# Patient Record
Sex: FEMALE | Race: WHITE | NOT HISPANIC OR LATINO | Employment: OTHER | URBAN - METROPOLITAN AREA
[De-identification: names, ages, dates, MRNs, and addresses within clinical notes are randomized per-mention and may not be internally consistent; named-entity substitution may affect disease eponyms.]

---

## 2017-03-20 ENCOUNTER — TELEPHONE (OUTPATIENT)
Dept: ENDOCRINOLOGY | Facility: CLINIC | Age: 57
End: 2017-03-20

## 2017-03-20 NOTE — TELEPHONE ENCOUNTER
----- Message from Julieta Haile sent at 3/20/2017  8:13 AM CDT -----  Contact: Self [phone]  Pt needs to schedule a f/u with bill. pls add orders if needed.

## 2017-03-27 ENCOUNTER — CLINICAL SUPPORT (OUTPATIENT)
Dept: INFECTIOUS DISEASES | Facility: CLINIC | Age: 57
End: 2017-03-27
Payer: COMMERCIAL

## 2017-03-27 DIAGNOSIS — Z71.84 TRAVEL ADVICE ENCOUNTER: ICD-10-CM

## 2017-03-27 DIAGNOSIS — Z23 IMMUNIZATION DUE: ICD-10-CM

## 2017-03-27 PROCEDURE — 90471 IMMUNIZATION ADMIN: CPT | Mod: S$GLB,,, | Performed by: INTERNAL MEDICINE

## 2017-03-27 PROCEDURE — 99999 PR PBB SHADOW E&M-EST. PATIENT-LVL I: CPT | Mod: PBBFAC,,,

## 2017-03-27 PROCEDURE — 90632 HEPA VACCINE ADULT IM: CPT | Mod: S$GLB,,, | Performed by: INTERNAL MEDICINE

## 2017-04-03 ENCOUNTER — OFFICE VISIT (OUTPATIENT)
Dept: ENDOCRINOLOGY | Facility: CLINIC | Age: 57
End: 2017-04-03
Payer: COMMERCIAL

## 2017-04-03 ENCOUNTER — HOSPITAL ENCOUNTER (OUTPATIENT)
Dept: RADIOLOGY | Facility: CLINIC | Age: 57
Discharge: HOME | End: 2017-04-03
Attending: INTERNAL MEDICINE
Payer: COMMERCIAL

## 2017-04-03 ENCOUNTER — TELEPHONE (OUTPATIENT)
Dept: ENDOCRINOLOGY | Facility: CLINIC | Age: 57
End: 2017-04-03

## 2017-04-03 VITALS
HEIGHT: 68 IN | DIASTOLIC BLOOD PRESSURE: 68 MMHG | BODY MASS INDEX: 19.45 KG/M2 | SYSTOLIC BLOOD PRESSURE: 115 MMHG | WEIGHT: 128.31 LBS | HEART RATE: 63 BPM | RESPIRATION RATE: 16 BRPM

## 2017-04-03 DIAGNOSIS — E03.8 HYPOTHYROIDISM DUE TO HASHIMOTO'S THYROIDITIS: Primary | ICD-10-CM

## 2017-04-03 DIAGNOSIS — E06.3 HASHIMOTO'S THYROIDITIS: ICD-10-CM

## 2017-04-03 DIAGNOSIS — E06.3 HYPOTHYROIDISM DUE TO HASHIMOTO'S THYROIDITIS: Primary | ICD-10-CM

## 2017-04-03 DIAGNOSIS — M81.0 OSTEOPOROSIS: ICD-10-CM

## 2017-04-03 DIAGNOSIS — M81.0 OSTEOPOROSIS, UNSPECIFIED OSTEOPOROSIS TYPE, UNSPECIFIED PATHOLOGICAL FRACTURE PRESENCE: ICD-10-CM

## 2017-04-03 PROCEDURE — 99213 OFFICE O/P EST LOW 20 MIN: CPT | Mod: S$GLB,,, | Performed by: INTERNAL MEDICINE

## 2017-04-03 PROCEDURE — 1160F RVW MEDS BY RX/DR IN RCRD: CPT | Mod: S$GLB,,, | Performed by: INTERNAL MEDICINE

## 2017-04-03 PROCEDURE — 77080 DXA BONE DENSITY AXIAL: CPT | Mod: TC

## 2017-04-03 PROCEDURE — 99999 PR PBB SHADOW E&M-EST. PATIENT-LVL III: CPT | Mod: PBBFAC,,, | Performed by: INTERNAL MEDICINE

## 2017-04-03 PROCEDURE — 77080 DXA BONE DENSITY AXIAL: CPT | Mod: 26,,, | Performed by: INTERNAL MEDICINE

## 2017-04-03 NOTE — PROGRESS NOTES
Subjective:      Patient ID: uNris Cm is a 56 y.o. female.    Chief Complaint:  Hashimoto's Thyroiditis      History of Present Illness  Ms. Cm presents for follow up of Hashimoto's thyroid disease and osteoporosis.   Last seen by Dr. Hicks in 3/2015 and in 11/2016 by Dr. Devine     This is the patient's initial visit with me today     Has active history of autoimmune hepatitis treated with Imuran, breast CA (BRCA 1 positive) in remission and likely Celiacs disease on gluten free diet.      Thyroid levels last checked in 11/2016 and TSH found to be suppressed. [place] Thyroid dose was decreased to 75 mg daily ( 15 mg +60 mg daily)     Repeat TSH drawn 12/23/2016 - now within normal limits     She endorses good compliance with taking [place] thyroid on a daily basis. She denies missing or skipping doses     She also reports fatigue, cold intolerance, and hair loss   She denies occasional tremors or palpitations       Has osteoporosis. Last bone density confirmed osteoporosis. Was previously on monthly Boniva. Was taking Boniva with other meds and after meals. Was told to stop Boniva as it wasn't working. Tolerated the Boniva without issue. On calcium and vit D.   Changed to Fosamax once weekly in 11/2016 at last office visit   Tolerating well without known complications   Takes appropriately with water and sits upright for at least an hour after taking Fosamax     She denies recent falls or fractures   Had repeat BMD today - results pending     Last BMD dated 4/13/2015:  Lumbar Spine: Lumbar bone mineral density L1-L4 is 0.904g/cm2, which is a t-score of -1.3. The z-score is -0.1.    Total Hip: The total hip bone mineral density is 0.680g/cm2.  The t-score is -2.1, and the z-score is -1.4.  Femoral neck BMD is 0.553g/cm2 and the t-score is -2.7.    COMPARISONS: No Previous studies available.   Impression     Osteoporosis of the femoral neck.        Review of Systems   Constitutional: Positive for fatigue.  Negative for chills, fever and unexpected weight change.   Respiratory: Negative for cough and shortness of breath.    Gastrointestinal: Positive for abdominal pain (occasional, depending on diet ). Negative for constipation, nausea and vomiting.   Endocrine: Positive for cold intolerance. Negative for heat intolerance, polydipsia, polyphagia and polyuria.   Musculoskeletal: Negative for neck pain.   Neurological: Negative for dizziness and headaches.   Psychiatric/Behavioral: Negative for confusion. The patient is not nervous/anxious.    As above    Objective:   Physical Exam   Constitutional: She is oriented to person, place, and time. She appears well-developed. No distress.   Neck: No thyromegaly present.   Cardiovascular: Normal rate and regular rhythm.    Pulmonary/Chest: Effort normal.   Abdominal: Soft.   Musculoskeletal: She exhibits no edema.   Neurological: She is alert and oriented to person, place, and time.   Skin: Skin is warm.   Psychiatric: She has a normal mood and affect. Her behavior is normal.   Nursing note and vitals reviewed.    Lab Review:   Results for LEDY GAN (MRN [id number]) as of 4/3/2017 12:49   Ref. Range 8/9/2016 09:15 11/10/2016 09:55 12/23/2016 11:33   TSH Latest Ref Range: 0.400 - 4.000 uIU/mL 7.538 (H) 0.209 (L) 1.425   Free T4 Latest Ref Range: 0.71 - 1.51 ng/dL 0.71 1.05      Results for LEDY GAN (MRN [id number]) as of 4/3/2017 12:49   Ref. Range 5/3/2016 10:46   Vit D, 25-Hydroxy Latest Ref Range: 30 - 96 ng/mL 52     Assessment:     1. Hypothyroidism due to Hashimoto's thyroiditis    2. Osteoporosis, unspecified osteoporosis type, unspecified pathological fracture presence        Plan:     1.) Patient with Hashimoto's thyroiditis  -- clinically and biochemically euthyroid   -- continue [place] thyroid  75 mg daily  -- avoid exogenous hyperthyroidism as this can accelerate bone loss and increase risk of CV complications     2.) Patient with osteoporosis  -- continue  Alendronate 70 mg once weekly   -- reviewed how to take bisphosphonates 30 min before food or other meds will full glass of water and remain upright for at least 30 min afterwards  -- emphasized the importance of calcium and vitamin D, calcium from food sources are preferred   -- continue weight bearing exercises as tolerated   -- fall safety and fall precautions   -- will notify patient with results BMD     RTC in 6 months for follow up with labs prior

## 2017-04-03 NOTE — MR AVS SNAPSHOT
[facility]  [address]  [facility] [address]  Phone: [phone]  Fax: [phone]                  Nuris Cm   4/3/2017 10:30 AM   Office Visit    Description:  Female : 1960   Provider:  Melissa Chavez NP   Department:  [facility] - Endo/Diab/Metab           Reason for Visit     Hashimoto's Thyroiditis           Diagnoses this Visit        Comments    Hypothyroidism due to Hashimoto's thyroiditis    -  Primary     Osteoporosis, unspecified osteoporosis type, unspecified pathological fracture presence                To Do List           Goals (5 Years of Data)     None      Follow-Up and Disposition     Return in about 6 months (around 10/3/2017).      [facility] On [facility]     [facility] On Call Nurse Care Line -  Assistance  Unless otherwise directed by your provider, please contact [facility] On-Call, our nurse care line that is available for  assistance.     Registered nurses in the [facility] On Call Center provide: appointment scheduling, clinical advisement, health education, and other advisory services.  Call: [phone] (toll free)               Medications           Message regarding Medications     Verify the changes and/or additions to your medication regime listed below are the same as discussed with your clinician today.  If any of these changes or additions are incorrect, please notify your healthcare provider.             Verify that the below list of medications is an accurate representation of the medications you are currently taking.  If none reported, the list may be blank. If incorrect, please contact your healthcare provider. Carry this list with you in case of emergency.           Current Medications     alendronate (FOSAMAX) 70 MG tablet Take 1 tablet (70 mg total) by mouth every 7 days.    azathioprine (IMURAN) 50 mg Tab Take 25 mg by mouth once daily.    azithromycin (ZITHROMAX) 500 MG tablet Take 2 tablets once if needed for severe  "diarrhea.    OSPHENA 60 mg Tab     RESTASIS 0.05 % ophthalmic emulsion Inject 1 drop into the eye once daily.    sumatriptan (IMITREX) 25 MG Tab Take 50 mg by mouth as needed.    thyroid, pork, (ARMOUR THYROID) 15 mg Tab Take 1 tablet by mouth once daily.    thyroid, pork, (ARMOUR THYROID) 60 mg Tab Take 1 tablet (60 mg total) by mouth once daily.    tizanidine (ZANAFLEX) 4 MG tablet Take 4 mg by mouth nightly.    tramadol-acetaminophen 37.5-325 mg (ULTRACET) 37.5-325 mg Tab Take 1 tablet by mouth as needed.    trazodone (DESYREL) 50 MG tablet Take 1 tablet by mouth every evening.    valacyclovir (VALTREX) 500 MG tablet Take 1 tablet (500 mg total) by mouth once daily.           Clinical Reference Information           Your Vitals Were     BP Pulse Resp Height Weight BMI    115/68 (BP Location: Left arm, Patient Position: Sitting, BP Method: Manual) 63 16 5' 8" (1.727 m) 58.2 kg (128 lb 4.9 oz) 19.51 kg/m2      Blood Pressure          Most Recent Value    BP  115/68      Allergies as of 4/3/2017     Codeine    Gluten      Immunizations Administered on Date of Encounter - 4/3/2017     None      Orders Placed During Today's Visit     Future Labs/Procedures Expected by Expires    Comprehensive metabolic panel  4/3/2017 (Approximate) 3/29/2018    TSH  4/3/2017 (Approximate) 3/29/2018    Vitamin D  4/3/2017 6/2/2018      Language Assistance Services     ATTENTION: Language assistance services are available, free of charge. Please call [phone].      ATENCIÓN: Si habla español, tiene a garnica disposición servicios gratuitos de asistencia lingüística. Llame al [phone].     CHÚ Ý: N?u b?n nói Ti?ng Vi?t, có các d?ch v? h? tr? ngôn ng? mi?n phí dành cho b?n. G?i s? [phone].         [facility] complies with applicable Federal civil rights laws and does not discriminate on the basis of race, color, national origin, age, disability, or sex.        "

## 2017-04-03 NOTE — TELEPHONE ENCOUNTER
----- Message from Jen Garcia sent at 4/3/2017  8:45 AM CDT -----  Contact: starr      [phone]   Pt.is asking for a f/u and to speak to the nurse about trying to get in to see Dr. Devine for a f/u.

## 2017-04-05 ENCOUNTER — PATIENT MESSAGE (OUTPATIENT)
Dept: ENDOCRINOLOGY | Facility: CLINIC | Age: 57
End: 2017-04-05

## 2017-04-18 RX ORDER — THYROID 15 MG/1
15 TABLET ORAL DAILY
Qty: 90 TABLET | Refills: 3 | Status: SHIPPED | OUTPATIENT
Start: 2017-04-18 | End: 2018-06-04 | Stop reason: SDUPTHER

## 2017-04-18 RX ORDER — THYROID 60 MG/1
60 TABLET ORAL DAILY
Qty: 90 TABLET | Refills: 3 | Status: SHIPPED | OUTPATIENT
Start: 2017-04-18 | End: 2018-06-04 | Stop reason: SDUPTHER

## 2017-11-22 ENCOUNTER — TELEPHONE (OUTPATIENT)
Dept: PULMONOLOGY | Facility: CLINIC | Age: 57
End: 2017-11-22

## 2017-11-22 DIAGNOSIS — K75.4 HEPATITIS, AUTOIMMUNE: Primary | ICD-10-CM

## 2017-11-22 NOTE — TELEPHONE ENCOUNTER
----- Message from Inez Gilligan sent at 11/22/2017 10:13 AM CST -----  Contact: [phone]  Pt call wantiing to get labs for Blood work and Lliver Scan Friday 11/24.

## 2017-11-24 ENCOUNTER — HOSPITAL ENCOUNTER (OUTPATIENT)
Dept: RADIOLOGY | Facility: HOSPITAL | Age: 57
Discharge: HOME | End: 2017-11-24
Attending: INTERNAL MEDICINE
Payer: COMMERCIAL

## 2017-11-24 DIAGNOSIS — K75.4 HEPATITIS, AUTOIMMUNE: ICD-10-CM

## 2017-11-24 PROCEDURE — 76705 ECHO EXAM OF ABDOMEN: CPT | Mod: TC

## 2017-11-24 PROCEDURE — 76705 ECHO EXAM OF ABDOMEN: CPT | Mod: 26,,, | Performed by: RADIOLOGY

## 2017-11-29 DIAGNOSIS — K80.10 CALCULUS OF GALLBLADDER WITH CHRONIC CHOLECYSTITIS WITHOUT OBSTRUCTION: Primary | ICD-10-CM

## 2017-11-30 ENCOUNTER — TELEPHONE (OUTPATIENT)
Dept: PULMONOLOGY | Facility: CLINIC | Age: 57
End: 2017-11-30

## 2017-11-30 NOTE — TELEPHONE ENCOUNTER
I mistakenly booked an appointment with Dr Deleon in Surgery clinic for Mrs Nuris Cm for 12-7-17. Dr Kiser called this to my attention and I cancelled it and called Mrs Cm to apologize  for the error and confusion that it caused. Mrs Cm does not have gallstones, the appoinment is needed for a different patient. Zoila Kim LPN.

## 2017-11-30 NOTE — TELEPHONE ENCOUNTER
----- Message from Jen Garcia sent at 11/30/2017 10:50 AM CST -----  Contact: Nuris  tel: [phone]  Returning a call to Zoila.   Has some questions.   Pls call.

## 2017-12-08 ENCOUNTER — OFFICE VISIT (OUTPATIENT)
Dept: ENDOCRINOLOGY | Facility: CLINIC | Age: 57
End: 2017-12-08
Payer: COMMERCIAL

## 2017-12-08 VITALS
DIASTOLIC BLOOD PRESSURE: 68 MMHG | BODY MASS INDEX: 19.32 KG/M2 | HEIGHT: 68 IN | SYSTOLIC BLOOD PRESSURE: 100 MMHG | HEART RATE: 86 BPM | WEIGHT: 127.44 LBS

## 2017-12-08 DIAGNOSIS — E03.8 HYPOTHYROIDISM DUE TO HASHIMOTO'S THYROIDITIS: Primary | ICD-10-CM

## 2017-12-08 DIAGNOSIS — K75.4 AUTOIMMUNE HEPATITIS: ICD-10-CM

## 2017-12-08 DIAGNOSIS — M81.0 OSTEOPOROSIS, UNSPECIFIED OSTEOPOROSIS TYPE, UNSPECIFIED PATHOLOGICAL FRACTURE PRESENCE: ICD-10-CM

## 2017-12-08 DIAGNOSIS — E06.3 HYPOTHYROIDISM DUE TO HASHIMOTO'S THYROIDITIS: Primary | ICD-10-CM

## 2017-12-08 PROCEDURE — 99214 OFFICE O/P EST MOD 30 MIN: CPT | Mod: S$GLB,,, | Performed by: INTERNAL MEDICINE

## 2017-12-08 PROCEDURE — 99999 PR PBB SHADOW E&M-EST. PATIENT-LVL III: CPT | Mod: PBBFAC,,, | Performed by: INTERNAL MEDICINE

## 2017-12-08 RX ORDER — ALENDRONATE SODIUM 70 MG/1
70 TABLET ORAL
Qty: 12 TABLET | Refills: 3 | Status: SHIPPED | OUTPATIENT
Start: 2017-12-08 | End: 2017-12-14

## 2017-12-08 RX ORDER — VENLAFAXINE 75 MG/1
75 TABLET ORAL DAILY
COMMUNITY

## 2017-12-08 NOTE — PROGRESS NOTES
Patient ID: Nuris Cm is a 57 y.o. female.    Chief Complaint:  Hypothyroidism and Osteoporosis      History of Present Illness  Ms. Cm presents for follow up of Hashimoto's thyroid disease and osteoporosis.   Last seen by me in 11/2016.     Has active history of autoimmune hepatitis treated with Imuran, breast CA (BRCA 1 positive) in remission and likely Celiacs disease on gluten free diet.      Thyroid levels last checked in 12/2016 and TSH found to be elevated. [place] thyroid dose at 75 mg daily. Last TSH WNL in 12/2016.    Has overt fatigue, cold intolerance. No heart palpitations. Has occ tremors. BM's have been regular.      Has osteoporosis. Last bone density confirmed osteoporosis. Has never had a fragility fracture.      Bone Density 4/2017:  BONE MINERAL DENSITY RESULTS:  Lumbar Spine: Lumbar bone mineral density L1-L4 is 0.906g/cm2, which is a t-score of -1.3. The z-score is 0.0.    Total Hip: The total hip bone mineral density is 0.768g/cm2.  The t-score is -1.4, and the z-score is -0.6.  Femoral neck BMD is 0.538g/cm2 and the t-score is -2.8.    COMPARISONS:  Date Location BMD T-score  04/13/15 L-spine 0.904 -1.3  Total Hip 0.680 -2.1   Impression       Osteoporosis of the femoral neck. There is a significant increase in BMD a the total hip (12.9%) when compared to previous study done in 2015. No change at the lumbar spine.        Review of Systems   Constitutional: Negative for chills and fever.   Gastrointestinal: Negative for nausea.   No CP  No SOB    Objective:   Physical Exam   Nursing note and vitals reviewed.  No thyromegaly   DTR's 2 +  No tremor  Lungs CTA b/l  Heart RRR    Lab Review:   Results for NURIS CM (MRN [id number]) as of 12/8/2017 07:26   Ref. Range 12/23/2016 11:33 11/24/2017 08:02   Sodium Latest Ref Range: 136 - 145 mmol/L  140   Potassium Latest Ref Range: 3.5 - 5.1 mmol/L  3.4 (L)   Chloride Latest Ref Range: 95 - 110 mmol/L  103   CO2 Latest Ref Range: 23 - 29 mmol/L   30 (H)   Anion Gap Latest Ref Range: 8 - 16 mmol/L  7 (L)   BUN, Bld Latest Ref Range: 6 - 20 mg/dL  12   Creatinine Latest Ref Range: 0.5 - 1.4 mg/dL  0.7   eGFR if non African American Latest Ref Range: >60 mL/min/1.73 m^2  >60.0   eGFR if African American Latest Ref Range: >60 mL/min/1.73 m^2  >60.0   Glucose Latest Ref Range: 70 - 110 mg/dL  79   Calcium Latest Ref Range: 8.7 - 10.5 mg/dL  9.4   Alkaline Phosphatase Latest Ref Range: 55 - 135 U/L  58   Total Protein Latest Ref Range: 6.0 - 8.4 g/dL  6.7   Albumin Latest Ref Range: 3.5 - 5.2 g/dL  3.7   Total Bilirubin Latest Ref Range: 0.1 - 1.0 mg/dL  0.8   AST Latest Ref Range: 10 - 40 U/L  21   ALT Latest Ref Range: 10 - 44 U/L  9 (L)   GGT Latest Ref Range: 8 - 55 U/L  9   Triglycerides Latest Ref Range: 30 - 150 mg/dL  62   Cholesterol Latest Ref Range: 120 - 199 mg/dL  189   HDL Latest Ref Range: 40 - 75 mg/dL  58   LDL Cholesterol Latest Ref Range: 63.0 - 159.0 mg/dL  118.6   Total Cholesterol/HDL Ratio Latest Ref Range: 2.0 - 5.0   3.3   TSH Latest Ref Range: 0.400 - 4.000 uIU/mL 1.425        Assessment:     1. Hypothyroidism due to Hashimoto's thyroiditis    2. Osteoporosis, unspecified osteoporosis type, unspecified pathological fracture presence    3. Autoimmune hepatitis      Plan:     1.) Patient with Hashimoto's thyroiditis  --Last TSH WNL  --Will repeat TSH today  --Will continue [place] thyroid to 75 mg daily pending TSH result     2.) Patient with osteoporosis  --Bone density in 4/2017 showed improvement at hip and stability at the spine  --Will continue alendronate 70 mg weekly   --Calcium and Vit D supplementation  --Weight bearing exercise  --Repeat bone density in 4/2019    3.) On Imuran     RTC in 6 months with labs prior to appt     Artemio Devine M.D. Staff Endocrinology

## 2017-12-10 ENCOUNTER — PATIENT MESSAGE (OUTPATIENT)
Dept: ENDOCRINOLOGY | Facility: CLINIC | Age: 57
End: 2017-12-10

## 2017-12-14 RX ORDER — ALENDRONATE SODIUM 70 MG/1
TABLET ORAL
Qty: 12 TABLET | Refills: 3 | Status: SHIPPED | OUTPATIENT
Start: 2017-12-14 | End: 2018-09-20 | Stop reason: SDUPTHER

## 2018-02-02 DIAGNOSIS — Z00.00 ROUTINE GENERAL MEDICAL EXAMINATION AT A HEALTH CARE FACILITY: Primary | ICD-10-CM

## 2018-02-06 ENCOUNTER — OFFICE VISIT (OUTPATIENT)
Dept: PULMONOLOGY | Facility: CLINIC | Age: 58
End: 2018-02-06
Payer: COMMERCIAL

## 2018-02-06 ENCOUNTER — HOSPITAL ENCOUNTER (OUTPATIENT)
Dept: CARDIOLOGY | Facility: CLINIC | Age: 58
Discharge: HOME | End: 2018-02-06
Payer: COMMERCIAL

## 2018-02-06 ENCOUNTER — CLINICAL SUPPORT (OUTPATIENT)
Dept: INTERNAL MEDICINE | Facility: CLINIC | Age: 58
End: 2018-02-06
Payer: COMMERCIAL

## 2018-02-06 VITALS
HEIGHT: 68 IN | HEART RATE: 75 BPM | SYSTOLIC BLOOD PRESSURE: 109 MMHG | WEIGHT: 123 LBS | BODY MASS INDEX: 18.64 KG/M2 | DIASTOLIC BLOOD PRESSURE: 70 MMHG

## 2018-02-06 DIAGNOSIS — Z00.00 ROUTINE GENERAL MEDICAL EXAMINATION AT A HEALTH CARE FACILITY: Primary | ICD-10-CM

## 2018-02-06 DIAGNOSIS — Z00.00 ANNUAL PHYSICAL EXAM: Primary | ICD-10-CM

## 2018-02-06 DIAGNOSIS — Z00.00 ROUTINE GENERAL MEDICAL EXAMINATION AT A HEALTH CARE FACILITY: ICD-10-CM

## 2018-02-06 LAB
ALBUMIN SERPL BCP-MCNC: 3.9 G/DL
ALP SERPL-CCNC: 70 U/L
ALT SERPL W/O P-5'-P-CCNC: 11 U/L
ANION GAP SERPL CALC-SCNC: 8 MMOL/L
AST SERPL-CCNC: 22 U/L
BILIRUB SERPL-MCNC: 1 MG/DL
BUN SERPL-MCNC: 11 MG/DL
CALCIUM SERPL-MCNC: 9.9 MG/DL
CHLORIDE SERPL-SCNC: 107 MMOL/L
CHOLEST SERPL-MCNC: 194 MG/DL
CHOLEST/HDLC SERPL: 3 {RATIO}
CO2 SERPL-SCNC: 30 MMOL/L
CREAT SERPL-MCNC: 0.8 MG/DL
ERYTHROCYTE [DISTWIDTH] IN BLOOD BY AUTOMATED COUNT: 11.9 %
EST. GFR  (AFRICAN AMERICAN): >60 ML/MIN/1.73 M^2
EST. GFR  (NON AFRICAN AMERICAN): >60 ML/MIN/1.73 M^2
ESTIMATED AVG GLUCOSE: 97 MG/DL
GLUCOSE SERPL-MCNC: 83 MG/DL
HBA1C MFR BLD HPLC: 5 %
HCT VFR BLD AUTO: 38 %
HDLC SERPL-MCNC: 64 MG/DL
HDLC SERPL: 33 %
HGB BLD-MCNC: 13 G/DL
LDLC SERPL CALC-MCNC: 118.8 MG/DL
MCH RBC QN AUTO: 34.2 PG
MCHC RBC AUTO-ENTMCNC: 34.2 G/DL
MCV RBC AUTO: 100 FL
NONHDLC SERPL-MCNC: 130 MG/DL
PLATELET # BLD AUTO: 168 K/UL
PMV BLD AUTO: 9.7 FL
POTASSIUM SERPL-SCNC: 3.7 MMOL/L
PROT SERPL-MCNC: 7.1 G/DL
RBC # BLD AUTO: 3.8 M/UL
SODIUM SERPL-SCNC: 145 MMOL/L
T4 FREE SERPL-MCNC: 0.84 NG/DL
TRIGL SERPL-MCNC: 56 MG/DL
TSH SERPL DL<=0.005 MIU/L-ACNC: 0.11 UIU/ML
WBC # BLD AUTO: 2.77 K/UL

## 2018-02-06 PROCEDURE — 85027 COMPLETE CBC AUTOMATED: CPT

## 2018-02-06 PROCEDURE — 84439 ASSAY OF FREE THYROXINE: CPT

## 2018-02-06 PROCEDURE — 83036 HEMOGLOBIN GLYCOSYLATED A1C: CPT

## 2018-02-06 PROCEDURE — 80053 COMPREHEN METABOLIC PANEL: CPT

## 2018-02-06 PROCEDURE — 84443 ASSAY THYROID STIM HORMONE: CPT

## 2018-02-06 PROCEDURE — 93000 ELECTROCARDIOGRAM COMPLETE: CPT | Mod: S$GLB,,, | Performed by: INTERNAL MEDICINE

## 2018-02-06 PROCEDURE — 99396 PREV VISIT EST AGE 40-64: CPT | Mod: S$GLB,,, | Performed by: INTERNAL MEDICINE

## 2018-02-06 PROCEDURE — 99999 PR PBB SHADOW E&M-EST. PATIENT-LVL III: CPT | Mod: PBBFAC,,, | Performed by: INTERNAL MEDICINE

## 2018-02-06 PROCEDURE — 80061 LIPID PANEL: CPT

## 2018-02-06 RX ORDER — MONTELUKAST SODIUM 10 MG/1
1 TABLET ORAL DAILY PRN
Refills: 0 | COMMUNITY
Start: 2018-02-01 | End: 2019-02-13

## 2018-02-06 RX ORDER — MELOXICAM 15 MG/1
1 TABLET ORAL DAILY
Refills: 3 | COMMUNITY
Start: 2018-02-01

## 2018-02-06 RX ORDER — DIPHENOXYLATE HYDROCHLORIDE AND ATROPINE SULFATE 2.5; .025 MG/1; MG/1
1 TABLET ORAL 3 TIMES DAILY PRN
Refills: 0 | COMMUNITY
Start: 2017-12-29 | End: 2019-02-13 | Stop reason: SDUPTHER

## 2018-02-06 NOTE — PROGRESS NOTES
Subjective:       Patient ID: Nuris Cm is a 57 y.o. female.    Chief Complaint: Annual Exam    HPI  58 yo female comes for her periodic health exam. She feels well, recently had her annual check with Dr. Billy in [place] for her auto immune hepatitis, is on 25 mg of Imuran for maintenance. 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 She has done very well, her oncologist at [facility] has dismissed her, I will see if I can get Dr. Carrillo to follow her.  Review of Systems   Constitutional: Negative.    HENT: Negative.    Eyes: Negative.    Respiratory: Negative.    Cardiovascular: Negative.    Gastrointestinal: Negative.         Long term maintenance with Imuran 25 mg for auto immune heptatitis   Endocrine:        Thyroid replacement following Hashimoto' sThyroiditis    Fosamax for osteoporosis   Genitourinary: Negative.    Musculoskeletal: Negative.    Skin: Negative.    Neurological: Negative.    Psychiatric/Behavioral: Negative.    All other systems reviewed and are negative.      Objective:      Physical Exam   Constitutional: She is oriented to person, place, and time. She appears well-developed and well-nourished. No distress.   HENT:   Head: Normocephalic and atraumatic.   Right Ear: External ear normal.   Left Ear: External ear normal.   Nose: Nose normal.   Mouth/Throat: Oropharynx is clear and moist.   Eyes: Conjunctivae and EOM are normal. Pupils are equal, round, and reactive to light.   Neck: Normal range of motion. Neck supple. No JVD present. No thyromegaly present.   Cardiovascular: Normal rate, regular rhythm, normal heart sounds and intact distal pulses.  Exam reveals no gallop.    No murmur heard.  Pulmonary/Chest: Breath sounds normal. No stridor. No respiratory distress. She has no wheezes. She has no rales. She exhibits no tenderness.   Peak flow 440 l/min   Abdominal: Soft. Bowel sounds are  normal. She exhibits no distension and no mass. There is no tenderness. There is no rebound and no guarding.   Musculoskeletal: Normal range of motion. She exhibits no edema.   Lymphadenopathy:     She has no cervical adenopathy.   Neurological: She is alert and oriented to person, place, and time. She has normal reflexes. She displays normal reflexes. No cranial nerve deficit.   Skin: Skin is warm and dry. No rash noted.   Psychiatric: She has a normal mood and affect. Her behavior is normal. Judgment and thought content normal.   Nursing note and vitals reviewed.      Assessment:       No diagnosis found.    Plan:        Labs: Low TSH compatible with thyroid replacement with normal T-4, all other parameters are normal.EKG is normal. IMP: Healthy Female

## 2018-02-06 NOTE — LETTER
February 6, 2018    Nuris Cm  [address] [facility] [place] [address]             [facility]  [address]  [facility] [address]  Phone: [phone] Dear Nuris,       Thank you for allowing me to serve you and perform your [facility] exam on 2/6/2018. This letter will serve as a brief summary of the physical findings and laboratory/studies performed and recommendations at this time. Tell [place]  Congratulations on his upcoming nuptials.          If you have any questions or concerns, please don't hesitate to call.    Sincerely,        Perry Kiser MD

## 2018-06-04 RX ORDER — SULFAMETHOXAZOLE AND TRIMETHOPRIM 800; 160 MG/1; MG/1
TABLET ORAL
Qty: 90 TABLET | Refills: 1 | Status: SHIPPED | OUTPATIENT
Start: 2018-06-04 | End: 2019-01-18 | Stop reason: SDUPTHER

## 2018-06-04 RX ORDER — THYROID, PORCINE 15 MG/1
TABLET ORAL
Qty: 90 TABLET | Refills: 1 | Status: SHIPPED | OUTPATIENT
Start: 2018-06-04 | End: 2018-11-24 | Stop reason: SDUPTHER

## 2018-06-27 DIAGNOSIS — B00.9 HERPES: ICD-10-CM

## 2018-06-27 RX ORDER — VALACYCLOVIR HYDROCHLORIDE 500 MG/1
TABLET, FILM COATED ORAL
Qty: 90 TABLET | Refills: 0 | Status: SHIPPED | OUTPATIENT
Start: 2018-06-27 | End: 2018-10-09 | Stop reason: SDUPTHER

## 2018-08-13 ENCOUNTER — PATIENT MESSAGE (OUTPATIENT)
Dept: ENDOCRINOLOGY | Facility: CLINIC | Age: 58
End: 2018-08-13

## 2018-09-20 ENCOUNTER — OFFICE VISIT (OUTPATIENT)
Dept: ENDOCRINOLOGY | Facility: CLINIC | Age: 58
End: 2018-09-20
Payer: COMMERCIAL

## 2018-09-20 VITALS
RESPIRATION RATE: 18 BRPM | WEIGHT: 125.88 LBS | HEIGHT: 68 IN | SYSTOLIC BLOOD PRESSURE: 112 MMHG | HEART RATE: 76 BPM | DIASTOLIC BLOOD PRESSURE: 76 MMHG | BODY MASS INDEX: 19.08 KG/M2

## 2018-09-20 DIAGNOSIS — E03.8 HYPOTHYROIDISM DUE TO HASHIMOTO'S THYROIDITIS: Primary | ICD-10-CM

## 2018-09-20 DIAGNOSIS — M81.0 OSTEOPOROSIS, UNSPECIFIED OSTEOPOROSIS TYPE, UNSPECIFIED PATHOLOGICAL FRACTURE PRESENCE: ICD-10-CM

## 2018-09-20 DIAGNOSIS — E06.3 HYPOTHYROIDISM DUE TO HASHIMOTO'S THYROIDITIS: Primary | ICD-10-CM

## 2018-09-20 DIAGNOSIS — K75.4 AUTOIMMUNE HEPATITIS: ICD-10-CM

## 2018-09-20 PROCEDURE — 99213 OFFICE O/P EST LOW 20 MIN: CPT | Mod: S$GLB,,, | Performed by: INTERNAL MEDICINE

## 2018-09-20 PROCEDURE — 3008F BODY MASS INDEX DOCD: CPT | Mod: CPTII,S$GLB,, | Performed by: INTERNAL MEDICINE

## 2018-09-20 PROCEDURE — 99999 PR PBB SHADOW E&M-EST. PATIENT-LVL III: CPT | Mod: PBBFAC,,, | Performed by: INTERNAL MEDICINE

## 2018-09-20 RX ORDER — ALENDRONATE SODIUM 70 MG/1
70 TABLET ORAL
Qty: 12 TABLET | Refills: 3 | Status: SHIPPED | OUTPATIENT
Start: 2018-09-20 | End: 2019-05-28 | Stop reason: SDUPTHER

## 2018-09-20 NOTE — PROGRESS NOTES
Subjective:      Patient ID: Nuris Cm is a 57 y.o. female.    Chief Complaint:  Hashimoto's Thyroiditis      History of Present Illness  Ms. Cm presents for follow up of Hashimoto's thyroid disease and osteoporosis.   Last seen by me in 12/2017.     Has active history of autoimmune hepatitis treated with Imuran, breast CA (BRCA 1 positive) in remission and likely Celiacs disease on gluten free diet.      Last TSH level suppressed. [place] thyroid dose at 75 mg daily. Last TSH WNL in 12/2016.    No overt fatigue. Has chronic constipation. Has some cold intolerance. Has been losing more hair recently. Has very dry skin.   No heart palpitations, tremors or increased anxiousness.      Has osteoporosis. Last bone density confirmed osteoporosis. Has never had a fragility fracture.      Bone Density 4/2017:  BONE MINERAL DENSITY RESULTS:  Lumbar Spine: Lumbar bone mineral density L1-L4 is 0.906g/cm2, which is a t-score of -1.3. The z-score is 0.0.    Total Hip: The total hip bone mineral density is 0.768g/cm2.  The t-score is -1.4, and the z-score is -0.6.  Femoral neck BMD is 0.538g/cm2 and the t-score is -2.8.    COMPARISONS:  Date Location BMD T-score  04/13/15 L-spine 0.904 -1.3  Total Hip 0.680 -2.1   Impression        Osteoporosis of the femoral neck. There is a significant increase in BMD a the total hip (12.9%) when compared to previous study done in 2015. No change at the lumbar spine.      Tolerating Fosamax with no issues.     Review of Systems   Constitutional: Negative for chills and fever.   Gastrointestinal: Negative for nausea.   No CP  No SOB    Objective:   Physical Exam   Nursing note and vitals reviewed.  No thyromegaly  No tremor  DTR's 2 +  Lungs CTA b/l  No edema    Lab Review:   Results for NURIS CM (MRN [id number]) as of 9/20/2018 15:38   Ref. Range 2/6/2018 08:54   Sodium Latest Ref Range: 136 - 145 mmol/L 145   Potassium Latest Ref Range: 3.5 - 5.1 mmol/L 3.7   Chloride Latest Ref Range:  95 - 110 mmol/L 107   CO2 Latest Ref Range: 23 - 29 mmol/L 30 (H)   Anion Gap Latest Ref Range: 8 - 16 mmol/L 8   BUN, Bld Latest Ref Range: 6 - 20 mg/dL 11   Creatinine Latest Ref Range: 0.5 - 1.4 mg/dL 0.8   eGFR if non African American Latest Ref Range: >60 mL/min/1.73 m^2 >60.0   eGFR if African American Latest Ref Range: >60 mL/min/1.73 m^2 >60.0   Glucose Latest Ref Range: 70 - 110 mg/dL 83   Calcium Latest Ref Range: 8.7 - 10.5 mg/dL 9.9   Alkaline Phosphatase Latest Ref Range: 55 - 135 U/L 70   Total Protein Latest Ref Range: 6.0 - 8.4 g/dL 7.1   Albumin Latest Ref Range: 3.5 - 5.2 g/dL 3.9   Total Bilirubin Latest Ref Range: 0.1 - 1.0 mg/dL 1.0   AST Latest Ref Range: 10 - 40 U/L 22   ALT Latest Ref Range: 10 - 44 U/L 11   Triglycerides Latest Ref Range: 30 - 150 mg/dL 56   Cholesterol Latest Ref Range: 120 - 199 mg/dL 194   HDL Latest Ref Range: 40 - 75 mg/dL 64   LDL Cholesterol Latest Ref Range: 63.0 - 159.0 mg/dL 118.8   Total Cholesterol/HDL Ratio Latest Ref Range: 2.0 - 5.0  3.0   Hemoglobin A1C Latest Ref Range: 4.0 - 5.6 % 5.0   Estimated Avg Glucose Latest Ref Range: 68 - 131 mg/dL 97   TSH Latest Ref Range: 0.400 - 4.000 uIU/mL 0.109 (L)   Free T4 Latest Ref Range: 0.71 - 1.51 ng/dL 0.84       Assessment:     1. Hypothyroidism due to Hashimoto's thyroiditis    2. Osteoporosis, unspecified osteoporosis type, unspecified pathological fracture presence    3. Autoimmune hepatitis      Plan:     1.) Patient with hypothyroidism  --Clinically she is euthyroid to slightly hypothyroid but biochemically hyperthyroid based on last TSH  --Will repeat TFT's now and adjust dose if necessary    2.) Patient with osteoporosis  --No history of fracture  --Last bone density showed stability  --Due for repeat bone density in 4/2019  --Continue Fosamax 70 mg weekly    3.) Currently on Imuran  --Will repeat abdominal ultrasound now      RTC in one year    Artemio Devine M.D. Staff Endocrinology

## 2018-10-02 ENCOUNTER — LAB VISIT (OUTPATIENT)
Dept: LAB | Facility: HOSPITAL | Age: 58
End: 2018-10-02
Attending: INTERNAL MEDICINE
Payer: COMMERCIAL

## 2018-10-02 DIAGNOSIS — M81.0 OSTEOPOROSIS, UNSPECIFIED OSTEOPOROSIS TYPE, UNSPECIFIED PATHOLOGICAL FRACTURE PRESENCE: ICD-10-CM

## 2018-10-02 DIAGNOSIS — E06.3 HYPOTHYROIDISM DUE TO HASHIMOTO'S THYROIDITIS: ICD-10-CM

## 2018-10-02 DIAGNOSIS — E03.8 HYPOTHYROIDISM DUE TO HASHIMOTO'S THYROIDITIS: ICD-10-CM

## 2018-10-02 DIAGNOSIS — K75.4 AUTOIMMUNE HEPATITIS: ICD-10-CM

## 2018-10-02 LAB
25(OH)D3+25(OH)D2 SERPL-MCNC: 37 NG/ML
ALBUMIN SERPL BCP-MCNC: 4 G/DL
ALP SERPL-CCNC: 54 U/L
ALT SERPL W/O P-5'-P-CCNC: 11 U/L
ANION GAP SERPL CALC-SCNC: 8 MMOL/L
AST SERPL-CCNC: 23 U/L
BASOPHILS # BLD AUTO: 0.02 K/UL
BASOPHILS NFR BLD: 0.6 %
BILIRUB SERPL-MCNC: 0.6 MG/DL
BUN SERPL-MCNC: 13 MG/DL
CALCIUM SERPL-MCNC: 9.9 MG/DL
CHLORIDE SERPL-SCNC: 103 MMOL/L
CHOLEST SERPL-MCNC: 177 MG/DL
CHOLEST/HDLC SERPL: 2.9 {RATIO}
CO2 SERPL-SCNC: 25 MMOL/L
CREAT SERPL-MCNC: 0.7 MG/DL
DIFFERENTIAL METHOD: ABNORMAL
EOSINOPHIL # BLD AUTO: 0.1 K/UL
EOSINOPHIL NFR BLD: 2 %
ERYTHROCYTE [DISTWIDTH] IN BLOOD BY AUTOMATED COUNT: 12.2 %
EST. GFR  (AFRICAN AMERICAN): >60 ML/MIN/1.73 M^2
EST. GFR  (NON AFRICAN AMERICAN): >60 ML/MIN/1.73 M^2
GLUCOSE SERPL-MCNC: 81 MG/DL
HCT VFR BLD AUTO: 39 %
HDLC SERPL-MCNC: 62 MG/DL
HDLC SERPL: 35 %
HGB BLD-MCNC: 13 G/DL
IMM GRANULOCYTES # BLD AUTO: 0.01 K/UL
IMM GRANULOCYTES NFR BLD AUTO: 0.3 %
LDLC SERPL CALC-MCNC: 104.2 MG/DL
LYMPHOCYTES # BLD AUTO: 0.6 K/UL
LYMPHOCYTES NFR BLD: 15.9 %
MCH RBC QN AUTO: 35 PG
MCHC RBC AUTO-ENTMCNC: 33.3 G/DL
MCV RBC AUTO: 105 FL
MONOCYTES # BLD AUTO: 0.4 K/UL
MONOCYTES NFR BLD: 11.9 %
NEUTROPHILS # BLD AUTO: 2.4 K/UL
NEUTROPHILS NFR BLD: 69.3 %
NONHDLC SERPL-MCNC: 115 MG/DL
NRBC BLD-RTO: 0 /100 WBC
PLATELET # BLD AUTO: 160 K/UL
PMV BLD AUTO: 10.4 FL
POTASSIUM SERPL-SCNC: 4 MMOL/L
PROT SERPL-MCNC: 6.7 G/DL
RBC # BLD AUTO: 3.71 M/UL
SODIUM SERPL-SCNC: 136 MMOL/L
T3 SERPL-MCNC: 93 NG/DL
T4 FREE SERPL-MCNC: 0.86 NG/DL
TRIGL SERPL-MCNC: 54 MG/DL
TSH SERPL DL<=0.005 MIU/L-ACNC: 0.08 UIU/ML
WBC # BLD AUTO: 3.45 K/UL

## 2018-10-02 PROCEDURE — 84439 ASSAY OF FREE THYROXINE: CPT

## 2018-10-02 PROCEDURE — 84443 ASSAY THYROID STIM HORMONE: CPT

## 2018-10-02 PROCEDURE — 82306 VITAMIN D 25 HYDROXY: CPT

## 2018-10-02 PROCEDURE — 84480 ASSAY TRIIODOTHYRONINE (T3): CPT

## 2018-10-02 PROCEDURE — 36415 COLL VENOUS BLD VENIPUNCTURE: CPT | Mod: PO

## 2018-10-02 PROCEDURE — 85025 COMPLETE CBC W/AUTO DIFF WBC: CPT

## 2018-10-02 PROCEDURE — 80053 COMPREHEN METABOLIC PANEL: CPT

## 2018-10-02 PROCEDURE — 80061 LIPID PANEL: CPT

## 2018-10-03 ENCOUNTER — HOSPITAL ENCOUNTER (OUTPATIENT)
Dept: RADIOLOGY | Facility: HOSPITAL | Age: 58
Discharge: HOME | End: 2018-10-03
Attending: INTERNAL MEDICINE
Payer: COMMERCIAL

## 2018-10-03 DIAGNOSIS — K75.4 AUTOIMMUNE HEPATITIS: ICD-10-CM

## 2018-10-03 PROCEDURE — 76705 ECHO EXAM OF ABDOMEN: CPT | Mod: 26,,, | Performed by: RADIOLOGY

## 2018-10-03 PROCEDURE — 76705 ECHO EXAM OF ABDOMEN: CPT | Mod: TC

## 2018-10-06 ENCOUNTER — PATIENT MESSAGE (OUTPATIENT)
Dept: ENDOCRINOLOGY | Facility: CLINIC | Age: 58
End: 2018-10-06

## 2018-10-06 DIAGNOSIS — E03.8 HYPOTHYROIDISM DUE TO HASHIMOTO'S THYROIDITIS: Primary | ICD-10-CM

## 2018-10-06 DIAGNOSIS — E06.3 HYPOTHYROIDISM DUE TO HASHIMOTO'S THYROIDITIS: Primary | ICD-10-CM

## 2018-10-09 DIAGNOSIS — B00.9 HERPES: ICD-10-CM

## 2018-10-10 RX ORDER — VALACYCLOVIR HYDROCHLORIDE 500 MG/1
TABLET, FILM COATED ORAL
Qty: 90 TABLET | Refills: 0 | Status: SHIPPED | OUTPATIENT
Start: 2018-10-10 | End: 2019-02-13 | Stop reason: SDUPTHER

## 2018-11-26 RX ORDER — THYROID, PORCINE 15 MG/1
TABLET ORAL
Qty: 90 TABLET | Refills: 0 | Status: SHIPPED | OUTPATIENT
Start: 2018-11-26 | End: 2019-11-13 | Stop reason: SDUPTHER

## 2018-12-03 ENCOUNTER — LAB VISIT (OUTPATIENT)
Dept: LAB | Facility: HOSPITAL | Age: 58
End: 2018-12-03
Attending: INTERNAL MEDICINE
Payer: COMMERCIAL

## 2018-12-03 DIAGNOSIS — E03.8 HYPOTHYROIDISM DUE TO HASHIMOTO'S THYROIDITIS: ICD-10-CM

## 2018-12-03 DIAGNOSIS — E06.3 HYPOTHYROIDISM DUE TO HASHIMOTO'S THYROIDITIS: ICD-10-CM

## 2018-12-03 LAB
T4 FREE SERPL-MCNC: 0.86 NG/DL
TSH SERPL DL<=0.005 MIU/L-ACNC: 0.28 UIU/ML

## 2018-12-03 PROCEDURE — 84439 ASSAY OF FREE THYROXINE: CPT

## 2018-12-03 PROCEDURE — 84443 ASSAY THYROID STIM HORMONE: CPT

## 2018-12-03 PROCEDURE — 36415 COLL VENOUS BLD VENIPUNCTURE: CPT | Mod: PO

## 2018-12-07 ENCOUNTER — PATIENT MESSAGE (OUTPATIENT)
Dept: ENDOCRINOLOGY | Facility: CLINIC | Age: 58
End: 2018-12-07

## 2018-12-07 DIAGNOSIS — E03.8 HYPOTHYROIDISM DUE TO HASHIMOTO'S THYROIDITIS: Primary | ICD-10-CM

## 2018-12-07 DIAGNOSIS — E06.3 HYPOTHYROIDISM DUE TO HASHIMOTO'S THYROIDITIS: Primary | ICD-10-CM

## 2019-01-18 RX ORDER — SULFAMETHOXAZOLE AND TRIMETHOPRIM 800; 160 MG/1; MG/1
TABLET ORAL
Qty: 90 TABLET | Refills: 1 | Status: SHIPPED | OUTPATIENT
Start: 2019-01-18 | End: 2019-07-23 | Stop reason: SDUPTHER

## 2019-02-13 ENCOUNTER — OFFICE VISIT (OUTPATIENT)
Dept: PULMONOLOGY | Facility: CLINIC | Age: 59
End: 2019-02-13
Payer: COMMERCIAL

## 2019-02-13 ENCOUNTER — HOSPITAL ENCOUNTER (OUTPATIENT)
Dept: RADIOLOGY | Facility: HOSPITAL | Age: 59
Discharge: HOME | End: 2019-02-13
Attending: INTERNAL MEDICINE
Payer: COMMERCIAL

## 2019-02-13 VITALS — WEIGHT: 134.06 LBS | HEIGHT: 68 IN | BODY MASS INDEX: 20.32 KG/M2 | OXYGEN SATURATION: 97 % | HEART RATE: 104 BPM

## 2019-02-13 DIAGNOSIS — R05.9 COUGH: Primary | ICD-10-CM

## 2019-02-13 DIAGNOSIS — R19.7 DIARRHEA, UNSPECIFIED TYPE: ICD-10-CM

## 2019-02-13 DIAGNOSIS — R05.9 COUGH: ICD-10-CM

## 2019-02-13 DIAGNOSIS — B00.9 HERPES: ICD-10-CM

## 2019-02-13 DIAGNOSIS — J45.20 ASTHMA, MILD INTERMITTENT, WELL-CONTROLLED: ICD-10-CM

## 2019-02-13 DIAGNOSIS — Z86.69 HISTORY OF MIGRAINE HEADACHES: ICD-10-CM

## 2019-02-13 PROCEDURE — 71046 X-RAY EXAM CHEST 2 VIEWS: CPT | Mod: TC,FY

## 2019-02-13 PROCEDURE — 3008F BODY MASS INDEX DOCD: CPT | Mod: CPTII,S$GLB,, | Performed by: INTERNAL MEDICINE

## 2019-02-13 PROCEDURE — 99999 PR PBB SHADOW E&M-EST. PATIENT-LVL III: ICD-10-PCS | Mod: PBBFAC,,, | Performed by: INTERNAL MEDICINE

## 2019-02-13 PROCEDURE — 99214 PR OFFICE/OUTPT VISIT, EST, LEVL IV, 30-39 MIN: ICD-10-PCS | Mod: S$GLB,,, | Performed by: INTERNAL MEDICINE

## 2019-02-13 PROCEDURE — 3008F PR BODY MASS INDEX (BMI) DOCUMENTED: ICD-10-PCS | Mod: CPTII,S$GLB,, | Performed by: INTERNAL MEDICINE

## 2019-02-13 PROCEDURE — 99214 OFFICE O/P EST MOD 30 MIN: CPT | Mod: S$GLB,,, | Performed by: INTERNAL MEDICINE

## 2019-02-13 PROCEDURE — 71046 X-RAY EXAM CHEST 2 VIEWS: CPT | Mod: 26,,, | Performed by: RADIOLOGY

## 2019-02-13 PROCEDURE — 71046 XR CHEST PA AND LATERAL: ICD-10-PCS | Mod: 26,,, | Performed by: RADIOLOGY

## 2019-02-13 PROCEDURE — 99999 PR PBB SHADOW E&M-EST. PATIENT-LVL III: CPT | Mod: PBBFAC,,, | Performed by: INTERNAL MEDICINE

## 2019-02-13 RX ORDER — VALACYCLOVIR HYDROCHLORIDE 500 MG/1
500 TABLET, FILM COATED ORAL DAILY
Qty: 90 TABLET | Refills: 3 | Status: SHIPPED | OUTPATIENT
Start: 2019-02-13 | End: 2020-02-18

## 2019-02-13 RX ORDER — DIPHENOXYLATE HYDROCHLORIDE AND ATROPINE SULFATE 2.5; .025 MG/1; MG/1
1 TABLET ORAL 3 TIMES DAILY PRN
Qty: 40 TABLET | Refills: 3 | Status: SHIPPED | OUTPATIENT
Start: 2019-02-13 | End: 2021-03-01 | Stop reason: SDUPTHER

## 2019-02-13 RX ORDER — PREDNISONE 10 MG/1
TABLET ORAL
Qty: 35 TABLET | Refills: 1 | Status: SHIPPED | OUTPATIENT
Start: 2019-02-13 | End: 2019-11-13

## 2019-02-13 RX ORDER — SUMATRIPTAN SUCCINATE 25 MG/1
50 TABLET ORAL
Qty: 30 TABLET | Refills: 6 | Status: SHIPPED | OUTPATIENT
Start: 2019-02-13 | End: 2020-02-29

## 2019-02-13 RX ORDER — AZELASTINE 1 MG/ML
2 SPRAY, METERED NASAL DAILY
COMMUNITY
Start: 2019-02-05

## 2019-02-14 NOTE — PROGRESS NOTES
Subjective:      Patient ID: Nuris Cm is a 58 y.o. female.    Chief Complaint: Cough    HPI  59yo female well known to me, has past hx of breast cancer and reconstructive surgery, auto immune hepatitis, and hashimoto's thyroiditis. Comes for assessment of a cough that has lingered since November. She just returns from a 2 week  Vacation on the [place] in the [facility]. No chills or fever. Has been on astelin and flonase, had a round of antibiotics. No benefit.      No flowsheet data found.  Review of Systems   Constitutional: Negative.    HENT: Positive for postnasal drip and rhinorrhea.    Eyes: Negative.    Respiratory: Positive for cough.    Cardiovascular: Negative.    Genitourinary: Negative.    Endocrine: Thyroid replacement after Hashimoto's thyroditis    On fosamax for osteoporosis   Musculoskeletal: Negative.    Skin: Negative.    Gastrointestinal: Negative.         On low dose imuran for auto immune hepatitis   Neurological: Positive for headaches.        Hx of migraine headaches   Psychiatric/Behavioral: Negative.      Objective:     Physical Exam   Pulmonary/Chest:   Clear without wheezes or rales  Chest x-ray is clear  No evidence of reoccurrence of her breast cancer.  Peak flow 380l/min    Sa02: 97%       Assessment:     1. Cough    2. Herpes    3. History of migraine headaches    4. Asthma, mild intermittent, well-controlled    5. Diarrhea, unspecified type      Outpatient Encounter Medications as of 2/13/2019   Medication Sig Dispense Refill    alendronate (FOSAMAX) 70 MG tablet Take 1 tablet (70 mg total) by mouth every 7 days. 12 tablet 3    ARMOUR THYROID 15 mg Tab TAKE 1 TABLET BY MOUTH EVERY DAY 90 tablet 0    ARMOUR THYROID 60 mg Tab TAKE 1 TABLET BY MOUTH EVERY DAY 90 tablet 1    azathioprine (IMURAN) 50 mg Tab Take 25 mg by mouth once daily.      azelastine (ASTELIN) 137 mcg (0.1 %) nasal spray 2 sprays once daily.      azithromycin (ZITHROMAX) 500 MG tablet Take 2 tablets once  if needed for severe diarrhea. 4 tablet 0    diphenoxylate-atropine 2.5-0.025 mg (LOMOTIL) 2.5-0.025 mg per tablet Take 1 tablet by mouth 3 (three) times daily as needed for Diarrhea. 40 tablet 3    meloxicam (MOBIC) 15 MG tablet Take 1 tablet by mouth once daily.  3    predniSONE (DELTASONE) 10 MG tablet 3 tablets every AM x 7 days, then 2 tablets every AM x 7 days-stop 35 tablet 1    sumatriptan (IMITREX) 25 MG Tab Take 2 tablets (50 mg total) by mouth as needed. 30 tablet 6    tizanidine (ZANAFLEX) 4 MG tablet Take 4 mg by mouth nightly as needed.       tramadol-acetaminophen 37.5-325 mg (ULTRACET) 37.5-325 mg Tab Take 1 tablet by mouth as needed.      trazodone (DESYREL) 50 MG tablet Take 1 tablet by mouth every evening.  3    valACYclovir (VALTREX) 500 MG tablet Take 1 tablet (500 mg total) by mouth once daily. 90 tablet 3    venlafaxine (EFFEXOR) 75 MG tablet Take 75 mg by mouth once daily.      [DISCONTINUED] diphenoxylate-atropine 2.5-0.025 mg (LOMOTIL) 2.5-0.025 mg per tablet Take 1 tablet by mouth 3 (three) times daily as needed.  0    [DISCONTINUED] montelukast (SINGULAIR) 10 mg tablet Take 1 tablet by mouth daily as needed.  0    [DISCONTINUED] sumatriptan (IMITREX) 25 MG Tab Take 50 mg by mouth as needed.      [DISCONTINUED] valACYclovir (VALTREX) 500 MG tablet TAKE 1 TABLET BY MOUTH EVERY DAY 90 tablet 0     No facility-administered encounter medications on file as of 2/13/2019.        Plan:   Post viral URI with cough   Trial of prednisone  30 mg x 7 days then 20 mg x 7 days.  Problem List Items Addressed This Visit     None      Visit Diagnoses     Cough    -  Primary    Herpes        Relevant Medications    valACYclovir (VALTREX) 500 MG tablet    History of migraine headaches        Relevant Medications    sumatriptan (IMITREX) 25 MG Tab    Asthma, mild intermittent, well-controlled        Relevant Medications    predniSONE (DELTASONE) 10 MG tablet    Diarrhea, unspecified type         Relevant Medications    diphenoxylate-atropine 2.5-0.025 mg (LOMOTIL) 2.5-0.025 mg per tablet

## 2019-02-20 DIAGNOSIS — R05.3 COUGH, PERSISTENT: ICD-10-CM

## 2019-02-21 ENCOUNTER — HOSPITAL ENCOUNTER (OUTPATIENT)
Dept: RADIOLOGY | Facility: HOSPITAL | Age: 59
Discharge: HOME | End: 2019-02-21
Attending: INTERNAL MEDICINE
Payer: COMMERCIAL

## 2019-02-21 DIAGNOSIS — R05.3 COUGH, PERSISTENT: ICD-10-CM

## 2019-02-21 PROCEDURE — 71250 CT THORAX DX C-: CPT | Mod: 26,,, | Performed by: RADIOLOGY

## 2019-02-21 PROCEDURE — 71250 CT CHEST WITHOUT CONTRAST: ICD-10-PCS | Mod: 26,,, | Performed by: RADIOLOGY

## 2019-02-21 PROCEDURE — 71250 CT THORAX DX C-: CPT | Mod: TC

## 2019-02-25 ENCOUNTER — LAB VISIT (OUTPATIENT)
Dept: LAB | Facility: HOSPITAL | Age: 59
End: 2019-02-25
Attending: INTERNAL MEDICINE
Payer: COMMERCIAL

## 2019-02-25 DIAGNOSIS — R05.9 COUGH: ICD-10-CM

## 2019-02-25 DIAGNOSIS — R05.9 COUGH: Primary | ICD-10-CM

## 2019-02-25 LAB
ALBUMIN SERPL BCP-MCNC: 3.9 G/DL
ALP SERPL-CCNC: 56 U/L
ALT SERPL W/O P-5'-P-CCNC: 9 U/L
ANION GAP SERPL CALC-SCNC: 8 MMOL/L
AST SERPL-CCNC: 17 U/L
BASOPHILS # BLD AUTO: 0.02 K/UL
BASOPHILS NFR BLD: 0.3 %
BILIRUB SERPL-MCNC: 0.3 MG/DL
BUN SERPL-MCNC: 13 MG/DL
CALCIUM SERPL-MCNC: 9.6 MG/DL
CHLORIDE SERPL-SCNC: 101 MMOL/L
CO2 SERPL-SCNC: 30 MMOL/L
CREAT SERPL-MCNC: 0.7 MG/DL
DIFFERENTIAL METHOD: ABNORMAL
EOSINOPHIL # BLD AUTO: 0 K/UL
EOSINOPHIL NFR BLD: 0.1 %
ERYTHROCYTE [DISTWIDTH] IN BLOOD BY AUTOMATED COUNT: 12.9 %
ERYTHROCYTE [SEDIMENTATION RATE] IN BLOOD BY WESTERGREN METHOD: <2 MM/HR
EST. GFR  (AFRICAN AMERICAN): >60 ML/MIN/1.73 M^2
EST. GFR  (NON AFRICAN AMERICAN): >60 ML/MIN/1.73 M^2
GLUCOSE SERPL-MCNC: 124 MG/DL
HCT VFR BLD AUTO: 39.3 %
HGB BLD-MCNC: 12.9 G/DL
IMM GRANULOCYTES # BLD AUTO: 0.06 K/UL
IMM GRANULOCYTES NFR BLD AUTO: 0.8 %
LYMPHOCYTES # BLD AUTO: 0.4 K/UL
LYMPHOCYTES NFR BLD: 4.4 %
MCH RBC QN AUTO: 35.1 PG
MCHC RBC AUTO-ENTMCNC: 32.8 G/DL
MCV RBC AUTO: 107 FL
MONOCYTES # BLD AUTO: 0.2 K/UL
MONOCYTES NFR BLD: 2.7 %
NEUTROPHILS # BLD AUTO: 7.2 K/UL
NEUTROPHILS NFR BLD: 91.7 %
NRBC BLD-RTO: 0 /100 WBC
PLATELET # BLD AUTO: 186 K/UL
PMV BLD AUTO: 10.1 FL
POTASSIUM SERPL-SCNC: 4.7 MMOL/L
PROT SERPL-MCNC: 6.6 G/DL
RBC # BLD AUTO: 3.67 M/UL
SODIUM SERPL-SCNC: 139 MMOL/L
WBC # BLD AUTO: 7.87 K/UL

## 2019-02-25 PROCEDURE — 85025 COMPLETE CBC W/AUTO DIFF WBC: CPT

## 2019-02-25 PROCEDURE — 85652 RBC SED RATE AUTOMATED: CPT

## 2019-02-25 PROCEDURE — 36415 COLL VENOUS BLD VENIPUNCTURE: CPT | Mod: PO

## 2019-02-25 PROCEDURE — 80053 COMPREHEN METABOLIC PANEL: CPT

## 2019-03-26 ENCOUNTER — OFFICE VISIT (OUTPATIENT)
Dept: PULMONOLOGY | Facility: CLINIC | Age: 59
End: 2019-03-26
Payer: COMMERCIAL

## 2019-03-26 VITALS
HEIGHT: 68 IN | DIASTOLIC BLOOD PRESSURE: 72 MMHG | OXYGEN SATURATION: 97 % | BODY MASS INDEX: 20.38 KG/M2 | SYSTOLIC BLOOD PRESSURE: 112 MMHG | HEART RATE: 96 BPM | WEIGHT: 134.5 LBS

## 2019-03-26 DIAGNOSIS — R05.9 COUGH: Primary | ICD-10-CM

## 2019-03-26 DIAGNOSIS — R05.3 COUGH, PERSISTENT: Primary | ICD-10-CM

## 2019-03-26 PROCEDURE — 99999 PR PBB SHADOW E&M-EST. PATIENT-LVL III: ICD-10-PCS | Mod: PBBFAC,,, | Performed by: INTERNAL MEDICINE

## 2019-03-26 PROCEDURE — 3008F PR BODY MASS INDEX (BMI) DOCUMENTED: ICD-10-PCS | Mod: CPTII,S$GLB,, | Performed by: INTERNAL MEDICINE

## 2019-03-26 PROCEDURE — 3008F BODY MASS INDEX DOCD: CPT | Mod: CPTII,S$GLB,, | Performed by: INTERNAL MEDICINE

## 2019-03-26 PROCEDURE — 99214 OFFICE O/P EST MOD 30 MIN: CPT | Mod: 25,S$GLB,, | Performed by: INTERNAL MEDICINE

## 2019-03-26 PROCEDURE — 94640 AIRWAY INHALATION TREATMENT: CPT | Mod: S$GLB,,, | Performed by: INTERNAL MEDICINE

## 2019-03-26 PROCEDURE — 94640 PR INHAL RX, AIRWAY OBST/DX SPUTUM INDUCT: ICD-10-PCS | Mod: S$GLB,,, | Performed by: INTERNAL MEDICINE

## 2019-03-26 PROCEDURE — 99999 PR PBB SHADOW E&M-EST. PATIENT-LVL III: CPT | Mod: PBBFAC,,, | Performed by: INTERNAL MEDICINE

## 2019-03-26 PROCEDURE — 99214 PR OFFICE/OUTPT VISIT, EST, LEVL IV, 30-39 MIN: ICD-10-PCS | Mod: 25,S$GLB,, | Performed by: INTERNAL MEDICINE

## 2019-03-26 NOTE — PROGRESS NOTES
Subjective:      Patient ID: Nuris Cm is a 58 y.o. female.    Chief Complaint: Cough (hoarseness) and Shortness of Breath    HPI   Patient was doing well relative to her past cough until last Friday when she was in a show room looking at rugs and after spending several minutes with the rugs being tossed around and the dust flying she had acute exacerbation of cough.  Called me and I started her on 30 mg of Prednisone. This was an obvious trigger for her cough syndrome.   I reviewed with her the recent Ct done in my quest for a source for her cough. She has a small density in the apical portion of the right upper lobe; pleural based. She received irradiation for right sided breast cancer >10 years ago and this finding was present on a chest x-ray 2012.     No flowsheet data found.  Review of Systems   Constitutional: Negative.    HENT: Positive for postnasal drip and sinus pressure.    Eyes: Negative.    Respiratory: Positive for cough.    Cardiovascular: Negative.    Genitourinary: Negative.    Endocrine: Hx of Hashimoto's Thyroiditis on replacement.   Musculoskeletal: Negative.    Skin: Negative.    Gastrointestinal: Negative.         Auto immune hepatitis  On low dose imuran    Neurological: Negative.    Psychiatric/Behavioral: Negative.    Hx of bilateral mastectomies for breast cancer, Has high genetic index for can and had hysterectomy  Objective:     Physical Exam   Constitutional: She is oriented to person, place, and time. She appears well-developed and well-nourished. No distress.   HENT:   Head: Normocephalic and atraumatic.   Right Ear: External ear normal.   Left Ear: External ear normal.   Nose: Nose normal.   Mouth/Throat: Oropharynx is clear and moist.   Eyes: Pupils are equal, round, and reactive to light. Conjunctivae and EOM are normal.   Neck: Normal range of motion. Neck supple. No JVD present. No thyromegaly present.   Cardiovascular: Normal rate, regular rhythm, normal heart sounds and  intact distal pulses. Exam reveals no gallop.   No murmur heard.  Pulmonary/Chest: Breath sounds normal. No stridor. No respiratory distress. She has no wheezes. She has no rales. She exhibits no tenderness.   Clear without wheezing    Peak flow 380 l/min  Ally to 440 after a xopenex nebulizer treatment.   Abdominal: Soft. Bowel sounds are normal. She exhibits no distension and no mass. There is no tenderness. There is no rebound and no guarding.   Musculoskeletal: Normal range of motion. She exhibits no edema.   Lymphadenopathy:     She has no cervical adenopathy.   Neurological: She is alert and oriented to person, place, and time. She has normal reflexes. She displays normal reflexes. No cranial nerve deficit.   Skin: Skin is warm and dry. No rash noted.   Psychiatric: She has a normal mood and affect. Her behavior is normal. Judgment and thought content normal.   Nursing note and vitals reviewed.      Assessment:     No diagnosis found.  Outpatient Encounter Medications as of 3/26/2019   Medication Sig Dispense Refill    alendronate (FOSAMAX) 70 MG tablet Take 1 tablet (70 mg total) by mouth every 7 days. 12 tablet 3    ARMOUR THYROID 15 mg Tab TAKE 1 TABLET BY MOUTH EVERY DAY 90 tablet 0    ARMOUR THYROID 60 mg Tab TAKE 1 TABLET BY MOUTH EVERY DAY 90 tablet 1    azathioprine (IMURAN) 50 mg Tab Take 25 mg by mouth once daily.      azelastine (ASTELIN) 137 mcg (0.1 %) nasal spray 2 sprays once daily.      azithromycin (ZITHROMAX) 500 MG tablet Take 2 tablets once if needed for severe diarrhea. 4 tablet 0    diphenoxylate-atropine 2.5-0.025 mg (LOMOTIL) 2.5-0.025 mg per tablet Take 1 tablet by mouth 3 (three) times daily as needed for Diarrhea. 40 tablet 3    meloxicam (MOBIC) 15 MG tablet Take 1 tablet by mouth once daily.  3    predniSONE (DELTASONE) 10 MG tablet 3 tablets every AM x 7 days, then 2 tablets every AM x 7 days-stop 35 tablet 1    sumatriptan (IMITREX) 25 MG Tab Take 2 tablets (50 mg  total) by mouth as needed. 30 tablet 6    tizanidine (ZANAFLEX) 4 MG tablet Take 4 mg by mouth nightly as needed.       tramadol-acetaminophen 37.5-325 mg (ULTRACET) 37.5-325 mg Tab Take 1 tablet by mouth as needed.      trazodone (DESYREL) 50 MG tablet Take 1 tablet by mouth every evening.  3    valACYclovir (VALTREX) 500 MG tablet Take 1 tablet (500 mg total) by mouth once daily. 90 tablet 3    venlafaxine (EFFEXOR) 75 MG tablet Take 75 mg by mouth once daily.       No facility-administered encounter medications on file as of 3/26/2019.        Plan:   Has a sample of symbicort. Will start bid and continue on prednisone and will taper at the end of the week  Will get a IgE titer.(,65)  Problem List Items Addressed This Visit     None

## 2019-05-21 ENCOUNTER — APPOINTMENT (OUTPATIENT)
Dept: RADIOLOGY | Facility: CLINIC | Age: 59
End: 2019-05-21
Attending: INTERNAL MEDICINE
Payer: COMMERCIAL

## 2019-05-21 DIAGNOSIS — M81.0 OSTEOPOROSIS, UNSPECIFIED OSTEOPOROSIS TYPE, UNSPECIFIED PATHOLOGICAL FRACTURE PRESENCE: ICD-10-CM

## 2019-05-21 PROCEDURE — 77080 DXA BONE DENSITY AXIAL: CPT | Mod: 26,,, | Performed by: INTERNAL MEDICINE

## 2019-05-21 PROCEDURE — 77080 DXA BONE DENSITY AXIAL: CPT | Mod: TC,PO

## 2019-05-21 PROCEDURE — 77080 DEXA BONE DENSITY SPINE HIP: ICD-10-PCS | Mod: 26,,, | Performed by: INTERNAL MEDICINE

## 2019-05-28 ENCOUNTER — PATIENT MESSAGE (OUTPATIENT)
Dept: ENDOCRINOLOGY | Facility: CLINIC | Age: 59
End: 2019-05-28

## 2019-05-28 DIAGNOSIS — M81.0 OSTEOPOROSIS, UNSPECIFIED OSTEOPOROSIS TYPE, UNSPECIFIED PATHOLOGICAL FRACTURE PRESENCE: Primary | ICD-10-CM

## 2019-05-28 RX ORDER — ALENDRONATE SODIUM 70 MG/1
70 TABLET ORAL
Qty: 12 TABLET | Refills: 3 | Status: SHIPPED | OUTPATIENT
Start: 2019-05-28 | End: 2021-07-01 | Stop reason: SDUPTHER

## 2019-05-31 ENCOUNTER — PATIENT MESSAGE (OUTPATIENT)
Dept: ENDOCRINOLOGY | Facility: CLINIC | Age: 59
End: 2019-05-31

## 2019-05-31 DIAGNOSIS — M81.0 OSTEOPOROSIS, UNSPECIFIED OSTEOPOROSIS TYPE, UNSPECIFIED PATHOLOGICAL FRACTURE PRESENCE: Primary | ICD-10-CM

## 2019-07-23 RX ORDER — SULFAMETHOXAZOLE AND TRIMETHOPRIM 800; 160 MG/1; MG/1
TABLET ORAL
Qty: 90 TABLET | Refills: 1 | Status: SHIPPED | OUTPATIENT
Start: 2019-07-23 | End: 2019-11-13 | Stop reason: SDUPTHER

## 2019-09-17 ENCOUNTER — OFFICE VISIT (OUTPATIENT)
Dept: URGENT CARE | Facility: CLINIC | Age: 59
End: 2019-09-17
Payer: COMMERCIAL

## 2019-09-17 VITALS
HEART RATE: 83 BPM | SYSTOLIC BLOOD PRESSURE: 126 MMHG | DIASTOLIC BLOOD PRESSURE: 63 MMHG | TEMPERATURE: 97 F | WEIGHT: 134 LBS | BODY MASS INDEX: 20.31 KG/M2 | RESPIRATION RATE: 19 BRPM | OXYGEN SATURATION: 97 % | HEIGHT: 68 IN

## 2019-09-17 DIAGNOSIS — L03.116 CELLULITIS OF LEFT LOWER EXTREMITY: Primary | ICD-10-CM

## 2019-09-17 PROCEDURE — 3008F BODY MASS INDEX DOCD: CPT | Mod: CPTII,S$GLB,, | Performed by: PHYSICIAN ASSISTANT

## 2019-09-17 PROCEDURE — 99214 PR OFFICE/OUTPT VISIT, EST, LEVL IV, 30-39 MIN: ICD-10-PCS | Mod: S$GLB,,, | Performed by: PHYSICIAN ASSISTANT

## 2019-09-17 PROCEDURE — 99214 OFFICE O/P EST MOD 30 MIN: CPT | Mod: S$GLB,,, | Performed by: PHYSICIAN ASSISTANT

## 2019-09-17 PROCEDURE — 3008F PR BODY MASS INDEX (BMI) DOCUMENTED: ICD-10-PCS | Mod: CPTII,S$GLB,, | Performed by: PHYSICIAN ASSISTANT

## 2019-09-17 RX ORDER — ARIPIPRAZOLE 2 MG/1
5 TABLET ORAL DAILY
COMMUNITY

## 2019-09-17 RX ORDER — CEPHALEXIN 500 MG/1
500 CAPSULE ORAL 4 TIMES DAILY
Qty: 28 CAPSULE | Refills: 0 | Status: SHIPPED | OUTPATIENT
Start: 2019-09-17 | End: 2019-09-24

## 2019-09-17 RX ORDER — CELECOXIB 200 MG/1
200 CAPSULE ORAL DAILY
COMMUNITY

## 2019-09-17 RX ORDER — MUPIROCIN 20 MG/G
OINTMENT TOPICAL
Qty: 22 G | Refills: 1 | Status: SHIPPED | OUTPATIENT
Start: 2019-09-17

## 2019-09-17 NOTE — PATIENT INSTRUCTIONS
Please follow-up with your primary provider within 1 week.  Please go to the emergency department if you experience any fevers, nausea, vomiting, abdominal pain, diarrhea.  Please go to the emergency department if this begins streaking redness or if this worsens.  I have prescribed an antibiotic oral and topical.  Use the topical antibiotic over the next 3 days.      You can wait until tomorrow to see if this improves with only topical antibiotic.  If it does not, please take oral antibiotic to completion.        Cellulitis  Cellulitis is an infection of the deep layers of skin. A break in the skin, such as a cut or scratch, can let bacteria under the skin. If the bacteria get to deep layers of the skin, it can be serious. If not treated, cellulitis can get into the bloodstream and lymph nodes. The infection can then spread throughout the body. This causes serious illness.  Cellulitis causes the affected skin to become red, swollen, warm, and sore. The reddened areas have a visible border. An open sore may leak fluid (pus). You may have a fever, chills, and pain.  Cellulitis is treated with antibiotics taken for 7 to 10 days. An open sore may be cleaned and covered with cool wet gauze. Symptoms should get better 1 to 2 days after treatment is started. Make sure to take all the antibiotics for the full number of days until they are gone. Keep taking the medicine even if your symptoms go away.  Home care  Follow these tips:  · Limit the use of the part of your body with cellulitis.   · If the infection is on your leg, keep your leg raised while sitting. This will help to reduce swelling.  · Take all of the antibiotic medicine exactly as directed until it is gone. Do not miss any doses, especially during the first 7 days. Dont stop taking the medicine when your symptoms get better.  · Keep the affected area clean and dry.  · Wash your hands with soap and warm water before and after touching your skin. Anyone else who  touches your skin should also wash his or her hands. Don't share towels.  Follow-up care  Follow up with your healthcare provider, or as advised. If your infection does not go away on the first antibiotic, your healthcare provider will prescribe a different one.  When to seek medical advice  Call your healthcare provider right away if any of these occur:  · Red areas that spread  · Swelling or pain that gets worse  · Fluid leaking from the skin (pus)  · Fever higher of 100.4º F (38.0º C) or higher after 2 days on antibiotics  Date Last Reviewed: 9/1/2016  © 0058-8540 [employer]. [address] [place] [address], [place] [address]. All rights reserved. This information is not intended as a substitute for professional medical care. Always follow your healthcare professional's instructions.

## 2019-09-17 NOTE — PROGRESS NOTES
"Subjective:       Patient ID: Nuris Cm is a 58 y.o. female.    Vitals:  height is 5' 8" (1.727 m) and weight is 60.8 kg (134 lb). Her oral temperature is 97.2 °F (36.2 °C). Her blood pressure is 126/63 and her pulse is 83. Her respiration is 19 and oxygen saturation is 97%.     Chief Complaint: Leg Injury    Patient hit leg on car door 4 days ago and has small skin tear that claims is still leaking. She has used neosporin and a bandaid.    This is a 58-year-old female who presents to Urgent Care complaining of left scrape to her leg after she bumped her leg on the car door 4 days ago.  She reports bleeding to the area immediately and states blood and clear fluid has been draining since then.  She reports some mild redness around the area.    Leg Pain    The incident occurred 3 to 5 days ago. The incident occurred in the street. The injury mechanism was a direct blow. The pain is present in the left leg. The quality of the pain is described as aching. The pain is mild. The pain has been constant since onset. Pertinent negatives include no inability to bear weight, loss of motion, loss of sensation, muscle weakness, numbness or tingling. She reports no foreign bodies present. Nothing aggravates the symptoms. Treatments tried: neosporin, pressure. The treatment provided moderate relief.       Constitution: Negative for chills, fatigue and fever.   HENT: Negative for congestion and sore throat.    Neck: Negative for painful lymph nodes.   Cardiovascular: Negative for chest pain and leg swelling.   Eyes: Negative for double vision and blurred vision.   Respiratory: Negative for cough and shortness of breath.    Gastrointestinal: Negative for nausea, vomiting and diarrhea.   Genitourinary: Negative for dysuria, frequency, urgency and history of kidney stones.   Musculoskeletal: Negative for joint pain, joint swelling, muscle cramps and muscle ache.   Skin: Negative for color change, pale, rash, erythema and bruising. "   Allergic/Immunologic: Negative for seasonal allergies.   Neurological: Negative for dizziness, history of vertigo, light-headedness, passing out, headaches and numbness.   Hematologic/Lymphatic: Negative for swollen lymph nodes.   Psychiatric/Behavioral: Negative for nervous/anxious, sleep disturbance and depression. The patient is not nervous/anxious.        Objective:      Physical Exam   Constitutional: She is oriented to person, place, and time. She appears well-developed and well-nourished.   HENT:   Head: Normocephalic and atraumatic. Head is without abrasion, without contusion and without laceration.   Right Ear: External ear normal.   Left Ear: External ear normal.   Nose: Nose normal.   Mouth/Throat: Oropharynx is clear and moist.   Eyes: Pupils are equal, round, and reactive to light. Conjunctivae, EOM and lids are normal.   Neck: Trachea normal, full passive range of motion without pain and phonation normal. Neck supple.   Cardiovascular: Normal rate, regular rhythm and normal heart sounds.   Pulmonary/Chest: Effort normal and breath sounds normal. No stridor. No respiratory distress.   Musculoskeletal: Normal range of motion.   Full range of motion in bilateral ankles and knees and feet without any pain. Good pulses bilaterally.   Neurological: She is alert and oriented to person, place, and time.   Skin: Skin is warm, dry and intact. Capillary refill takes less than 2 seconds. No abrasion, no bruising, no burn, no ecchymosis, no laceration, no lesion and no rash noted. No erythema.        Psychiatric: She has a normal mood and affect. Her speech is normal and behavior is normal. Judgment and thought content normal. Cognition and memory are normal.   Nursing note and vitals reviewed.              Assessment:       1. Cellulitis of left lower extremity        Plan:             Cellulitis of left lower extremity  -     mupirocin (BACTROBAN) 2 % ointment; Apply to affected area 3 times daily  Dispense: 22  g; Refill: 1  -     cephALEXin (KEFLEX) 500 MG capsule; Take 1 capsule (500 mg total) by mouth 4 (four) times daily. for 7 days  Dispense: 28 capsule; Refill: 0            Patient Instructions   Please follow-up with your primary provider within 1 week.  Please go to the emergency department if you experience any fevers, nausea, vomiting, abdominal pain, diarrhea.  Please go to the emergency department if this begins streaking redness or if this worsens.  I have prescribed an antibiotic oral and topical.  Use the topical antibiotic over the next 3 days.      You can wait until tomorrow to see if this improves with only topical antibiotic.  If it does not, please take oral antibiotic to completion.        Cellulitis  Cellulitis is an infection of the deep layers of skin. A break in the skin, such as a cut or scratch, can let bacteria under the skin. If the bacteria get to deep layers of the skin, it can be serious. If not treated, cellulitis can get into the bloodstream and lymph nodes. The infection can then spread throughout the body. This causes serious illness.  Cellulitis causes the affected skin to become red, swollen, warm, and sore. The reddened areas have a visible border. An open sore may leak fluid (pus). You may have a fever, chills, and pain.  Cellulitis is treated with antibiotics taken for 7 to 10 days. An open sore may be cleaned and covered with cool wet gauze. Symptoms should get better 1 to 2 days after treatment is started. Make sure to take all the antibiotics for the full number of days until they are gone. Keep taking the medicine even if your symptoms go away.  Home care  Follow these tips:  · Limit the use of the part of your body with cellulitis.   · If the infection is on your leg, keep your leg raised while sitting. This will help to reduce swelling.  · Take all of the antibiotic medicine exactly as directed until it is gone. Do not miss any doses, especially during the first 7 days. Dont  stop taking the medicine when your symptoms get better.  · Keep the affected area clean and dry.  · Wash your hands with soap and warm water before and after touching your skin. Anyone else who touches your skin should also wash his or her hands. Don't share towels.  Follow-up care  Follow up with your healthcare provider, or as advised. If your infection does not go away on the first antibiotic, your healthcare provider will prescribe a different one.  When to seek medical advice  Call your healthcare provider right away if any of these occur:  · Red areas that spread  · Swelling or pain that gets worse  · Fluid leaking from the skin (pus)  · Fever higher of 100.4º F (38.0º C) or higher after 2 days on antibiotics  Date Last Reviewed: 9/1/2016  © 4488-1651 [employer]. [address] [place] [address], [place] [address]. All rights reserved. This information is not intended as a substitute for professional medical care. Always follow your healthcare professional's instructions.

## 2019-11-06 DIAGNOSIS — C50.911 MALIGNANT NEOPLASM OF RIGHT FEMALE BREAST, UNSPECIFIED ESTROGEN RECEPTOR STATUS, UNSPECIFIED SITE OF BREAST: Primary | ICD-10-CM

## 2019-11-06 DIAGNOSIS — E03.8 HYPOTHYROIDISM DUE TO HASHIMOTO'S THYROIDITIS: ICD-10-CM

## 2019-11-06 DIAGNOSIS — K75.4 AUTOIMMUNE HEPATITIS: Primary | ICD-10-CM

## 2019-11-06 DIAGNOSIS — E06.3 HYPOTHYROIDISM DUE TO HASHIMOTO'S THYROIDITIS: ICD-10-CM

## 2019-11-12 ENCOUNTER — LAB VISIT (OUTPATIENT)
Dept: LAB | Facility: HOSPITAL | Age: 59
End: 2019-11-12
Attending: INTERNAL MEDICINE
Payer: COMMERCIAL

## 2019-11-12 DIAGNOSIS — E06.3 HYPOTHYROIDISM DUE TO HASHIMOTO'S THYROIDITIS: ICD-10-CM

## 2019-11-12 DIAGNOSIS — K75.4 AUTOIMMUNE HEPATITIS: ICD-10-CM

## 2019-11-12 DIAGNOSIS — C50.911 MALIGNANT NEOPLASM OF RIGHT FEMALE BREAST, UNSPECIFIED ESTROGEN RECEPTOR STATUS, UNSPECIFIED SITE OF BREAST: ICD-10-CM

## 2019-11-12 DIAGNOSIS — E03.8 HYPOTHYROIDISM DUE TO HASHIMOTO'S THYROIDITIS: ICD-10-CM

## 2019-11-12 LAB
ALBUMIN SERPL BCP-MCNC: 4.1 G/DL (ref 3.5–5.2)
ALP SERPL-CCNC: 54 U/L (ref 55–135)
ALT SERPL W/O P-5'-P-CCNC: 13 U/L (ref 10–44)
AST SERPL-CCNC: 22 U/L (ref 10–40)
BASOPHILS # BLD AUTO: 0.02 K/UL (ref 0–0.2)
BASOPHILS NFR BLD: 0.7 % (ref 0–1.9)
BILIRUB DIRECT SERPL-MCNC: 0.2 MG/DL (ref 0.1–0.3)
BILIRUB SERPL-MCNC: 0.5 MG/DL (ref 0.1–1)
CANCER AG125 SERPL-ACNC: 12 U/ML (ref 0–30)
DIFFERENTIAL METHOD: ABNORMAL
EOSINOPHIL # BLD AUTO: 0.1 K/UL (ref 0–0.5)
EOSINOPHIL NFR BLD: 3 % (ref 0–8)
ERYTHROCYTE [DISTWIDTH] IN BLOOD BY AUTOMATED COUNT: 12.1 % (ref 11.5–14.5)
HCT VFR BLD AUTO: 41.2 % (ref 37–48.5)
HGB BLD-MCNC: 14 G/DL (ref 12–16)
IMM GRANULOCYTES # BLD AUTO: 0.01 K/UL (ref 0–0.04)
IMM GRANULOCYTES NFR BLD AUTO: 0.3 % (ref 0–0.5)
LYMPHOCYTES # BLD AUTO: 0.5 K/UL (ref 1–4.8)
LYMPHOCYTES NFR BLD: 17.2 % (ref 18–48)
MCH RBC QN AUTO: 36.8 PG (ref 27–31)
MCHC RBC AUTO-ENTMCNC: 34 G/DL (ref 32–36)
MCV RBC AUTO: 108 FL (ref 82–98)
MONOCYTES # BLD AUTO: 0.4 K/UL (ref 0.3–1)
MONOCYTES NFR BLD: 14.1 % (ref 4–15)
NEUTROPHILS # BLD AUTO: 1.9 K/UL (ref 1.8–7.7)
NEUTROPHILS NFR BLD: 64.7 % (ref 38–73)
NRBC BLD-RTO: 0 /100 WBC
PLATELET # BLD AUTO: 170 K/UL (ref 150–350)
PMV BLD AUTO: 9.7 FL (ref 9.2–12.9)
PROT SERPL-MCNC: 6.8 G/DL (ref 6–8.4)
RBC # BLD AUTO: 3.8 M/UL (ref 4–5.4)
T4 FREE SERPL-MCNC: 0.75 NG/DL (ref 0.71–1.51)
TSH SERPL DL<=0.005 MIU/L-ACNC: 1.23 UIU/ML (ref 0.4–4)
TSH SERPL DL<=0.005 MIU/L-ACNC: 1.23 UIU/ML (ref 0.4–4)
WBC # BLD AUTO: 2.97 K/UL (ref 3.9–12.7)

## 2019-11-12 PROCEDURE — 84443 ASSAY THYROID STIM HORMONE: CPT

## 2019-11-12 PROCEDURE — 86304 IMMUNOASSAY TUMOR CA 125: CPT

## 2019-11-12 PROCEDURE — 36415 COLL VENOUS BLD VENIPUNCTURE: CPT | Mod: PO

## 2019-11-12 PROCEDURE — 84439 ASSAY OF FREE THYROXINE: CPT

## 2019-11-12 PROCEDURE — 85025 COMPLETE CBC W/AUTO DIFF WBC: CPT

## 2019-11-12 PROCEDURE — 80076 HEPATIC FUNCTION PANEL: CPT

## 2019-11-13 ENCOUNTER — OFFICE VISIT (OUTPATIENT)
Dept: ENDOCRINOLOGY | Facility: CLINIC | Age: 59
End: 2019-11-13
Payer: COMMERCIAL

## 2019-11-13 VITALS
BODY MASS INDEX: 21.5 KG/M2 | HEIGHT: 68 IN | DIASTOLIC BLOOD PRESSURE: 80 MMHG | WEIGHT: 141.88 LBS | SYSTOLIC BLOOD PRESSURE: 124 MMHG

## 2019-11-13 DIAGNOSIS — E06.3 HYPOTHYROIDISM DUE TO HASHIMOTO'S THYROIDITIS: Primary | ICD-10-CM

## 2019-11-13 DIAGNOSIS — E03.8 HYPOTHYROIDISM DUE TO HASHIMOTO'S THYROIDITIS: Primary | ICD-10-CM

## 2019-11-13 DIAGNOSIS — M81.0 OSTEOPOROSIS, UNSPECIFIED OSTEOPOROSIS TYPE, UNSPECIFIED PATHOLOGICAL FRACTURE PRESENCE: ICD-10-CM

## 2019-11-13 DIAGNOSIS — K75.4 AUTOIMMUNE HEPATITIS: ICD-10-CM

## 2019-11-13 PROCEDURE — 99999 PR PBB SHADOW E&M-EST. PATIENT-LVL III: CPT | Mod: PBBFAC,,, | Performed by: INTERNAL MEDICINE

## 2019-11-13 PROCEDURE — 99214 OFFICE O/P EST MOD 30 MIN: CPT | Mod: S$GLB,,, | Performed by: INTERNAL MEDICINE

## 2019-11-13 PROCEDURE — 99214 PR OFFICE/OUTPT VISIT, EST, LEVL IV, 30-39 MIN: ICD-10-PCS | Mod: S$GLB,,, | Performed by: INTERNAL MEDICINE

## 2019-11-13 PROCEDURE — 3008F PR BODY MASS INDEX (BMI) DOCUMENTED: ICD-10-PCS | Mod: CPTII,S$GLB,, | Performed by: INTERNAL MEDICINE

## 2019-11-13 PROCEDURE — 3008F BODY MASS INDEX DOCD: CPT | Mod: CPTII,S$GLB,, | Performed by: INTERNAL MEDICINE

## 2019-11-13 PROCEDURE — 99999 PR PBB SHADOW E&M-EST. PATIENT-LVL III: ICD-10-PCS | Mod: PBBFAC,,, | Performed by: INTERNAL MEDICINE

## 2019-11-13 RX ORDER — THYROID 60 MG/1
60 TABLET ORAL DAILY
Qty: 90 TABLET | Refills: 3 | Status: SHIPPED | OUTPATIENT
Start: 2019-11-13 | End: 2021-03-03 | Stop reason: SDUPTHER

## 2019-11-13 RX ORDER — THYROID 15 MG/1
1 TABLET ORAL DAILY
Qty: 90 TABLET | Refills: 3 | Status: SHIPPED | OUTPATIENT
Start: 2019-11-13 | End: 2021-07-01

## 2019-11-13 RX ORDER — BUDESONIDE AND FORMOTEROL FUMARATE DIHYDRATE 160; 4.5 UG/1; UG/1
AEROSOL RESPIRATORY (INHALATION)
Refills: 1 | COMMUNITY
Start: 2019-09-25 | End: 2021-07-01

## 2019-11-13 NOTE — ASSESSMENT & PLAN NOTE
--Patient with hypothyroidism  --She is now biochemically euthyroid  --Will continue [place] 75 mg 5 days per week and 60 mg 2 days per week  --Repeat TSH for next follow up visit

## 2019-11-13 NOTE — ASSESSMENT & PLAN NOTE
--Patient with osteoporosis  --No history of fracture  --Last bone density showed worsening at the spine  --She has been taking Fosamax correctly, however, she was taking Fosamax she purchased in [place] for 6-8 months and was also hyperthyroid based on TSH for the past 2 years  --Since she is now euthyroid and taking Fosamax from local pharmacy will continue Fosamax  --She has been on Fosamax continuously for 3 years  --Due for repeat bone density in 5/2021  --Having hip pain so will do bilateral hip x ray  --Will do L spine xray to r/o occult vertebral fracture

## 2019-11-13 NOTE — PROGRESS NOTES
Subjective:      Patient ID: Nuris Cm is a 59 y.o. female.    Chief Complaint:  Hypothyroidism due to Hashimoto's thyroiditis      History of Present Illness  Ms. Cm presents for follow up of Hashimoto's thyroid disease and osteoporosis.   Last visit 9/2018.     Has active history of autoimmune hepatitis treated with Imuran, breast CA (BRCA 1 positive) in remission and likely Celiacs disease on gluten free diet.      TSH now WNL. Taking [place] 75 mg 5 days per week and 60 mg 2 days per week.     Has some fatigue. Denies tremor or heart palpitations.     Has osteoporosis. Last bone density confirmed osteoporosis. Has never had a fragility fracture.      Bone Density 5/2019:  FINDINGS:  Lumbar Spine: Lumbar bone mineral density L1-L4 is 0.914g/cm2, which is a T-score of -1.2. The Z-score is 0.2.    Total Hip: Total hip bone mineral density is 0.710g/cm2.  The T-score is -1.9, and the Z-score is -0.9.    Femoral neck: Bone mineral density is 0.569g/cm2 and the T-score is -2.5 and the Z-score is -1.2 g/cm2.    There is a 19 % risk of a major osteoporotic fracture and a 2 % risk of hip fracture in the next 10 years (FRAX).    Compared with previous DXA, BMD at the lumbar spine has remained stable, and the BMD at the total hip has decreased 7.5%.    She reports taking Fosamax regularly and appropriately. However, she did report that she was taking alendronate she had bought in [place] for 6-8 months prior to the last bone density.     Since her last visit she has been diagnosed with asthma and is on inhaler therapy.       Review of Systems   Constitutional: Negative for chills and fever.   Gastrointestinal: Negative for nausea.       Objective:   Physical Exam   Nursing note and vitals reviewed.  No thyromegaly  DTR's 2 +   No tremor    BP Readings from Last 3 Encounters:   11/13/19 124/80   09/17/19 126/63   03/26/19 112/72     Wt Readings from Last 1 Encounters:   11/13/19 0922 64.4 kg (141 lb 13.9 oz)       Body  mass index is 21.57 kg/m².    Lab Review:   Lab Results   Component Value Date    HGBA1C 5.0 02/06/2018     Lab Results   Component Value Date    CHOL 177 10/02/2018    HDL 62 10/02/2018    LDLCALC 104.2 10/02/2018    TRIG 54 10/02/2018    CHOLHDL 35.0 10/02/2018     Lab Results   Component Value Date     02/25/2019    K 4.7 02/25/2019     02/25/2019    CO2 30 (H) 02/25/2019     (H) 02/25/2019    BUN 13 02/25/2019    CREATININE 0.7 02/25/2019    CALCIUM 9.6 02/25/2019    PROT 6.8 11/12/2019    ALBUMIN 4.1 11/12/2019    BILITOT 0.5 11/12/2019    ALKPHOS 54 (L) 11/12/2019    AST 22 11/12/2019    ALT 13 11/12/2019    ANIONGAP 8 02/25/2019    ESTGFRAFRICA >60.0 02/25/2019    EGFRNONAA >60.0 02/25/2019    TSH 1.225 11/12/2019    TSH 1.225 11/12/2019         Assessment and Plan     Hypothyroidism due to Hashimoto's thyroiditis  --Patient with hypothyroidism  --She is now biochemically euthyroid  --Will continue [place] 75 mg 5 days per week and 60 mg 2 days per week  --Repeat TSH for next follow up visit    Osteoporosis  --Patient with osteoporosis  --No history of fracture  --Last bone density showed worsening at the spine  --She has been taking Fosamax correctly, however, she was taking Fosamax she purchased in [place] for 6-8 months and was also hyperthyroid based on TSH for the past 2 years  --Since she is now euthyroid and taking Fosamax from local pharmacy will continue Fosamax  --She has been on Fosamax continuously for 3 years  --Due for repeat bone density in 5/2021  --Having hip pain so will do bilateral hip x ray  --Will do L spine xray to r/o occult vertebral fracture       Autoimmune hepatitis  --Currently on Imuran  --Followed in [place]    Artemio Devine M.D. Staff Endocrinology

## 2019-11-15 ENCOUNTER — HOSPITAL ENCOUNTER (OUTPATIENT)
Dept: RADIOLOGY | Facility: HOSPITAL | Age: 59
Discharge: HOME | End: 2019-11-15
Attending: INTERNAL MEDICINE
Payer: COMMERCIAL

## 2019-11-15 DIAGNOSIS — K75.4 AUTOIMMUNE HEPATITIS: ICD-10-CM

## 2019-11-15 PROCEDURE — 76700 US EXAM ABDOM COMPLETE: CPT | Mod: 26,,, | Performed by: RADIOLOGY

## 2019-11-15 PROCEDURE — 76700 US ABDOMEN COMPLETE: ICD-10-PCS | Mod: 26,,, | Performed by: RADIOLOGY

## 2019-11-15 PROCEDURE — 76700 US EXAM ABDOM COMPLETE: CPT | Mod: TC

## 2019-11-17 ENCOUNTER — PATIENT MESSAGE (OUTPATIENT)
Dept: PULMONOLOGY | Facility: CLINIC | Age: 59
End: 2019-11-17

## 2019-12-05 ENCOUNTER — TELEPHONE (OUTPATIENT)
Dept: ENDOCRINOLOGY | Facility: CLINIC | Age: 59
End: 2019-12-05

## 2020-01-22 ENCOUNTER — TELEPHONE (OUTPATIENT)
Dept: PULMONOLOGY | Facility: CLINIC | Age: 60
End: 2020-01-22

## 2020-02-18 DIAGNOSIS — B00.9 HERPES: ICD-10-CM

## 2020-02-18 RX ORDER — VALACYCLOVIR HYDROCHLORIDE 500 MG/1
TABLET, FILM COATED ORAL
Qty: 90 TABLET | Refills: 3 | Status: SHIPPED | OUTPATIENT
Start: 2020-02-18 | End: 2021-03-18

## 2020-02-28 DIAGNOSIS — Z86.69 HISTORY OF MIGRAINE HEADACHES: ICD-10-CM

## 2020-02-29 RX ORDER — SUMATRIPTAN SUCCINATE 25 MG/1
TABLET ORAL
Qty: 30 TABLET | Refills: 6 | Status: SHIPPED | OUTPATIENT
Start: 2020-02-29 | End: 2021-06-20

## 2020-05-04 DIAGNOSIS — R19.7 DIARRHEA, UNSPECIFIED TYPE: ICD-10-CM

## 2020-05-04 RX ORDER — DIPHENOXYLATE HYDROCHLORIDE AND ATROPINE SULFATE 2.5; .025 MG/1; MG/1
1 TABLET ORAL 3 TIMES DAILY PRN
Qty: 40 TABLET | Refills: 3 | Status: CANCELLED | OUTPATIENT
Start: 2020-05-04

## 2020-11-18 ENCOUNTER — TELEPHONE (OUTPATIENT)
Dept: PULMONOLOGY | Facility: CLINIC | Age: 60
End: 2020-11-18

## 2020-11-18 NOTE — TELEPHONE ENCOUNTER
----- Message from Jen Garcia sent at 11/18/2020  9:38 AM CST -----  Contact: [phone]  Caller says she wants Grace to call  to discuss Dr. Ambrocio visit after Thanksgiving.    Needs to have all of her blood work and blood counts done.

## 2020-11-18 NOTE — TELEPHONE ENCOUNTER
I called Mrs Cm back about her 12-1-20 appointment with Dr Gurrola in [place]. She wants to have a liver scan and fasting labwork next week to bring to Dr Gurrola. I told her I will clarify what she needs with Dr Kiser and set it up and call her back with the date and time. Zoila Kim LPN.

## 2020-11-19 DIAGNOSIS — E78.5 HYPERLIPIDEMIA, UNSPECIFIED HYPERLIPIDEMIA TYPE: ICD-10-CM

## 2020-11-19 DIAGNOSIS — C50.911 MALIGNANT NEOPLASM OF RIGHT FEMALE BREAST, UNSPECIFIED ESTROGEN RECEPTOR STATUS, UNSPECIFIED SITE OF BREAST: ICD-10-CM

## 2020-11-19 DIAGNOSIS — K75.4 AUTOIMMUNE HEPATITIS: Primary | ICD-10-CM

## 2020-11-27 ENCOUNTER — HOSPITAL ENCOUNTER (OUTPATIENT)
Dept: RADIOLOGY | Facility: HOSPITAL | Age: 60
Discharge: HOME | End: 2020-11-27
Attending: INTERNAL MEDICINE
Payer: COMMERCIAL

## 2020-11-27 DIAGNOSIS — K75.4 AUTOIMMUNE HEPATITIS: ICD-10-CM

## 2020-11-27 PROCEDURE — 93975 VASCULAR STUDY: CPT | Mod: TC

## 2020-11-27 PROCEDURE — 76705 US LIVER WITH DOPPLER: ICD-10-PCS | Mod: 26,XS,, | Performed by: RADIOLOGY

## 2020-11-27 PROCEDURE — 76705 ECHO EXAM OF ABDOMEN: CPT | Mod: 26,XS,, | Performed by: RADIOLOGY

## 2020-11-27 PROCEDURE — 76705 ECHO EXAM OF ABDOMEN: CPT | Mod: TC

## 2020-11-27 PROCEDURE — 93975 US LIVER WITH DOPPLER: ICD-10-PCS | Mod: 26,,, | Performed by: RADIOLOGY

## 2020-11-27 PROCEDURE — 93975 VASCULAR STUDY: CPT | Mod: 26,,, | Performed by: RADIOLOGY

## 2020-12-09 ENCOUNTER — TELEPHONE (OUTPATIENT)
Dept: ENDOCRINOLOGY | Facility: CLINIC | Age: 60
End: 2020-12-09

## 2021-03-01 ENCOUNTER — PATIENT MESSAGE (OUTPATIENT)
Dept: PULMONOLOGY | Facility: CLINIC | Age: 61
End: 2021-03-01

## 2021-03-02 ENCOUNTER — HOSPITAL ENCOUNTER (OUTPATIENT)
Dept: RADIOLOGY | Facility: HOSPITAL | Age: 61
Discharge: HOME | End: 2021-03-02
Attending: INTERNAL MEDICINE
Payer: COMMERCIAL

## 2021-03-02 ENCOUNTER — OFFICE VISIT (OUTPATIENT)
Dept: PULMONOLOGY | Facility: CLINIC | Age: 61
End: 2021-03-02
Payer: COMMERCIAL

## 2021-03-02 VITALS — HEART RATE: 92 BPM | DIASTOLIC BLOOD PRESSURE: 76 MMHG | SYSTOLIC BLOOD PRESSURE: 136 MMHG | OXYGEN SATURATION: 96 %

## 2021-03-02 DIAGNOSIS — S09.90XA HEAD TRAUMA, INITIAL ENCOUNTER: ICD-10-CM

## 2021-03-02 DIAGNOSIS — R19.7 DIARRHEA, UNSPECIFIED TYPE: ICD-10-CM
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
## 2021-03-02 PROCEDURE — 99214 OFFICE O/P EST MOD 30 MIN: CPT | Mod: S$GLB,,, | Performed by: INTERNAL MEDICINE

## 2021-03-02 PROCEDURE — 99999 PR PBB SHADOW E&M-EST. PATIENT-LVL IV: ICD-10-PCS | Mod: PBBFAC,,, | Performed by: INTERNAL MEDICINE

## 2021-03-02 PROCEDURE — 99214 PR OFFICE/OUTPT VISIT, EST, LEVL IV, 30-39 MIN: ICD-10-PCS | Mod: S$GLB,,, | Performed by: INTERNAL MEDICINE

## 2021-03-02 PROCEDURE — 70450 CT HEAD WITHOUT CONTRAST: ICD-10-PCS | Mod: 26,,, | Performed by: RADIOLOGY

## 2021-03-02 PROCEDURE — 70450 CT HEAD/BRAIN W/O DYE: CPT | Mod: TC

## 2021-03-02 PROCEDURE — 70450 CT HEAD/BRAIN W/O DYE: CPT | Mod: 26,,, | Performed by: RADIOLOGY

## 2021-03-02 PROCEDURE — 99999 PR PBB SHADOW E&M-EST. PATIENT-LVL IV: CPT | Mod: PBBFAC,,, | Performed by: INTERNAL MEDICINE

## 2021-03-03 ENCOUNTER — PATIENT MESSAGE (OUTPATIENT)
Dept: ENDOCRINOLOGY | Facility: CLINIC | Age: 61
End: 2021-03-03

## 2021-03-03 DIAGNOSIS — E06.3 HYPOTHYROIDISM DUE TO HASHIMOTO'S THYROIDITIS: Primary | ICD-10-CM

## 2021-03-03 DIAGNOSIS — E03.8 HYPOTHYROIDISM DUE TO HASHIMOTO'S THYROIDITIS: Primary | ICD-10-CM

## 2021-03-03 RX ORDER — THYROID 60 MG/1
60 TABLET ORAL DAILY
Qty: 90 TABLET | Refills: 3 | Status: SHIPPED | OUTPATIENT
Start: 2021-03-03 | End: 2021-07-01

## 2021-03-03 RX ORDER — DIPHENOXYLATE HYDROCHLORIDE AND ATROPINE SULFATE 2.5; .025 MG/1; MG/1
1 TABLET ORAL 3 TIMES DAILY PRN
Qty: 40 TABLET | Refills: 3 | Status: SHIPPED | OUTPATIENT
Start: 2021-03-03

## 2021-03-03 RX ORDER — DIPHENOXYLATE HYDROCHLORIDE AND ATROPINE SULFATE 2.5; .025 MG/1; MG/1
1 TABLET ORAL 4 TIMES DAILY PRN
Qty: 24 TABLET | Refills: 1 | Status: SHIPPED | OUTPATIENT
Start: 2021-03-03 | End: 2021-03-13

## 2021-04-05 DIAGNOSIS — Z00.00 ROUTINE GENERAL MEDICAL EXAMINATION AT A HEALTH CARE FACILITY: Primary | ICD-10-CM

## 2021-04-13 ENCOUNTER — CLINICAL SUPPORT (OUTPATIENT)
Dept: INTERNAL MEDICINE | Facility: CLINIC | Age: 61
End: 2021-04-13
Payer: COMMERCIAL

## 2021-04-13 ENCOUNTER — OFFICE VISIT (OUTPATIENT)
Dept: PULMONOLOGY | Facility: CLINIC | Age: 61
End: 2021-04-13
Payer: COMMERCIAL

## 2021-04-13 ENCOUNTER — HOSPITAL ENCOUNTER (OUTPATIENT)
Dept: CARDIOLOGY | Facility: HOSPITAL | Age: 61
Discharge: HOME | End: 2021-04-13
Attending: INTERNAL MEDICINE
Payer: COMMERCIAL

## 2021-04-13 VITALS
BODY MASS INDEX: 21.37 KG/M2 | BODY MASS INDEX: 21.37 KG/M2 | WEIGHT: 141 LBS | HEART RATE: 93 BPM | SYSTOLIC BLOOD PRESSURE: 110 MMHG | HEIGHT: 68 IN | RESPIRATION RATE: 12 BRPM | HEIGHT: 68 IN | WEIGHT: 141 LBS | DIASTOLIC BLOOD PRESSURE: 70 MMHG

## 2021-04-13 DIAGNOSIS — Z00.00 ANNUAL PHYSICAL EXAM: Primary | ICD-10-CM

## 2021-04-13 DIAGNOSIS — Z00.00 ROUTINE GENERAL MEDICAL EXAMINATION AT A HEALTH CARE FACILITY: ICD-10-CM

## 2021-04-13 LAB
ALBUMIN SERPL BCP-MCNC: 3.8 G/DL (ref 3.5–5.2)
ALP SERPL-CCNC: 72 U/L (ref 55–135)
ALT SERPL W/O P-5'-P-CCNC: 10 U/L (ref 10–44)
ANION GAP SERPL CALC-SCNC: 6 MMOL/L (ref 8–16)
AST SERPL-CCNC: 21 U/L (ref 10–40)
BILIRUB SERPL-MCNC: 0.9 MG/DL (ref 0.1–1)
BUN SERPL-MCNC: 10 MG/DL (ref 8–23)
CALCIUM SERPL-MCNC: 9.6 MG/DL (ref 8.7–10.5)
CHLORIDE SERPL-SCNC: 105 MMOL/L (ref 95–110)
CHOLEST SERPL-MCNC: 194 MG/DL (ref 120–199)
CHOLEST/HDLC SERPL: 3.1 {RATIO} (ref 2–5)
CO2 SERPL-SCNC: 32 MMOL/L (ref 23–29)
CREAT SERPL-MCNC: 0.8 MG/DL (ref 0.5–1.4)
CV STRESS BASE HR: 79 BPM
DIASTOLIC BLOOD PRESSURE: 88 MMHG
ERYTHROCYTE [DISTWIDTH] IN BLOOD BY AUTOMATED COUNT: 12.4 % (ref 11.5–14.5)
EST. GFR  (AFRICAN AMERICAN): >60 ML/MIN/1.73 M^2
EST. GFR  (NON AFRICAN AMERICAN): >60 ML/MIN/1.73 M^2
ESTIMATED AVG GLUCOSE: 97 MG/DL (ref 68–131)
GLUCOSE SERPL-MCNC: 89 MG/DL (ref 70–110)
HBA1C MFR BLD: 5 % (ref 4–5.6)
HCT VFR BLD AUTO: 39.7 % (ref 37–48.5)
HCV AB SERPL QL IA: NEGATIVE
HDLC SERPL-MCNC: 63 MG/DL (ref 40–75)
HDLC SERPL: 32.5 % (ref 20–50)
HGB BLD-MCNC: 13.2 G/DL (ref 12–16)
HIV 1+2 AB+HIV1 P24 AG SERPL QL IA: NEGATIVE
LDLC SERPL CALC-MCNC: 113.6 MG/DL (ref 63–159)
MCH RBC QN AUTO: 36.2 PG (ref 27–31)
MCHC RBC AUTO-ENTMCNC: 33.2 G/DL (ref 32–36)
MCV RBC AUTO: 109 FL (ref 82–98)
NONHDLC SERPL-MCNC: 131 MG/DL
OHS CV CPX 1 MINUTE RECOVERY HEART RATE: 133 BPM
OHS CV CPX 85 PERCENT MAX PREDICTED HEART RATE MALE: 129
OHS CV CPX ESTIMATED METS: 11
OHS CV CPX MAX PREDICTED HEART RATE: 151
OHS CV CPX PATIENT IS FEMALE: 1
OHS CV CPX PATIENT IS MALE: 0
OHS CV CPX PEAK DIASTOLIC BLOOD PRESSURE: 91 MMHG
OHS CV CPX PEAK HEAR RATE: 150 BPM
OHS CV CPX PEAK RATE PRESSURE PRODUCT: NORMAL
OHS CV CPX PEAK SYSTOLIC BLOOD PRESSURE: 172 MMHG
OHS CV CPX PERCENT MAX PREDICTED HEART RATE ACHIEVED: 99
OHS CV CPX RATE PRESSURE PRODUCT PRESENTING: NORMAL
PLATELET # BLD AUTO: 175 K/UL (ref 150–450)
PMV BLD AUTO: 9.2 FL (ref 9.2–12.9)
POTASSIUM SERPL-SCNC: 3.9 MMOL/L (ref 3.5–5.1)
PROT SERPL-MCNC: 6.8 G/DL (ref 6–8.4)
RBC # BLD AUTO: 3.65 M/UL (ref 4–5.4)
SODIUM SERPL-SCNC: 143 MMOL/L (ref 136–145)
STRESS ECHO POST EXERCISE DUR MIN: 6 MINUTES
STRESS ECHO POST EXERCISE DUR SEC: 43 SECONDS
SYSTOLIC BLOOD PRESSURE: 130 MMHG
T4 FREE SERPL-MCNC: 0.77 NG/DL (ref 0.71–1.51)
TRIGL SERPL-MCNC: 87 MG/DL (ref 30–150)
TSH SERPL DL<=0.005 MIU/L-ACNC: 6.45 UIU/ML (ref 0.4–4)
WBC # BLD AUTO: 2.97 K/UL (ref 3.9–12.7)

## 2021-04-13 PROCEDURE — 36415 COLL VENOUS BLD VENIPUNCTURE: CPT | Performed by: INTERNAL MEDICINE

## 2021-04-13 PROCEDURE — 93018 CV STRESS TEST I&R ONLY: CPT | Mod: ,,, | Performed by: INTERNAL MEDICINE

## 2021-04-13 PROCEDURE — 85027 COMPLETE CBC AUTOMATED: CPT | Performed by: INTERNAL MEDICINE

## 2021-04-13 PROCEDURE — 99386 PREV VISIT NEW AGE 40-64: CPT | Mod: S$GLB,,, | Performed by: INTERNAL MEDICINE

## 2021-04-13 PROCEDURE — 84439 ASSAY OF FREE THYROXINE: CPT | Performed by: INTERNAL MEDICINE

## 2021-04-13 PROCEDURE — 84443 ASSAY THYROID STIM HORMONE: CPT | Performed by: INTERNAL MEDICINE

## 2021-04-13 PROCEDURE — 93016 EXERCISE STRESS - EKG (CUPID ONLY): ICD-10-PCS | Mod: ,,, | Performed by: INTERNAL MEDICINE

## 2021-04-13 PROCEDURE — 86703 HIV-1/HIV-2 1 RESULT ANTBDY: CPT | Performed by: INTERNAL MEDICINE

## 2021-04-13 PROCEDURE — 93016 CV STRESS TEST SUPVJ ONLY: CPT | Mod: ,,, | Performed by: INTERNAL MEDICINE

## 2021-04-13 PROCEDURE — 80061 LIPID PANEL: CPT | Performed by: INTERNAL MEDICINE

## 2021-04-13 PROCEDURE — 86803 HEPATITIS C AB TEST: CPT | Performed by: INTERNAL MEDICINE

## 2021-04-13 PROCEDURE — 83036 HEMOGLOBIN GLYCOSYLATED A1C: CPT | Performed by: INTERNAL MEDICINE

## 2021-04-13 PROCEDURE — 93018 EXERCISE STRESS - EKG (CUPID ONLY): ICD-10-PCS | Mod: ,,, | Performed by: INTERNAL MEDICINE

## 2021-04-13 PROCEDURE — 99999 PR PBB SHADOW E&M-EST. PATIENT-LVL IV: CPT | Mod: PBBFAC,,, | Performed by: INTERNAL MEDICINE

## 2021-04-13 PROCEDURE — 80053 COMPREHEN METABOLIC PANEL: CPT | Performed by: INTERNAL MEDICINE

## 2021-04-13 PROCEDURE — 99386 PR PREVENTIVE VISIT,NEW,40-64: ICD-10-PCS | Mod: S$GLB,,, | Performed by: INTERNAL MEDICINE

## 2021-04-13 PROCEDURE — 99999 PR PBB SHADOW E&M-EST. PATIENT-LVL IV: ICD-10-PCS | Mod: PBBFAC,,, | Performed by: INTERNAL MEDICINE

## 2021-04-13 PROCEDURE — 93017 CV STRESS TEST TRACING ONLY: CPT

## 2021-04-16 ENCOUNTER — PATIENT MESSAGE (OUTPATIENT)
Dept: RESEARCH | Facility: HOSPITAL | Age: 61
End: 2021-04-16

## 2021-06-30 ENCOUNTER — PATIENT MESSAGE (OUTPATIENT)
Dept: ENDOCRINOLOGY | Facility: CLINIC | Age: 61
End: 2021-06-30

## 2021-07-01 ENCOUNTER — HOSPITAL ENCOUNTER (OUTPATIENT)
Dept: RADIOLOGY | Facility: HOSPITAL | Age: 61
Discharge: HOME | End: 2021-07-01
Attending: INTERNAL MEDICINE
Payer: COMMERCIAL

## 2021-07-01 ENCOUNTER — PATIENT MESSAGE (OUTPATIENT)
Dept: ENDOCRINOLOGY | Facility: CLINIC | Age: 61
End: 2021-07-01

## 2021-07-01 ENCOUNTER — OFFICE VISIT (OUTPATIENT)
Dept: ENDOCRINOLOGY | Facility: CLINIC | Age: 61
End: 2021-07-01
Payer: COMMERCIAL

## 2021-07-01 VITALS
SYSTOLIC BLOOD PRESSURE: 116 MMHG | BODY MASS INDEX: 22.03 KG/M2 | DIASTOLIC BLOOD PRESSURE: 72 MMHG | WEIGHT: 145.38 LBS | HEIGHT: 68 IN

## 2021-07-01 DIAGNOSIS — E03.8 HYPOTHYROIDISM DUE TO HASHIMOTO'S THYROIDITIS: Primary | ICD-10-CM

## 2021-07-01 DIAGNOSIS — M81.0 OSTEOPOROSIS, UNSPECIFIED OSTEOPOROSIS TYPE, UNSPECIFIED PATHOLOGICAL FRACTURE PRESENCE: ICD-10-CM

## 2021-07-01 DIAGNOSIS — E06.3 HYPOTHYROIDISM DUE TO HASHIMOTO'S THYROIDITIS: Primary | ICD-10-CM

## 2021-07-01 DIAGNOSIS — K75.4 AUTOIMMUNE HEPATITIS: ICD-10-CM

## 2021-07-01 PROCEDURE — 72100 X-RAY EXAM L-S SPINE 2/3 VWS: CPT | Mod: 26,,, | Performed by: RADIOLOGY

## 2021-07-01 PROCEDURE — 72100 X-RAY EXAM L-S SPINE 2/3 VWS: CPT | Mod: TC

## 2021-07-01 PROCEDURE — 72100 XR LUMBAR SPINE AP AND LATERAL: ICD-10-PCS | Mod: 26,,, | Performed by: RADIOLOGY

## 2021-07-01 PROCEDURE — 99214 PR OFFICE/OUTPT VISIT, EST, LEVL IV, 30-39 MIN: ICD-10-PCS | Mod: S$GLB,,, | Performed by: INTERNAL MEDICINE

## 2021-07-01 PROCEDURE — 99999 PR PBB SHADOW E&M-EST. PATIENT-LVL IV: ICD-10-PCS | Mod: PBBFAC,,, | Performed by: INTERNAL MEDICINE

## 2021-07-01 PROCEDURE — 99999 PR PBB SHADOW E&M-EST. PATIENT-LVL IV: CPT | Mod: PBBFAC,,, | Performed by: INTERNAL MEDICINE

## 2021-07-01 PROCEDURE — 99214 OFFICE O/P EST MOD 30 MIN: CPT | Mod: S$GLB,,, | Performed by: INTERNAL MEDICINE

## 2021-07-01 RX ORDER — ALENDRONATE SODIUM 70 MG/1
70 TABLET ORAL
Qty: 12 TABLET | Refills: 3 | Status: SHIPPED | OUTPATIENT
Start: 2021-07-01

## 2021-07-01 RX ORDER — LEVOTHYROXINE SODIUM 112 UG/1
112 TABLET ORAL
Qty: 30 TABLET | Refills: 11 | Status: SHIPPED | OUTPATIENT
Start: 2021-07-01 | End: 2022-07-19 | Stop reason: SDUPTHER

## 2021-07-28 ENCOUNTER — TELEPHONE (OUTPATIENT)
Dept: PULMONOLOGY | Facility: CLINIC | Age: 61
End: 2021-07-28

## 2021-07-28 DIAGNOSIS — Z20.822 CONTACT WITH AND (SUSPECTED) EXPOSURE TO COVID-19: Primary | ICD-10-CM

## 2021-07-28 DIAGNOSIS — R06.02 SHORTNESS OF BREATH: ICD-10-CM

## 2021-07-29 ENCOUNTER — LAB VISIT (OUTPATIENT)
Dept: SPORTS MEDICINE | Facility: CLINIC | Age: 61
End: 2021-07-29
Payer: COMMERCIAL

## 2021-07-29 DIAGNOSIS — R06.02 SHORTNESS OF BREATH: ICD-10-CM

## 2021-07-29 LAB — SARS-COV-2 RNA RESP QL NAA+PROBE: NOT DETECTED

## 2021-07-29 PROCEDURE — U0003 INFECTIOUS AGENT DETECTION BY NUCLEIC ACID (DNA OR RNA); SEVERE ACUTE RESPIRATORY SYNDROME CORONAVIRUS 2 (SARS-COV-2) (CORONAVIRUS DISEASE [COVID-19]), AMPLIFIED PROBE TECHNIQUE, MAKING USE OF HIGH THROUGHPUT TECHNOLOGIES AS DESCRIBED BY CMS-2020-01-R: HCPCS | Performed by: INTERNAL MEDICINE

## 2021-07-29 PROCEDURE — U0005 INFEC AGEN DETEC AMPLI PROBE: HCPCS | Performed by: INTERNAL MEDICINE

## 2021-08-06 ENCOUNTER — APPOINTMENT (OUTPATIENT)
Dept: RADIOLOGY | Facility: CLINIC | Age: 61
End: 2021-08-06
Attending: INTERNAL MEDICINE
Payer: COMMERCIAL

## 2021-08-06 DIAGNOSIS — Z00.00 ROUTINE GENERAL MEDICAL EXAMINATION AT A HEALTH CARE FACILITY: ICD-10-CM

## 2021-08-06 PROCEDURE — 77080 DEXA BONE DENSITY SPINE HIP: ICD-10-PCS | Mod: 26,,, | Performed by: INTERNAL MEDICINE

## 2021-08-06 PROCEDURE — 77080 DXA BONE DENSITY AXIAL: CPT | Mod: 26,,, | Performed by: INTERNAL MEDICINE

## 2021-08-06 PROCEDURE — 77080 DXA BONE DENSITY AXIAL: CPT | Mod: TC,PO

## 2021-08-07 ENCOUNTER — PATIENT MESSAGE (OUTPATIENT)
Dept: ENDOCRINOLOGY | Facility: CLINIC | Age: 61
End: 2021-08-07

## 2021-08-13 ENCOUNTER — PATIENT MESSAGE (OUTPATIENT)
Dept: ENDOCRINOLOGY | Facility: CLINIC | Age: 61
End: 2021-08-13

## 2021-11-22 ENCOUNTER — OFFICE VISIT (OUTPATIENT)
Dept: URGENT CARE | Facility: CLINIC | Age: 61
End: 2021-11-22
Payer: COMMERCIAL

## 2021-11-22 VITALS
TEMPERATURE: 98 F | BODY MASS INDEX: 22.73 KG/M2 | OXYGEN SATURATION: 96 % | RESPIRATION RATE: 17 BRPM | HEART RATE: 83 BPM | WEIGHT: 150 LBS | HEIGHT: 68 IN | SYSTOLIC BLOOD PRESSURE: 124 MMHG | DIASTOLIC BLOOD PRESSURE: 64 MMHG

## 2021-11-22 DIAGNOSIS — S40.011A CONTUSION OF RIGHT SHOULDER, INITIAL ENCOUNTER: Primary | ICD-10-CM

## 2021-11-22 PROCEDURE — 73030 X-RAY EXAM OF SHOULDER: CPT | Mod: FY,RT,S$GLB, | Performed by: RADIOLOGY

## 2021-11-22 PROCEDURE — 99214 PR OFFICE/OUTPT VISIT, EST, LEVL IV, 30-39 MIN: ICD-10-PCS | Mod: S$GLB,,, | Performed by: FAMILY MEDICINE

## 2021-11-22 PROCEDURE — 99214 OFFICE O/P EST MOD 30 MIN: CPT | Mod: S$GLB,,, | Performed by: FAMILY MEDICINE

## 2021-11-22 PROCEDURE — 73030 XR SHOULDER COMPLETE 2 OR MORE VIEWS RIGHT: ICD-10-PCS | Mod: FY,RT,S$GLB, | Performed by: RADIOLOGY

## 2021-11-22 RX ORDER — HYDROCODONE BITARTRATE AND ACETAMINOPHEN 5; 325 MG/1; MG/1
1 TABLET ORAL EVERY 8 HOURS PRN
Qty: 15 TABLET | Refills: 0 | Status: SHIPPED | OUTPATIENT
Start: 2021-11-22 | End: 2021-11-27

## 2021-11-22 RX ORDER — MEMANTINE HYDROCHLORIDE 10 MG/1
10 TABLET ORAL 2 TIMES DAILY
COMMUNITY
Start: 2021-08-03 | End: 2022-04-07

## 2021-11-22 RX ORDER — THYROID 120 MG/1
TABLET ORAL
COMMUNITY
End: 2021-11-22

## 2021-12-07 ENCOUNTER — TELEPHONE (OUTPATIENT)
Dept: ORTHOPEDICS | Facility: CLINIC | Age: 61
End: 2021-12-07
Payer: COMMERCIAL

## 2021-12-08 ENCOUNTER — OFFICE VISIT (OUTPATIENT)
Dept: ORTHOPEDICS | Facility: CLINIC | Age: 61
End: 2021-12-08
Payer: COMMERCIAL

## 2021-12-08 VITALS
HEART RATE: 95 BPM | SYSTOLIC BLOOD PRESSURE: 115 MMHG | BODY MASS INDEX: 22.72 KG/M2 | RESPIRATION RATE: 18 BRPM | WEIGHT: 149.94 LBS | DIASTOLIC BLOOD PRESSURE: 73 MMHG | HEIGHT: 68 IN

## 2021-12-08 DIAGNOSIS — M75.81 ROTATOR CUFF TENDONITIS, RIGHT: Primary | ICD-10-CM

## 2021-12-08 PROCEDURE — 3074F SYST BP LT 130 MM HG: CPT | Mod: CPTII,S$GLB,, | Performed by: ORTHOPAEDIC SURGERY

## 2021-12-08 PROCEDURE — 3074F PR MOST RECENT SYSTOLIC BLOOD PRESSURE < 130 MM HG: ICD-10-PCS | Mod: CPTII,S$GLB,, | Performed by: ORTHOPAEDIC SURGERY

## 2021-12-08 PROCEDURE — 1159F MED LIST DOCD IN RCRD: CPT | Mod: CPTII,S$GLB,, | Performed by: ORTHOPAEDIC SURGERY

## 2021-12-08 PROCEDURE — 99499 NO LOS: ICD-10-PCS | Mod: S$GLB,,, | Performed by: ORTHOPAEDIC SURGERY

## 2021-12-08 PROCEDURE — 3008F PR BODY MASS INDEX (BMI) DOCUMENTED: ICD-10-PCS | Mod: CPTII,S$GLB,, | Performed by: ORTHOPAEDIC SURGERY

## 2021-12-08 PROCEDURE — 99999 PR PBB SHADOW E&M-EST. PATIENT-LVL IV: ICD-10-PCS | Mod: PBBFAC,,, | Performed by: ORTHOPAEDIC SURGERY

## 2021-12-08 PROCEDURE — 1159F PR MEDICATION LIST DOCUMENTED IN MEDICAL RECORD: ICD-10-PCS | Mod: CPTII,S$GLB,, | Performed by: ORTHOPAEDIC SURGERY

## 2021-12-08 PROCEDURE — 3008F BODY MASS INDEX DOCD: CPT | Mod: CPTII,S$GLB,, | Performed by: ORTHOPAEDIC SURGERY

## 2021-12-08 PROCEDURE — 3078F DIAST BP <80 MM HG: CPT | Mod: CPTII,S$GLB,, | Performed by: ORTHOPAEDIC SURGERY

## 2021-12-08 PROCEDURE — 3078F PR MOST RECENT DIASTOLIC BLOOD PRESSURE < 80 MM HG: ICD-10-PCS | Mod: CPTII,S$GLB,, | Performed by: ORTHOPAEDIC SURGERY

## 2021-12-08 PROCEDURE — 99499 UNLISTED E&M SERVICE: CPT | Mod: S$GLB,,, | Performed by: ORTHOPAEDIC SURGERY

## 2021-12-08 PROCEDURE — 99999 PR PBB SHADOW E&M-EST. PATIENT-LVL IV: CPT | Mod: PBBFAC,,, | Performed by: ORTHOPAEDIC SURGERY

## 2022-01-03 DIAGNOSIS — R06.02 SHORTNESS OF BREATH: Primary | ICD-10-CM

## 2022-01-14 NOTE — PROGRESS NOTES
Patient with right shoulder pain.  She has a future appointment with Dr. Jhon Baron who she sees but is here today with her .     I examined her left shoulder and she does have some signs of rotator cuff impingement but but no significant weakness   Or instability.  X-rays show some mild arthritis with inferior osteophytosis at the humeral head.  She will continue activities as tolerated until her follow-up with Dr. Baron.  No charge for this visit.

## 2022-02-23 DIAGNOSIS — D84.9 IMMUNOSUPPRESSED STATUS: ICD-10-CM

## 2022-03-22 DIAGNOSIS — Z00.00 ROUTINE GENERAL MEDICAL EXAMINATION AT A HEALTH CARE FACILITY: Primary | ICD-10-CM

## 2022-04-07 ENCOUNTER — HOSPITAL ENCOUNTER (OUTPATIENT)
Dept: CARDIOLOGY | Facility: CLINIC | Age: 62
Discharge: HOME | End: 2022-04-07
Payer: COMMERCIAL

## 2022-04-07 ENCOUNTER — OFFICE VISIT (OUTPATIENT)
Dept: PULMONOLOGY | Facility: CLINIC | Age: 62
End: 2022-04-07
Payer: COMMERCIAL

## 2022-04-07 ENCOUNTER — CLINICAL SUPPORT (OUTPATIENT)
Dept: INTERNAL MEDICINE | Facility: CLINIC | Age: 62
End: 2022-04-07
Payer: COMMERCIAL

## 2022-04-07 VITALS
HEIGHT: 68 IN | WEIGHT: 142 LBS | BODY MASS INDEX: 21.52 KG/M2 | HEART RATE: 73 BPM | DIASTOLIC BLOOD PRESSURE: 65 MMHG | SYSTOLIC BLOOD PRESSURE: 102 MMHG

## 2022-04-07 DIAGNOSIS — Z00.00 ANNUAL PHYSICAL EXAM: Primary | ICD-10-CM

## 2022-04-07 DIAGNOSIS — K22.89 DILATION OF ESOPHAGUS: Primary | ICD-10-CM

## 2022-04-07 DIAGNOSIS — Z00.00 ROUTINE GENERAL MEDICAL EXAMINATION AT A HEALTH CARE FACILITY: ICD-10-CM

## 2022-04-07 LAB
ALBUMIN SERPL BCP-MCNC: 3.9 G/DL (ref 3.5–5.2)
ALP SERPL-CCNC: 69 U/L (ref 55–135)
ALT SERPL W/O P-5'-P-CCNC: 10 U/L (ref 10–44)
ANION GAP SERPL CALC-SCNC: 8 MMOL/L (ref 8–16)
AST SERPL-CCNC: 19 U/L (ref 10–40)
BILIRUB SERPL-MCNC: 1.2 MG/DL (ref 0.1–1)
BUN SERPL-MCNC: 13 MG/DL (ref 8–23)
CALCIUM SERPL-MCNC: 9.8 MG/DL (ref 8.7–10.5)
CHLORIDE SERPL-SCNC: 104 MMOL/L (ref 95–110)
CHOLEST SERPL-MCNC: 176 MG/DL (ref 120–199)
CHOLEST/HDLC SERPL: 3.1 {RATIO} (ref 2–5)
CO2 SERPL-SCNC: 29 MMOL/L (ref 23–29)
CREAT SERPL-MCNC: 0.7 MG/DL (ref 0.5–1.4)
ERYTHROCYTE [DISTWIDTH] IN BLOOD BY AUTOMATED COUNT: 12.3 % (ref 11.5–14.5)
EST. GFR  (AFRICAN AMERICAN): >60 ML/MIN/1.73 M^2
EST. GFR  (NON AFRICAN AMERICAN): >60 ML/MIN/1.73 M^2
ESTIMATED AVG GLUCOSE: 91 MG/DL (ref 68–131)
GLUCOSE SERPL-MCNC: 86 MG/DL (ref 70–110)
HBA1C MFR BLD: 4.8 % (ref 4–5.6)
HCT VFR BLD AUTO: 40.5 % (ref 37–48.5)
HDLC SERPL-MCNC: 56 MG/DL (ref 40–75)
HDLC SERPL: 31.8 % (ref 20–50)
HGB BLD-MCNC: 13.4 G/DL (ref 12–16)
LDLC SERPL CALC-MCNC: 105.8 MG/DL (ref 63–159)
MCH RBC QN AUTO: 36.3 PG (ref 27–31)
MCHC RBC AUTO-ENTMCNC: 33.1 G/DL (ref 32–36)
MCV RBC AUTO: 110 FL (ref 82–98)
NONHDLC SERPL-MCNC: 120 MG/DL
PLATELET # BLD AUTO: 169 K/UL (ref 150–450)
PMV BLD AUTO: 9.9 FL (ref 9.2–12.9)
POTASSIUM SERPL-SCNC: 4 MMOL/L (ref 3.5–5.1)
PROT SERPL-MCNC: 6.3 G/DL (ref 6–8.4)
RBC # BLD AUTO: 3.69 M/UL (ref 4–5.4)
SODIUM SERPL-SCNC: 141 MMOL/L (ref 136–145)
T4 FREE SERPL-MCNC: 1.32 NG/DL (ref 0.71–1.51)
TRIGL SERPL-MCNC: 71 MG/DL (ref 30–150)
TSH SERPL DL<=0.005 MIU/L-ACNC: 0.02 UIU/ML (ref 0.4–4)
WBC # BLD AUTO: 2.68 K/UL (ref 3.9–12.7)

## 2022-04-07 PROCEDURE — 3074F PR MOST RECENT SYSTOLIC BLOOD PRESSURE < 130 MM HG: ICD-10-PCS | Mod: CPTII,S$GLB,, | Performed by: INTERNAL MEDICINE

## 2022-04-07 PROCEDURE — 99999 PR PBB SHADOW E&M-EST. PATIENT-LVL III: CPT | Mod: PBBFAC,,, | Performed by: INTERNAL MEDICINE

## 2022-04-07 PROCEDURE — 99999 PR PBB SHADOW E&M-EST. PATIENT-LVL III: ICD-10-PCS | Mod: PBBFAC,,, | Performed by: INTERNAL MEDICINE

## 2022-04-07 PROCEDURE — 93010 EKG 12-LEAD: ICD-10-PCS | Mod: S$GLB,,, | Performed by: INTERNAL MEDICINE

## 2022-04-07 PROCEDURE — 3044F PR MOST RECENT HEMOGLOBIN A1C LEVEL <7.0%: ICD-10-PCS | Mod: CPTII,S$GLB,, | Performed by: INTERNAL MEDICINE

## 2022-04-07 PROCEDURE — 3044F HG A1C LEVEL LT 7.0%: CPT | Mod: CPTII,S$GLB,, | Performed by: INTERNAL MEDICINE

## 2022-04-07 PROCEDURE — 93010 ELECTROCARDIOGRAM REPORT: CPT | Mod: S$GLB,,, | Performed by: INTERNAL MEDICINE

## 2022-04-07 PROCEDURE — 3078F PR MOST RECENT DIASTOLIC BLOOD PRESSURE < 80 MM HG: ICD-10-PCS | Mod: CPTII,S$GLB,, | Performed by: INTERNAL MEDICINE

## 2022-04-07 PROCEDURE — 93005 ELECTROCARDIOGRAM TRACING: CPT | Mod: S$GLB,,, | Performed by: INTERNAL MEDICINE

## 2022-04-07 PROCEDURE — 84443 ASSAY THYROID STIM HORMONE: CPT | Performed by: INTERNAL MEDICINE

## 2022-04-07 PROCEDURE — 83036 HEMOGLOBIN GLYCOSYLATED A1C: CPT | Performed by: INTERNAL MEDICINE

## 2022-04-07 PROCEDURE — 99386 PREV VISIT NEW AGE 40-64: CPT | Mod: S$GLB,,, | Performed by: INTERNAL MEDICINE

## 2022-04-07 PROCEDURE — 99999 PR PBB SHADOW E&M-EST. PATIENT-LVL I: CPT | Mod: PBBFAC,,,

## 2022-04-07 PROCEDURE — 99386 PR PREVENTIVE VISIT,NEW,40-64: ICD-10-PCS | Mod: S$GLB,,, | Performed by: INTERNAL MEDICINE

## 2022-04-07 PROCEDURE — 85027 COMPLETE CBC AUTOMATED: CPT | Performed by: INTERNAL MEDICINE

## 2022-04-07 PROCEDURE — 1159F MED LIST DOCD IN RCRD: CPT | Mod: CPTII,S$GLB,, | Performed by: INTERNAL MEDICINE

## 2022-04-07 PROCEDURE — 80053 COMPREHEN METABOLIC PANEL: CPT | Performed by: INTERNAL MEDICINE

## 2022-04-07 PROCEDURE — 3008F PR BODY MASS INDEX (BMI) DOCUMENTED: ICD-10-PCS | Mod: CPTII,S$GLB,, | Performed by: INTERNAL MEDICINE

## 2022-04-07 PROCEDURE — 80061 LIPID PANEL: CPT | Performed by: INTERNAL MEDICINE

## 2022-04-07 PROCEDURE — 84439 ASSAY OF FREE THYROXINE: CPT | Performed by: INTERNAL MEDICINE

## 2022-04-07 PROCEDURE — 3078F DIAST BP <80 MM HG: CPT | Mod: CPTII,S$GLB,, | Performed by: INTERNAL MEDICINE

## 2022-04-07 PROCEDURE — 3008F BODY MASS INDEX DOCD: CPT | Mod: CPTII,S$GLB,, | Performed by: INTERNAL MEDICINE

## 2022-04-07 PROCEDURE — 3074F SYST BP LT 130 MM HG: CPT | Mod: CPTII,S$GLB,, | Performed by: INTERNAL MEDICINE

## 2022-04-07 PROCEDURE — 93005 EKG 12-LEAD: ICD-10-PCS | Mod: S$GLB,,, | Performed by: INTERNAL MEDICINE

## 2022-04-07 PROCEDURE — 1159F PR MEDICATION LIST DOCUMENTED IN MEDICAL RECORD: ICD-10-PCS | Mod: CPTII,S$GLB,, | Performed by: INTERNAL MEDICINE

## 2022-04-07 PROCEDURE — 99999 PR PBB SHADOW E&M-EST. PATIENT-LVL I: ICD-10-PCS | Mod: PBBFAC,,,

## 2022-04-07 RX ORDER — OXYCODONE HYDROCHLORIDE 5 MG/1
5 TABLET ORAL EVERY 6 HOURS PRN
COMMUNITY
Start: 2022-02-27 | End: 2022-04-07

## 2022-04-07 RX ORDER — GABAPENTIN 300 MG/1
300 CAPSULE ORAL NIGHTLY
COMMUNITY
Start: 2022-02-27

## 2022-04-07 RX ORDER — HYDROCODONE BITARTRATE AND ACETAMINOPHEN 5; 325 MG/1; MG/1
1 TABLET ORAL 3 TIMES DAILY PRN
COMMUNITY
Start: 2021-12-01 | End: 2023-02-01

## 2022-04-07 RX ORDER — TRAZODONE HYDROCHLORIDE 100 MG/1
100 TABLET ORAL NIGHTLY
COMMUNITY
Start: 2022-01-13

## 2022-04-07 RX ORDER — SUMATRIPTAN SUCCINATE 100 MG/1
100 TABLET ORAL DAILY PRN
COMMUNITY
Start: 2022-03-21

## 2022-04-07 NOTE — PROGRESS NOTES
Subjective:       Patient ID: Nuris Cm is a 61 y.o. female.    Chief Complaint: Annual Exam    HPI   60 yo female comes for her periodic health exam. She is recovering from rotator cuff surgery of her right shoulder. She fell off a ladder in her closet and injured her shoulder. She is 5 weeks post op and is making progress.Still in a sling and rarely using pain meds.     Has experience several episodes of lower esophageal dypsagia for rice and bread. Will arrange for a EGD and possible dilatation.    S/P Breast cancer with reconstructive surgery and doing well.    Has low grade auto immune hepatitis and goes to [place] annually for follow up.  Review of Systems   Constitutional: Negative.    HENT: Negative.    Eyes: Negative.    Respiratory: Negative.    Cardiovascular: Negative.    Gastrointestinal: Negative.         Long hx of auto immune hepatitis.    Recently has had several bouts of lower esophageal dysphagia  For rice and bread  Will arrange for an EGD   Endocrine:        Thyroid replacement after Hashmimoto's Thyroiditis   Genitourinary: Negative.           S/P Breast cancer with reconstructive surgery   Musculoskeletal: Negative.         Recuperating from right rotator cuff surgery   Integumentary:  Negative.   Neurological: Negative.    Psychiatric/Behavioral: Negative.    All other systems reviewed and are negative.        Objective:      Physical Exam  Vitals and nursing note reviewed.   Constitutional:       General: She is not in acute distress.     Appearance: She is well-developed.   HENT:      Head: Normocephalic and atraumatic.      Right Ear: External ear normal.      Left Ear: External ear normal.      Nose: Nose normal.   Eyes:      Conjunctiva/sclera: Conjunctivae normal.      Pupils: Pupils are equal, round, and reactive to light.   Neck:      Thyroid: No thyromegaly.      Vascular: No JVD.   Cardiovascular:      Rate and Rhythm: Normal rate and regular rhythm.      Heart sounds: Normal  heart sounds. No murmur heard.    No gallop.   Pulmonary:      Effort: No respiratory distress.      Breath sounds: Normal breath sounds. No stridor. No wheezing or rales.   Chest:      Chest wall: No tenderness.   Abdominal:      General: Bowel sounds are normal. There is no distension.      Palpations: Abdomen is soft. There is no mass.      Tenderness: There is no abdominal tenderness. There is no guarding or rebound.   Musculoskeletal:         General: Normal range of motion.      Cervical back: Normal range of motion and neck supple.      Comments: R arm in a sling.   Lymphadenopathy:      Cervical: No cervical adenopathy.   Skin:     General: Skin is warm and dry.      Findings: No rash.   Neurological:      Mental Status: She is alert and oriented to person, place, and time.      Cranial Nerves: No cranial nerve deficit.      Deep Tendon Reflexes: Reflexes are normal and symmetric. Reflexes normal.   Psychiatric:         Behavior: Behavior normal.         Thought Content: Thought content normal.         Judgment: Judgment normal.         Assessment:       Problem List Items Addressed This Visit    None     Visit Diagnoses     Annual physical exam    -  Primary          Plan:         Labs:All parameters are normal. She is on  Synthroid and TSH is appropriately low with normal free T4.    EKG: Normal    IMP: Recuperating from recent rotator cuff surgery.

## 2022-04-07 NOTE — LETTER
April 7, 2022    Nuris Cm  [address] [place] [id number]             [facility] [address]  [address]  [facility] [address]  Phone: [phone] Dear         Thank you for allowing me to serve you and perform your [facility] exam on 4/7/2022. This letter will serve as a brief summary of the physical findings and laboratory/studies performed and recommendations at this time.  Except for your sling, this is a normal exam. Be patient because it is a long recovery.        If you have any questions or concerns, please don't hesitate to call.    Sincerely,        Perry Kiser MD

## 2022-04-07 NOTE — H&P (VIEW-ONLY)
Subjective:       Patient ID: Nuris Cm is a 61 y.o. female.    Chief Complaint: Annual Exam    HPI   62 yo female comes for her periodic health exam. She is recovering from rotator cuff surgery of her right shoulder. She fell off a ladder in her closet and injured her shoulder. She is 5 weeks post op and is making progress.Still in a sling and rarely using pain meds.     Has experience several episodes of lower esophageal dypsagia for rice and bread. Will arrange for a EGD and possible dilatation.    S/P Breast cancer with reconstructive surgery and doing well.    Has low grade auto immune hepatitis and goes to [place] annually for follow up.  Review of Systems   Constitutional: Negative.    HENT: Negative.    Eyes: Negative.    Respiratory: Negative.    Cardiovascular: Negative.    Gastrointestinal: Negative.         Long hx of auto immune hepatitis.    Recently has had several bouts of lower esophageal dysphagia  For rice and bread  Will arrange for an EGD   Endocrine:        Thyroid replacement after Hashmimoto's Thyroiditis   Genitourinary: Negative.           S/P Breast cancer with reconstructive surgery   Musculoskeletal: Negative.         Recuperating from right rotator cuff surgery   Integumentary:  Negative.   Neurological: Negative.    Psychiatric/Behavioral: Negative.    All other systems reviewed and are negative.        Objective:      Physical Exam  Vitals and nursing note reviewed.   Constitutional:       General: She is not in acute distress.     Appearance: She is well-developed.   HENT:      Head: Normocephalic and atraumatic.      Right Ear: External ear normal.      Left Ear: External ear normal.      Nose: Nose normal.   Eyes:      Conjunctiva/sclera: Conjunctivae normal.      Pupils: Pupils are equal, round, and reactive to light.   Neck:      Thyroid: No thyromegaly.      Vascular: No JVD.   Cardiovascular:      Rate and Rhythm: Normal rate and regular rhythm.      Heart sounds: Normal  heart sounds. No murmur heard.    No gallop.   Pulmonary:      Effort: No respiratory distress.      Breath sounds: Normal breath sounds. No stridor. No wheezing or rales.   Chest:      Chest wall: No tenderness.   Abdominal:      General: Bowel sounds are normal. There is no distension.      Palpations: Abdomen is soft. There is no mass.      Tenderness: There is no abdominal tenderness. There is no guarding or rebound.   Musculoskeletal:         General: Normal range of motion.      Cervical back: Normal range of motion and neck supple.      Comments: R arm in a sling.   Lymphadenopathy:      Cervical: No cervical adenopathy.   Skin:     General: Skin is warm and dry.      Findings: No rash.   Neurological:      Mental Status: She is alert and oriented to person, place, and time.      Cranial Nerves: No cranial nerve deficit.      Deep Tendon Reflexes: Reflexes are normal and symmetric. Reflexes normal.   Psychiatric:         Behavior: Behavior normal.         Thought Content: Thought content normal.         Judgment: Judgment normal.         Assessment:       Problem List Items Addressed This Visit    None     Visit Diagnoses     Annual physical exam    -  Primary          Plan:         Labs:All parameters are normal. She is on  Synthroid and TSH is appropriately low with normal free T4.    EKG: Normal    IMP: Recuperating from recent rotator cuff surgery.

## 2022-04-08 ENCOUNTER — PATIENT MESSAGE (OUTPATIENT)
Dept: ENDOCRINOLOGY | Facility: CLINIC | Age: 62
End: 2022-04-08
Payer: COMMERCIAL

## 2022-04-08 DIAGNOSIS — B00.9 HERPES: ICD-10-CM

## 2022-04-11 RX ORDER — VALACYCLOVIR HYDROCHLORIDE 500 MG/1
500 TABLET, FILM COATED ORAL DAILY
Qty: 90 TABLET | Refills: 3 | Status: SHIPPED | OUTPATIENT
Start: 2022-04-11 | End: 2022-09-18

## 2022-04-14 ENCOUNTER — PATIENT MESSAGE (OUTPATIENT)
Dept: PULMONOLOGY | Facility: CLINIC | Age: 62
End: 2022-04-14
Payer: COMMERCIAL

## 2022-04-18 ENCOUNTER — TELEPHONE (OUTPATIENT)
Dept: PULMONOLOGY | Facility: CLINIC | Age: 62
End: 2022-04-18
Payer: COMMERCIAL

## 2022-04-18 ENCOUNTER — PATIENT MESSAGE (OUTPATIENT)
Dept: ENDOSCOPY | Facility: HOSPITAL | Age: 62
End: 2022-04-18
Payer: COMMERCIAL

## 2022-04-19 ENCOUNTER — HOSPITAL ENCOUNTER (OUTPATIENT)
Facility: HOSPITAL | Age: 62
Discharge: HOME | End: 2022-04-19
Attending: INTERNAL MEDICINE | Admitting: INTERNAL MEDICINE
Payer: COMMERCIAL

## 2022-04-19 ENCOUNTER — ANESTHESIA (OUTPATIENT)
Dept: ENDOSCOPY | Facility: HOSPITAL | Age: 62
End: 2022-04-19
Payer: COMMERCIAL

## 2022-04-19 ENCOUNTER — ANESTHESIA EVENT (OUTPATIENT)
Dept: ENDOSCOPY | Facility: HOSPITAL | Age: 62
End: 2022-04-19
Payer: COMMERCIAL

## 2022-04-19 VITALS
RESPIRATION RATE: 16 BRPM | HEIGHT: 69 IN | BODY MASS INDEX: 21.18 KG/M2 | OXYGEN SATURATION: 97 % | SYSTOLIC BLOOD PRESSURE: 145 MMHG | DIASTOLIC BLOOD PRESSURE: 67 MMHG | WEIGHT: 143 LBS | HEART RATE: 72 BPM | TEMPERATURE: 97 F

## 2022-04-19 DIAGNOSIS — R13.10 DYSPHAGIA: ICD-10-CM

## 2022-04-19 DIAGNOSIS — R13.10 DYSPHAGIA, UNSPECIFIED TYPE: Primary | ICD-10-CM

## 2022-04-19 PROCEDURE — 88305 TISSUE EXAM BY PATHOLOGIST: CPT | Performed by: PATHOLOGY

## 2022-04-19 PROCEDURE — C1726 CATH, BAL DIL, NON-VASCULAR: HCPCS | Performed by: INTERNAL MEDICINE

## 2022-04-19 PROCEDURE — E9220 PRA ENDO ANESTHESIA: HCPCS | Mod: ,,, | Performed by: STUDENT IN AN ORGANIZED HEALTH CARE EDUCATION/TRAINING PROGRAM

## 2022-04-19 PROCEDURE — 37000008 HC ANESTHESIA 1ST 15 MINUTES: Performed by: INTERNAL MEDICINE

## 2022-04-19 PROCEDURE — 88305 TISSUE EXAM BY PATHOLOGIST: CPT | Mod: 26,,, | Performed by: PATHOLOGY

## 2022-04-19 PROCEDURE — 37000009 HC ANESTHESIA EA ADD 15 MINS: Performed by: INTERNAL MEDICINE

## 2022-04-19 PROCEDURE — 43239 PR EGD, FLEX, W/BIOPSY, SGL/MULTI: ICD-10-PCS | Mod: 59,,, | Performed by: INTERNAL MEDICINE

## 2022-04-19 PROCEDURE — 25000003 PHARM REV CODE 250: Performed by: INTERNAL MEDICINE

## 2022-04-19 PROCEDURE — 63600175 PHARM REV CODE 636 W HCPCS: Performed by: STUDENT IN AN ORGANIZED HEALTH CARE EDUCATION/TRAINING PROGRAM

## 2022-04-19 PROCEDURE — 43239 EGD BIOPSY SINGLE/MULTIPLE: CPT | Performed by: INTERNAL MEDICINE

## 2022-04-19 PROCEDURE — 43239 EGD BIOPSY SINGLE/MULTIPLE: CPT | Mod: 59,,, | Performed by: INTERNAL MEDICINE

## 2022-04-19 PROCEDURE — 43249 ESOPH EGD DILATION <30 MM: CPT | Mod: ,,, | Performed by: INTERNAL MEDICINE

## 2022-04-19 PROCEDURE — 88305 TISSUE EXAM BY PATHOLOGIST: ICD-10-PCS | Mod: 26,,, | Performed by: PATHOLOGY

## 2022-04-19 PROCEDURE — E9220 PRA ENDO ANESTHESIA: ICD-10-PCS | Mod: ,,, | Performed by: STUDENT IN AN ORGANIZED HEALTH CARE EDUCATION/TRAINING PROGRAM

## 2022-04-19 PROCEDURE — 43249 PR EGD, FLEX, W/BALL DILATION, < 30MM: ICD-10-PCS | Mod: ,,, | Performed by: INTERNAL MEDICINE

## 2022-04-19 PROCEDURE — 27201012 HC FORCEPS, HOT/COLD, DISP: Performed by: INTERNAL MEDICINE

## 2022-04-19 PROCEDURE — 25000003 PHARM REV CODE 250: Performed by: STUDENT IN AN ORGANIZED HEALTH CARE EDUCATION/TRAINING PROGRAM

## 2022-04-19 RX ORDER — SODIUM CHLORIDE 0.9 % (FLUSH) 0.9 %
10 SYRINGE (ML) INJECTION
Status: DISCONTINUED | OUTPATIENT
Start: 2022-04-19 | End: 2022-04-19 | Stop reason: HOSPADM

## 2022-04-19 RX ORDER — SODIUM CHLORIDE 9 MG/ML
INJECTION, SOLUTION INTRAVENOUS CONTINUOUS
Status: DISCONTINUED | OUTPATIENT
Start: 2022-04-19 | End: 2022-04-19 | Stop reason: HOSPADM

## 2022-04-19 RX ORDER — PROPOFOL 10 MG/ML
VIAL (ML) INTRAVENOUS CONTINUOUS PRN
Status: DISCONTINUED | OUTPATIENT
Start: 2022-04-19 | End: 2022-04-19

## 2022-04-19 RX ORDER — PROPOFOL 10 MG/ML
VIAL (ML) INTRAVENOUS
Status: DISCONTINUED | OUTPATIENT
Start: 2022-04-19 | End: 2022-04-19

## 2022-04-19 RX ORDER — LIDOCAINE HCL/PF 100 MG/5ML
SYRINGE (ML) INTRAVENOUS
Status: DISCONTINUED | OUTPATIENT
Start: 2022-04-19 | End: 2022-04-19

## 2022-04-19 RX ADMIN — PROPOFOL 200 MCG/KG/MIN: 10 INJECTION, EMULSION INTRAVENOUS at 01:04

## 2022-04-19 RX ADMIN — PROPOFOL 20 MG: 10 INJECTION, EMULSION INTRAVENOUS at 01:04

## 2022-04-19 RX ADMIN — PROPOFOL 70 MG: 10 INJECTION, EMULSION INTRAVENOUS at 01:04

## 2022-04-19 RX ADMIN — Medication 100 MG: at 01:04

## 2022-04-19 RX ADMIN — GLYCOPYRROLATE 0.2 MG: 0.2 INJECTION, SOLUTION INTRAMUSCULAR; INTRAVITREAL at 01:04

## 2022-04-19 RX ADMIN — SODIUM CHLORIDE: 0.9 INJECTION, SOLUTION INTRAVENOUS at 01:04

## 2022-04-19 NOTE — ANESTHESIA PREPROCEDURE EVALUATION
04/19/2022  Nuris Cm is a 61 y.o., female.  Past Medical History:   Diagnosis Date    Arthritis     Asthma     Breast cancer 2008    double mastecomy    Depression     Encounter for blood transfusion     Hepatitis 1998    autoimmune    Thyroid disease      Past Surgical History:   Procedure Laterality Date    BREAST RECONSTRUCTION  2010    COSMETIC SURGERY      HYSTERECTOMY  2010    masectomy  2008    TONSILLECTOMY             Pre-op Assessment    I have reviewed the Patient Summary Reports.    I have reviewed the NPO Status.   I have reviewed the Medications.     Review of Systems  Anesthesia Hx:  No problems with previous Anesthesia Denies Hx of Anesthetic complications  Neg history of prior surgery. Denies Family Hx of Anesthesia complications.   Denies Personal Hx of Anesthesia complications.   Social:  Social Alcohol Use, Non-Smoker    Hematology/Oncology:  Hematology Normal       -- Cancer in past history:  Breast right axillary node dissection no lymphedema surgery    EENT/Dental:EENT/Dental Normal   Cardiovascular:  Cardiovascular Normal Exercise tolerance: good     Pulmonary:   Asthma asymptomatic    Renal/:  Renal/ Normal     Hepatic/GI:   Bowel Prep. Liver Disease, Hepatitis Autoimmune hepatitis   Musculoskeletal:  Musculoskeletal Normal    Neurological:  Neurology Normal    Endocrine:   Diabetes, type 2 Hypothyroidism    Dermatological:  Skin Normal    Psych:   depression          Physical Exam  General: Well nourished, Cooperative, Alert and Oriented    Airway:  Mallampati: II / I  Mouth Opening: Normal  TM Distance: Normal  Tongue: Normal  Neck ROM: Normal ROM    Dental:  Intact        Anesthesia Plan  Type of Anesthesia, risks & benefits discussed:    Anesthesia Type: Gen Natural Airway  Intra-op Monitoring Plan: Standard ASA Monitors  Post Op Pain Control Plan: multimodal  analgesia  Induction:  IV  Informed Consent: Informed consent signed with the Patient and all parties understand the risks and agree with anesthesia plan.  All questions answered. Patient consented to blood products? No  ASA Score: 2  Day of Surgery Review of History & Physical: H&P Update referred to the surgeon/provider.    Ready For Surgery From Anesthesia Perspective.     .

## 2022-04-19 NOTE — TRANSFER OF CARE
"Anesthesia Transfer of Care Note    Patient: Nuris Cm    Procedure(s) Performed: Procedure(s) (LRB):  ESOPHAGOGASTRODUODENOSCOPY (EGD) (N/A)    Patient location: GI    Anesthesia Type: general    Transport from OR: Transported from OR on room air with adequate spontaneous ventilation    Post pain: adequate analgesia    Post assessment: no apparent anesthetic complications and tolerated procedure well    Post vital signs: stable    Level of consciousness: sedated and responds to stimulation    Nausea/Vomiting: no nausea/vomiting    Complications: none    Transfer of care protocol was followed      Last vitals:   Visit Vitals  /67 (BP Location: Left arm, Patient Position: Lying)   Pulse 73   Temp 36.1 °C (97 °F) (Temporal)   Resp 17   Ht 5' 8.5" (1.74 m)   Wt 64.9 kg (143 lb)   SpO2 99%   Breastfeeding No   BMI 21.43 kg/m²     "

## 2022-04-19 NOTE — PROVATION PATIENT INSTRUCTIONS
Discharge Summary/Instructions after an Endoscopic Procedure  Patient Name: Nuris Cm  Patient MRN: [id number]  Patient YOB: 1960  Tuesday, April 19, 2022  Alexa Odell MD  Dear patient,  As a result of recent federal legislation (The Federal Cures Act), you may   receive lab or pathology results from your procedure in your [facility]   account before your physician is able to contact you. Your physician or   their representative will relay the results to you with their   recommendations at their soonest availability.  Thank you,  RESTRICTIONS:  During your procedure today, you received medications for sedation.  These   medications may affect your judgment, balance and coordination.  Therefore,   for 24 hours, you have the following restrictions:   - DO NOT drive a car, operate machinery, make legal/financial decisions,   sign important papers or drink alcohol.    ACTIVITY:  Today: no heavy lifting, straining or running due to procedural   sedation/anesthesia.  The following day: return to full activity including work.  DIET:  Eat and drink normally unless instructed otherwise.     TREATMENT FOR COMMON SIDE EFFECTS:  - Mild abdominal pain, nausea, belching, bloating or excessive gas:  rest,   eat lightly and use a heating pad.  - Sore Throat: treat with throat lozenges and/or gargle with warm salt   water.  - Because air was used during the procedure, expelling large amounts of air   from your rectum or belching is normal.  - If a bowel prep was taken, you may not have a bowel movement for 1-3 days.    This is normal.  SYMPTOMS TO WATCH FOR AND REPORT TO YOUR PHYSICIAN:  1. Abdominal pain or bloating, other than gas cramps.  2. Chest pain.  3. Back pain.  4. Signs of infection such as: chills or fever occurring within 24 hours   after the procedure.  5. Rectal bleeding, which would show as bright red, maroon, or black stools.   (A tablespoon of blood from the rectum is not serious, especially if    hemorrhoids are present.)  6. Vomiting.  7. Weakness or dizziness.  GO DIRECTLY TO THE NEAREST EMERGENCY ROOM IF YOU HAVE ANY OF THE FOLLOWING:      Difficulty breathing              Chills and/or fever over 101 F   Persistent vomiting and/or vomiting blood   Severe abdominal pain   Severe chest pain   Black, tarry stools   Bleeding- more than one tablespoon   Any other symptom or condition that you feel may need urgent attention  Your doctor recommends these additional instructions:  If any biopsies were taken, your doctors clinic will contact you in 1 to 2   weeks with any results.  - Discharge patient to home (with escort).   - Resume previous diet.   - Continue present medications.   - Await pathology results.   - The findings and recommendations were discussed with the patient.   - Patient has a contact number available for emergencies.  The signs and   symptoms of potential delayed complications were discussed with the   patient.  Return to normal activities tomorrow.  Written discharge   instructions were provided to the patient.  For questions, problems or results please call your physician - Alexa Odell MD at Work:  [phone].  [facility], EMERGENCY ROOM PHONE NUMBER: [phone]  IF A COMPLICATION OR EMERGENCY SITUATION ARISES AND YOU ARE UNABLE TO REACH   YOUR PHYSICIAN - GO DIRECTLY TO THE EMERGENCY ROOM.  Alexa Odell MD  4/19/2022 1:34:43 PM  This report has been verified and signed electronically.  Dear patient,  As a result of recent federal legislation (The Federal Cures Act), you may   receive lab or pathology results from your procedure in your [facility]   account before your physician is able to contact you. Your physician or   their representative will relay the results to you with their   recommendations at their soonest availability.  Thank you,  PROVATION

## 2022-04-19 NOTE — INTERVAL H&P NOTE
The patient has been examined and the H&P has been reviewed:    I concur with the findings and no changes have occurred since H&P was written.    Anesthesia risks, benefits and alternative options discussed and understood by patient/family.      Pre-Procedure H and P Addendum    Patient seen and examined.  History and exam unchanged from prior history and physical.      Procedure: EGD   Indication: dysphagia  ASA Class: per anesthesiology  Airway: per anesthesia  Neck Mobility: full range of motion  Mallampatti score: per anesthesia  History of anesthesia problems: no  Family history of anesthesia problems: no  Anesthesia Plan: per anesthesia      There are no hospital problems to display for this patient.

## 2022-04-20 ENCOUNTER — TELEPHONE (OUTPATIENT)
Dept: PULMONOLOGY | Facility: CLINIC | Age: 62
End: 2022-04-20
Payer: COMMERCIAL

## 2022-04-25 NOTE — ANESTHESIA RELEASE NOTE
"Anesthesia Release from PACU Note    Patient: Nuris Cm    Procedure(s) Performed: Procedure(s) (LRB):  ESOPHAGOGASTRODUODENOSCOPY (EGD) (N/A)    Anesthesia type: general    Post pain: Adequate analgesia    Post assessment: no apparent anesthetic complications and tolerated procedure well    Last Vitals:   Visit Vitals  BP (!) 145/67 (BP Location: Right leg, Patient Position: Lying)   Pulse 72   Temp 36.2 °C (97.2 °F)   Resp 16   Ht 5' 8.5" (1.74 m)   Wt 64.9 kg (143 lb)   SpO2 97%   Breastfeeding No   BMI 21.43 kg/m²       Post vital signs: stable    Level of consciousness: awake and alert     Nausea/Vomiting: no nausea/no vomiting    Complications: none    Airway Patency: patent    Respiratory: unassisted, spontaneous ventilation, room air    Cardiovascular: stable and blood pressure at baseline    Hydration: euvolemic  "

## 2022-04-26 LAB
FINAL PATHOLOGIC DIAGNOSIS: NORMAL
Lab: NORMAL

## 2022-07-19 DIAGNOSIS — E06.3 HYPOTHYROIDISM DUE TO HASHIMOTO'S THYROIDITIS: Primary | ICD-10-CM

## 2022-07-19 DIAGNOSIS — E03.8 HYPOTHYROIDISM DUE TO HASHIMOTO'S THYROIDITIS: Primary | ICD-10-CM

## 2022-07-19 RX ORDER — LEVOTHYROXINE SODIUM 112 UG/1
112 TABLET ORAL
Qty: 30 TABLET | Refills: 3 | Status: SHIPPED | OUTPATIENT
Start: 2022-07-19 | End: 2022-10-07

## 2022-09-05 ENCOUNTER — OFFICE VISIT (OUTPATIENT)
Dept: URGENT CARE | Facility: CLINIC | Age: 62
End: 2022-09-05
Payer: COMMERCIAL

## 2022-09-05 VITALS
WEIGHT: 137.81 LBS | DIASTOLIC BLOOD PRESSURE: 69 MMHG | BODY MASS INDEX: 20.89 KG/M2 | TEMPERATURE: 97 F | OXYGEN SATURATION: 98 % | SYSTOLIC BLOOD PRESSURE: 126 MMHG | RESPIRATION RATE: 18 BRPM | HEART RATE: 58 BPM | HEIGHT: 68 IN

## 2022-09-05 DIAGNOSIS — N30.01 ACUTE CYSTITIS WITH HEMATURIA: Primary | ICD-10-CM

## 2022-09-05 DIAGNOSIS — R30.0 DYSURIA: ICD-10-CM

## 2022-09-05 DIAGNOSIS — R10.2 SUPRAPUBIC PRESSURE: ICD-10-CM

## 2022-09-05 LAB
BILIRUB UR QL STRIP: NEGATIVE
GLUCOSE UR QL STRIP: NEGATIVE
KETONES UR QL STRIP: NEGATIVE
LEUKOCYTE ESTERASE UR QL STRIP: NEGATIVE
PH, POC UA: 5 (ref 5–8)
POC BLOOD, URINE: POSITIVE
POC NITRATES, URINE: NEGATIVE
PROT UR QL STRIP: NEGATIVE
SP GR UR STRIP: 1.02 (ref 1–1.03)
UROBILINOGEN UR STRIP-ACNC: NORMAL (ref 0.1–1.1)

## 2022-09-05 PROCEDURE — 3074F PR MOST RECENT SYSTOLIC BLOOD PRESSURE < 130 MM HG: ICD-10-PCS | Mod: CPTII,S$GLB,, | Performed by: PHYSICIAN ASSISTANT

## 2022-09-05 PROCEDURE — 3008F BODY MASS INDEX DOCD: CPT | Mod: CPTII,S$GLB,, | Performed by: PHYSICIAN ASSISTANT

## 2022-09-05 PROCEDURE — 1160F PR REVIEW ALL MEDS BY PRESCRIBER/CLIN PHARMACIST DOCUMENTED: ICD-10-PCS | Mod: CPTII,S$GLB,, | Performed by: PHYSICIAN ASSISTANT

## 2022-09-05 PROCEDURE — 3078F PR MOST RECENT DIASTOLIC BLOOD PRESSURE < 80 MM HG: ICD-10-PCS | Mod: CPTII,S$GLB,, | Performed by: PHYSICIAN ASSISTANT

## 2022-09-05 PROCEDURE — 3044F PR MOST RECENT HEMOGLOBIN A1C LEVEL <7.0%: ICD-10-PCS | Mod: CPTII,S$GLB,, | Performed by: PHYSICIAN ASSISTANT

## 2022-09-05 PROCEDURE — 1159F MED LIST DOCD IN RCRD: CPT | Mod: CPTII,S$GLB,, | Performed by: PHYSICIAN ASSISTANT

## 2022-09-05 PROCEDURE — 99214 OFFICE O/P EST MOD 30 MIN: CPT | Mod: S$GLB,,, | Performed by: PHYSICIAN ASSISTANT

## 2022-09-05 PROCEDURE — 81003 URINALYSIS AUTO W/O SCOPE: CPT | Mod: QW,S$GLB,, | Performed by: PHYSICIAN ASSISTANT

## 2022-09-05 PROCEDURE — 3074F SYST BP LT 130 MM HG: CPT | Mod: CPTII,S$GLB,, | Performed by: PHYSICIAN ASSISTANT

## 2022-09-05 PROCEDURE — 3008F PR BODY MASS INDEX (BMI) DOCUMENTED: ICD-10-PCS | Mod: CPTII,S$GLB,, | Performed by: PHYSICIAN ASSISTANT

## 2022-09-05 PROCEDURE — 1160F RVW MEDS BY RX/DR IN RCRD: CPT | Mod: CPTII,S$GLB,, | Performed by: PHYSICIAN ASSISTANT

## 2022-09-05 PROCEDURE — 81003 POCT URINALYSIS, DIPSTICK, AUTOMATED, W/O SCOPE: ICD-10-PCS | Mod: QW,S$GLB,, | Performed by: PHYSICIAN ASSISTANT

## 2022-09-05 PROCEDURE — 1159F PR MEDICATION LIST DOCUMENTED IN MEDICAL RECORD: ICD-10-PCS | Mod: CPTII,S$GLB,, | Performed by: PHYSICIAN ASSISTANT

## 2022-09-05 PROCEDURE — 3078F DIAST BP <80 MM HG: CPT | Mod: CPTII,S$GLB,, | Performed by: PHYSICIAN ASSISTANT

## 2022-09-05 PROCEDURE — 87086 URINE CULTURE/COLONY COUNT: CPT | Performed by: PHYSICIAN ASSISTANT

## 2022-09-05 PROCEDURE — 3044F HG A1C LEVEL LT 7.0%: CPT | Mod: CPTII,S$GLB,, | Performed by: PHYSICIAN ASSISTANT

## 2022-09-05 PROCEDURE — 99214 PR OFFICE/OUTPT VISIT, EST, LEVL IV, 30-39 MIN: ICD-10-PCS | Mod: S$GLB,,, | Performed by: PHYSICIAN ASSISTANT

## 2022-09-05 RX ORDER — PHENAZOPYRIDINE HYDROCHLORIDE 200 MG/1
200 TABLET, FILM COATED ORAL 2 TIMES DAILY PRN
Qty: 6 TABLET | Refills: 0 | Status: SHIPPED | OUTPATIENT
Start: 2022-09-05 | End: 2022-09-08

## 2022-09-05 RX ORDER — SULFAMETHOXAZOLE AND TRIMETHOPRIM 800; 160 MG/1; MG/1
1 TABLET ORAL 2 TIMES DAILY
Qty: 14 TABLET | Refills: 0 | Status: SHIPPED | OUTPATIENT
Start: 2022-09-05 | End: 2022-09-12

## 2022-09-05 NOTE — PROGRESS NOTES
"Subjective:       Patient ID: Nuris Cm is a 61 y.o. female.    Vitals:  height is 5' 8" (1.727 m) and weight is 62.5 kg (137 lb 12.6 oz). Her temperature is 96.7 °F (35.9 °C). Her blood pressure is 126/69 and her pulse is 58 (abnormal). Her respiration is 18 and oxygen saturation is 98%.     Chief Complaint: Dysuria    Pt complains of dysuria, pressure, burning sensation, tingling sensation x 1 week. Pt states there is mild dysuria and pressure with urination, and increased frequency, but also has burning, mild itching, pressure deep inside vaginal area, possibly near bladder. No discharge or spotting. No vaginal swelling, rash, or lesion. Pt states she had UTI many years ago and thinks it felt similar. No fever, vomiting, abdominal pain, or flank pain.     Dysuria   This is a new problem. The current episode started acute onset. The problem occurs every urination. The problem has been unchanged. The quality of the pain is described as aching and burning. The pain is at a severity of 2/10. The patient is experiencing no pain. There has been no fever. Associated symptoms include frequency and urgency. Pertinent negatives include no behavior changes, chills, discharge, flank pain, hematuria, hesitancy, nausea, possible pregnancy, sweats, vomiting, weight loss, bubble bath use, constipation, rash or withholding.     Constitution: Negative for chills, sweating, fatigue and fever.   HENT:  Negative for congestion and sore throat.    Neck: Negative for neck pain and neck stiffness.   Cardiovascular:  Negative for chest pain, leg swelling and palpitations.   Eyes:  Negative for eye itching, eye pain and eye redness.   Respiratory:  Negative for cough and sputum production.    Gastrointestinal:  Negative for nausea, vomiting and constipation.   Genitourinary:  Positive for dysuria, frequency and urgency. Negative for flank pain, hematuria, vaginal pain, vaginal discharge, vaginal bleeding, vaginal odor and genital " sore.   Musculoskeletal:  Negative for pain and joint pain.   Skin:  Negative for color change and rash.   Neurological:  Negative for dizziness, light-headedness, headaches, numbness and tingling.     Objective:      Physical Exam   Constitutional: She is oriented to person, place, and time. She appears well-developed.  Non-toxic appearance. She does not appear ill. No distress.   HENT:   Head: Normocephalic and atraumatic.   Ears:   Right Ear: External ear normal.   Left Ear: External ear normal.   Eyes: Conjunctivae are normal. Right eye exhibits no discharge. Left eye exhibits no discharge. No scleral icterus.   Pulmonary/Chest: Effort normal. No stridor. No respiratory distress. She has no wheezes.   Abdominal: Soft. There is abdominal tenderness in the suprapubic area. There is no guarding, no left CVA tenderness and no right CVA tenderness.      Comments: Mild suprapubic tenderness midline directly over bladder. No cva tenderness. Rest of abdomen non tender. No peritoneal signs. Soft abdomen, no guarding.   Genitourinary:         Comments: Exam deferred     Neurological: She is alert and oriented to person, place, and time.   Skin: Skin is not diaphoretic.   Psychiatric: Her behavior is normal. Judgment and thought content normal.   Nursing note and vitals reviewed.        Results for orders placed or performed in visit on 09/05/22   POCT Urinalysis, Dipstick, Automated, W/O Scope   Result Value Ref Range    POC Blood, Urine Positive (A) Negative    POC Bilirubin, Urine Negative Negative    POC Urobilinogen, Urine normal 0.1 - 1.1    POC Ketones, Urine Negative Negative    POC Protein, Urine Negative Negative    POC Nitrates, Urine Negative Negative    POC Glucose, Urine Negative Negative    pH, UA 5.0 5 - 8    POC Specific Gravity, Urine 1.025 1.003 - 1.029    POC Leukocytes, Urine Negative Negative       Assessment:       1. Acute cystitis with hematuria    2. Dysuria    3. Suprapubic pressure           Plan:       Some symptoms consistent with UTI, pt has difficult time locating exact location of symptoms, possible vaginal symptoms- due to this, recommended vaginosis swab, have considered vaginitis/yeast vaginitis/atrophic vaginitis, though could be from urethritis from UTI, but patient declined vaginosis swab at this time. States she will follow up if UC negative and if symptoms persist after abx. - Discussed ddx, home care, tx options, and given follow up precautions.     Acute cystitis with hematuria  -     POCT Urinalysis, Dipstick, Automated, W/O Scope  -     sulfamethoxazole-trimethoprim 800-160mg (BACTRIM DS) 800-160 mg Tab; Take 1 tablet by mouth 2 (two) times daily. for 7 days  Dispense: 14 tablet; Refill: 0  -     phenazopyridine (PYRIDIUM) 200 MG tablet; Take 1 tablet (200 mg total) by mouth 2 (two) times daily as needed for Pain.  Dispense: 6 tablet; Refill: 0    Dysuria  -     Urine culture    Suprapubic pressure  -     Urine culture       Patient Instructions   - Rest.    - Drink plenty of fluids.    - Acetaminophen (tylenol) or Ibuprofen (advil,motrin) as directed as needed for fever/pain. Avoid tylenol if you have a history of liver disease. Do not take ibuprofen if you have a history of GI bleeding, kidney disease, or if you take blood thinners.     - You have been given an antibiotic to treat your condition today.    - Please complete the antibiotic as directed on the bottle.   - If you are female and on oral birth control pills, use additional methods to prevent pregnancy while on antibiotics and for one cycle after.   - you can take otc probiotic to limit upset stomach    - you can take the prescribed Pyridium as directed for discomfort with urination. This may cause your urine to turn orange/red color.    - Follow up with your PCP or specialty clinic as directed in the next 1-2 weeks if not improved or as needed.  You can call [phone] to schedule an appointment with the appropriate  provider.    - Go to the ER or seek medical attention immediately if you develop new or worsening symptoms.     - You must understand that you have received an Urgent Care treatment only and that you may be released before all of your medical problems are known or treated.   - You, the patient, will arrange for follow up care as instructed.   - If your condition worsens or fails to improve we recommend that you receive another evaluation at the ER immediately or contact your PCP to discuss your concerns or return here.

## 2022-09-05 NOTE — PATIENT INSTRUCTIONS
- Rest.    - Drink plenty of fluids.    - Acetaminophen (tylenol) or Ibuprofen (advil,motrin) as directed as needed for fever/pain. Avoid tylenol if you have a history of liver disease. Do not take ibuprofen if you have a history of GI bleeding, kidney disease, or if you take blood thinners.     - You have been given an antibiotic to treat your condition today.    - Please complete the antibiotic as directed on the bottle.   - If you are female and on oral birth control pills, use additional methods to prevent pregnancy while on antibiotics and for one cycle after.   - you can take otc probiotic to limit upset stomach    - you can take the prescribed Pyridium as directed for discomfort with urination. This may cause your urine to turn orange/red color.    - Follow up with your PCP or specialty clinic as directed in the next 1-2 weeks if not improved or as needed.  You can call [phone] to schedule an appointment with the appropriate provider.    - Go to the ER or seek medical attention immediately if you develop new or worsening symptoms.     - You must understand that you have received an Urgent Care treatment only and that you may be released before all of your medical problems are known or treated.   - You, the patient, will arrange for follow up care as instructed.   - If your condition worsens or fails to improve we recommend that you receive another evaluation at the ER immediately or contact your PCP to discuss your concerns or return here.

## 2022-09-07 LAB — BACTERIA UR CULT: NO GROWTH

## 2022-09-09 ENCOUNTER — TELEPHONE (OUTPATIENT)
Dept: URGENT CARE | Facility: CLINIC | Age: 62
End: 2022-09-09
Payer: COMMERCIAL

## 2022-10-06 ENCOUNTER — LAB VISIT (OUTPATIENT)
Dept: LAB | Facility: HOSPITAL | Age: 62
End: 2022-10-06
Payer: COMMERCIAL

## 2022-10-06 ENCOUNTER — OFFICE VISIT (OUTPATIENT)
Dept: ENDOCRINOLOGY | Facility: CLINIC | Age: 62
End: 2022-10-06
Payer: COMMERCIAL

## 2022-10-06 VITALS
BODY MASS INDEX: 20.45 KG/M2 | HEIGHT: 68 IN | WEIGHT: 134.94 LBS | SYSTOLIC BLOOD PRESSURE: 122 MMHG | DIASTOLIC BLOOD PRESSURE: 74 MMHG | OXYGEN SATURATION: 99 % | HEART RATE: 70 BPM

## 2022-10-06 DIAGNOSIS — E03.8 HYPOTHYROIDISM DUE TO HASHIMOTO'S THYROIDITIS: ICD-10-CM

## 2022-10-06 DIAGNOSIS — R19.7 DIARRHEA, UNSPECIFIED TYPE: ICD-10-CM

## 2022-10-06 DIAGNOSIS — M81.0 OSTEOPOROSIS, UNSPECIFIED OSTEOPOROSIS TYPE, UNSPECIFIED PATHOLOGICAL FRACTURE PRESENCE: ICD-10-CM

## 2022-10-06 DIAGNOSIS — E06.3 HYPOTHYROIDISM DUE TO HASHIMOTO'S THYROIDITIS: Primary | ICD-10-CM

## 2022-10-06 DIAGNOSIS — E03.8 HYPOTHYROIDISM DUE TO HASHIMOTO'S THYROIDITIS: Primary | ICD-10-CM

## 2022-10-06 DIAGNOSIS — K75.4 AUTOIMMUNE HEPATITIS: ICD-10-CM

## 2022-10-06 DIAGNOSIS — C50.911 MALIGNANT NEOPLASM OF RIGHT FEMALE BREAST, UNSPECIFIED ESTROGEN RECEPTOR STATUS, UNSPECIFIED SITE OF BREAST: ICD-10-CM

## 2022-10-06 DIAGNOSIS — E06.3 HYPOTHYROIDISM DUE TO HASHIMOTO'S THYROIDITIS: ICD-10-CM

## 2022-10-06 LAB
25(OH)D3+25(OH)D2 SERPL-MCNC: 39 NG/ML (ref 30–96)
ALBUMIN SERPL BCP-MCNC: 3.9 G/DL (ref 3.5–5.2)
ALP SERPL-CCNC: 61 U/L (ref 55–135)
ALT SERPL W/O P-5'-P-CCNC: 12 U/L (ref 10–44)
ANION GAP SERPL CALC-SCNC: 8 MMOL/L (ref 8–16)
AST SERPL-CCNC: 18 U/L (ref 10–40)
BILIRUB SERPL-MCNC: 0.5 MG/DL (ref 0.1–1)
BUN SERPL-MCNC: 13 MG/DL (ref 8–23)
CALCIUM SERPL-MCNC: 9.4 MG/DL (ref 8.7–10.5)
CANCER AG125 SERPL-ACNC: 14 U/ML (ref 0–30)
CHLORIDE SERPL-SCNC: 104 MMOL/L (ref 95–110)
CO2 SERPL-SCNC: 27 MMOL/L (ref 23–29)
CREAT SERPL-MCNC: 0.6 MG/DL (ref 0.5–1.4)
EST. GFR  (NO RACE VARIABLE): >60 ML/MIN/1.73 M^2
GLUCOSE SERPL-MCNC: 98 MG/DL (ref 70–110)
POTASSIUM SERPL-SCNC: 3.7 MMOL/L (ref 3.5–5.1)
PROT SERPL-MCNC: 6.4 G/DL (ref 6–8.4)
SODIUM SERPL-SCNC: 139 MMOL/L (ref 136–145)
T4 FREE SERPL-MCNC: 1.4 NG/DL (ref 0.71–1.51)
TSH SERPL DL<=0.005 MIU/L-ACNC: 0.02 UIU/ML (ref 0.4–4)

## 2022-10-06 PROCEDURE — 99999 PR PBB SHADOW E&M-EST. PATIENT-LVL V: ICD-10-PCS | Mod: PBBFAC,,, | Performed by: INTERNAL MEDICINE

## 2022-10-06 PROCEDURE — 86304 IMMUNOASSAY TUMOR CA 125: CPT | Performed by: PHYSICIAN ASSISTANT

## 2022-10-06 PROCEDURE — 3008F BODY MASS INDEX DOCD: CPT | Mod: CPTII,S$GLB,, | Performed by: INTERNAL MEDICINE

## 2022-10-06 PROCEDURE — 3044F HG A1C LEVEL LT 7.0%: CPT | Mod: CPTII,S$GLB,, | Performed by: INTERNAL MEDICINE

## 2022-10-06 PROCEDURE — 1159F PR MEDICATION LIST DOCUMENTED IN MEDICAL RECORD: ICD-10-PCS | Mod: CPTII,S$GLB,, | Performed by: INTERNAL MEDICINE

## 2022-10-06 PROCEDURE — 3074F PR MOST RECENT SYSTOLIC BLOOD PRESSURE < 130 MM HG: ICD-10-PCS | Mod: CPTII,S$GLB,, | Performed by: INTERNAL MEDICINE

## 2022-10-06 PROCEDURE — 3044F PR MOST RECENT HEMOGLOBIN A1C LEVEL <7.0%: ICD-10-PCS | Mod: CPTII,S$GLB,, | Performed by: INTERNAL MEDICINE

## 2022-10-06 PROCEDURE — 99214 OFFICE O/P EST MOD 30 MIN: CPT | Mod: S$GLB,,, | Performed by: INTERNAL MEDICINE

## 2022-10-06 PROCEDURE — 1160F RVW MEDS BY RX/DR IN RCRD: CPT | Mod: CPTII,S$GLB,, | Performed by: INTERNAL MEDICINE

## 2022-10-06 PROCEDURE — 3008F PR BODY MASS INDEX (BMI) DOCUMENTED: ICD-10-PCS | Mod: CPTII,S$GLB,, | Performed by: INTERNAL MEDICINE

## 2022-10-06 PROCEDURE — 3078F DIAST BP <80 MM HG: CPT | Mod: CPTII,S$GLB,, | Performed by: INTERNAL MEDICINE

## 2022-10-06 PROCEDURE — 84443 ASSAY THYROID STIM HORMONE: CPT | Performed by: PHYSICIAN ASSISTANT

## 2022-10-06 PROCEDURE — 80053 COMPREHEN METABOLIC PANEL: CPT | Performed by: PHYSICIAN ASSISTANT

## 2022-10-06 PROCEDURE — 99999 PR PBB SHADOW E&M-EST. PATIENT-LVL V: CPT | Mod: PBBFAC,,, | Performed by: INTERNAL MEDICINE

## 2022-10-06 PROCEDURE — 3074F SYST BP LT 130 MM HG: CPT | Mod: CPTII,S$GLB,, | Performed by: INTERNAL MEDICINE

## 2022-10-06 PROCEDURE — 1159F MED LIST DOCD IN RCRD: CPT | Mod: CPTII,S$GLB,, | Performed by: INTERNAL MEDICINE

## 2022-10-06 PROCEDURE — 82306 VITAMIN D 25 HYDROXY: CPT | Performed by: PHYSICIAN ASSISTANT

## 2022-10-06 PROCEDURE — 36415 COLL VENOUS BLD VENIPUNCTURE: CPT | Performed by: PHYSICIAN ASSISTANT

## 2022-10-06 PROCEDURE — 1160F PR REVIEW ALL MEDS BY PRESCRIBER/CLIN PHARMACIST DOCUMENTED: ICD-10-PCS | Mod: CPTII,S$GLB,, | Performed by: INTERNAL MEDICINE

## 2022-10-06 PROCEDURE — 84439 ASSAY OF FREE THYROXINE: CPT | Performed by: PHYSICIAN ASSISTANT

## 2022-10-06 PROCEDURE — 3078F PR MOST RECENT DIASTOLIC BLOOD PRESSURE < 80 MM HG: ICD-10-PCS | Mod: CPTII,S$GLB,, | Performed by: INTERNAL MEDICINE

## 2022-10-06 PROCEDURE — 99214 PR OFFICE/OUTPT VISIT, EST, LEVL IV, 30-39 MIN: ICD-10-PCS | Mod: S$GLB,,, | Performed by: INTERNAL MEDICINE

## 2022-10-06 NOTE — ASSESSMENT & PLAN NOTE
--Patient with osteoporosis  --No history of fracture  --On drug holdiay started on August 2021    --Start Calcium Supplement  --Continue Vitamin D supplement  --Repeat DXA 8/2023

## 2022-10-06 NOTE — ASSESSMENT & PLAN NOTE
Complaints of diarrhea after surgery then worsened after antibiotics recently.    -Referral to GI

## 2022-10-06 NOTE — ASSESSMENT & PLAN NOTE
--Patient with hypothyroidism  --Previously on [place] thyroid, now on synthroid.  Most recent TSH low at 0.021 done in April 2022  --Repeat TSH today  --Continue Synthroid 112 mcg once daily, will titrate based on level

## 2022-10-06 NOTE — PROGRESS NOTES
Subjective:      Patient ID: Nuris Cm is a 61 y.o. female.    Chief Complaint:  Hashimoto's thyroid disease and osteoporosis.       History of Present Illness  Ms. Cm presents for follow up of Hashimoto's thyroid disease and osteoporosis.   Last visit  7/2021.     Has active history of autoimmune hepatitis treated with Imuran, breast CA (BRCA 1 positive) in remission and likely Celiacs disease on gluten free diet.      Last TSH low in April  Currently on Synthroid 112 mcg tablet  Report palpitations, cold intolerance  Reports Diarrhea after antibiotics   Has had more fatigue recently.     Regarding Osteoporosis  Previously On Fosamax starting 11/2016 stopped August 2021.On drug holiday. She had been taking Fosamax correctly, however, at previous visit she was taking Fosamax she purchased in [place] for 6-8 months and was also hyperthyroid based on TSH for 2 years before.  She returned to euthyroid after taking Fosamax from local pharmacy     Reports fall over a year ago where she hit head without fracture.  Recent fall off ladder causing rotator cuff injury requiring surgery.      Not on calcium supplement.  Vitamin D reports taking uncertain dose.    DXA 8/2021  COMPARISON:  Comparison study done on 05/21/2019.   Lumbar spine:    BMD 0.914 g/cm2 and T-score -1.2.   Total Hip:           BMD 0.710 g/cm2 and T-score -1.9.   Distal 1/3 radius: Not applicable     FINDINGS:  Lumbar spine (L1-L4):              BMD is 0.964 g/cm2, T-score is -0.8, and Z-score is 0.8.   Total hip:                                BMD is 0.729 g/cm2, T-score is -1.7, and Z-score is -0.7.   Femoral neck:                          BMD is 0.557 g/cm2, T-score is -2.6, and Z-score is -1.2.   Distal 1/3 radius:                      Not applicable     FRAX:   21% risk of a major osteoporotic fracture in the next 10 years.   2.6% risk of hip fracture in the next 10 years.     Impression:   *Osteoporosis on treatment with Fosamax      Review of  Systems   Gastrointestinal:  Positive for diarrhea.   ROS as above  Objective:   Physical Exam  Constitutional:       Appearance: Normal appearance.   Neck:      Thyroid: No thyromegaly or thyroid tenderness.   Cardiovascular:      Rate and Rhythm: Normal rate and regular rhythm.      Heart sounds: No murmur heard.  Neurological:      Mental Status: She is alert.   Psychiatric:         Mood and Affect: Mood normal.         Behavior: Behavior normal.     BP Readings from Last 3 Encounters:   10/06/22 122/74   09/05/22 126/69   04/19/22 (!) 145/67     Wt Readings from Last 1 Encounters:   10/06/22 1524 61.2 kg (134 lb 14.7 oz)       Body mass index is 20.51 kg/m².    Lab Review:   Lab Results   Component Value Date    HGBA1C 4.8 04/07/2022     Lab Results   Component Value Date    CHOL 176 04/07/2022    HDL 56 04/07/2022    LDLCALC 105.8 04/07/2022    TRIG 71 04/07/2022    CHOLHDL 31.8 04/07/2022     Lab Results   Component Value Date     10/06/2022    K 3.7 10/06/2022     10/06/2022    CO2 27 10/06/2022    GLU 98 10/06/2022    BUN 13 10/06/2022    CREATININE 0.6 10/06/2022    CALCIUM 9.4 10/06/2022    PROT 6.4 10/06/2022    ALBUMIN 3.9 10/06/2022    BILITOT 0.5 10/06/2022    ALKPHOS 61 10/06/2022    AST 18 10/06/2022    ALT 12 10/06/2022    ANIONGAP 8 10/06/2022    ESTGFRAFRICA >60.0 04/07/2022    EGFRNONAA >60.0 04/07/2022    TSH 0.017 (L) 10/06/2022         Assessment and Plan     Hypothyroidism due to Hashimoto's thyroiditis  --Patient with hypothyroidism  --Previously on [place] thyroid, now on synthroid.  Most recent TSH low at 0.021 done in April 2022  --Repeat TSH today  --Continue Synthroid 112 mcg once daily, will titrate based on level    Osteoporosis  --Patient with osteoporosis  --No history of fracture  --On drug holdiay started on August 2021    --Start Calcium Supplement  --Continue Vitamin D supplement  --Repeat DXA 8/2023    Autoimmune hepatitis  --Currently on Imuran  --Followed in [place]      Diarrhea  Complaints of diarrhea after surgery then worsened after antibiotics recently.    -Referral to GI     Breast cancer  -- ordered at request of pt      RTC in one year    Michael Dennis PA-C    I have reviewed and concur with the PA's history,assessment, and plan.  I have personally interviewed the patient and all questions were answered.     Artemio Devine M.D. Staff Endocrinology

## 2022-10-07 ENCOUNTER — PATIENT MESSAGE (OUTPATIENT)
Dept: ENDOCRINOLOGY | Facility: CLINIC | Age: 62
End: 2022-10-07
Payer: COMMERCIAL

## 2022-10-07 DIAGNOSIS — E06.3 HYPOTHYROIDISM DUE TO HASHIMOTO'S THYROIDITIS: ICD-10-CM

## 2022-10-07 DIAGNOSIS — E03.8 HYPOTHYROIDISM DUE TO HASHIMOTO'S THYROIDITIS: ICD-10-CM

## 2022-10-07 RX ORDER — LEVOTHYROXINE SODIUM 100 UG/1
100 TABLET ORAL
Qty: 30 TABLET | Refills: 11 | Status: SHIPPED | OUTPATIENT
Start: 2022-10-07 | End: 2022-11-18

## 2022-11-04 DIAGNOSIS — K75.4 AUTOIMMUNE HEPATITIS: Primary | ICD-10-CM

## 2022-11-04 DIAGNOSIS — E06.3 HYPOTHYROIDISM DUE TO HASHIMOTO'S THYROIDITIS: ICD-10-CM

## 2022-11-04 DIAGNOSIS — E03.8 HYPOTHYROIDISM DUE TO HASHIMOTO'S THYROIDITIS: ICD-10-CM

## 2022-11-18 ENCOUNTER — LAB VISIT (OUTPATIENT)
Dept: LAB | Facility: HOSPITAL | Age: 62
End: 2022-11-18
Payer: COMMERCIAL

## 2022-11-18 ENCOUNTER — PATIENT MESSAGE (OUTPATIENT)
Dept: ENDOCRINOLOGY | Facility: HOSPITAL | Age: 62
End: 2022-11-18
Payer: COMMERCIAL

## 2022-11-18 DIAGNOSIS — E03.8 HYPOTHYROIDISM DUE TO HASHIMOTO'S THYROIDITIS: ICD-10-CM

## 2022-11-18 DIAGNOSIS — E06.3 HYPOTHYROIDISM DUE TO HASHIMOTO'S THYROIDITIS: Primary | ICD-10-CM

## 2022-11-18 DIAGNOSIS — E03.8 HYPOTHYROIDISM DUE TO HASHIMOTO'S THYROIDITIS: Primary | ICD-10-CM

## 2022-11-18 DIAGNOSIS — E06.3 HYPOTHYROIDISM DUE TO HASHIMOTO'S THYROIDITIS: ICD-10-CM

## 2022-11-18 LAB
T4 FREE SERPL-MCNC: 1.31 NG/DL (ref 0.71–1.51)
TSH SERPL DL<=0.005 MIU/L-ACNC: 0.02 UIU/ML (ref 0.4–4)

## 2022-11-18 PROCEDURE — 84443 ASSAY THYROID STIM HORMONE: CPT | Performed by: PHYSICIAN ASSISTANT

## 2022-11-18 PROCEDURE — 84439 ASSAY OF FREE THYROXINE: CPT | Performed by: PHYSICIAN ASSISTANT

## 2022-11-18 PROCEDURE — 36415 COLL VENOUS BLD VENIPUNCTURE: CPT | Mod: PO | Performed by: PHYSICIAN ASSISTANT

## 2022-11-18 RX ORDER — LEVOTHYROXINE SODIUM 88 UG/1
88 TABLET ORAL
Qty: 30 TABLET | Refills: 11 | Status: SHIPPED | OUTPATIENT
Start: 2022-11-18 | End: 2023-11-18

## 2022-11-22 ENCOUNTER — TELEPHONE (OUTPATIENT)
Dept: INFECTIOUS DISEASES | Facility: CLINIC | Age: 62
End: 2022-11-22
Payer: COMMERCIAL

## 2022-11-22 ENCOUNTER — OFFICE VISIT (OUTPATIENT)
Dept: INFECTIOUS DISEASES | Facility: CLINIC | Age: 62
End: 2022-11-22
Payer: COMMERCIAL

## 2022-11-22 VITALS
HEART RATE: 116 BPM | HEIGHT: 68 IN | DIASTOLIC BLOOD PRESSURE: 77 MMHG | SYSTOLIC BLOOD PRESSURE: 118 MMHG | BODY MASS INDEX: 20.45 KG/M2 | TEMPERATURE: 98 F | WEIGHT: 134.94 LBS

## 2022-11-22 DIAGNOSIS — K75.4 AUTOIMMUNE HEPATITIS: ICD-10-CM

## 2022-11-22 DIAGNOSIS — R39.15 URINARY URGENCY: Primary | ICD-10-CM

## 2022-11-22 DIAGNOSIS — E06.3 HYPOTHYROIDISM DUE TO HASHIMOTO'S THYROIDITIS: ICD-10-CM

## 2022-11-22 DIAGNOSIS — E03.8 HYPOTHYROIDISM DUE TO HASHIMOTO'S THYROIDITIS: ICD-10-CM

## 2022-11-22 PROCEDURE — 3008F BODY MASS INDEX DOCD: CPT | Mod: CPTII,S$GLB,, | Performed by: STUDENT IN AN ORGANIZED HEALTH CARE EDUCATION/TRAINING PROGRAM

## 2022-11-22 PROCEDURE — 3078F DIAST BP <80 MM HG: CPT | Mod: CPTII,S$GLB,, | Performed by: STUDENT IN AN ORGANIZED HEALTH CARE EDUCATION/TRAINING PROGRAM

## 2022-11-22 PROCEDURE — 3074F PR MOST RECENT SYSTOLIC BLOOD PRESSURE < 130 MM HG: ICD-10-PCS | Mod: CPTII,S$GLB,, | Performed by: STUDENT IN AN ORGANIZED HEALTH CARE EDUCATION/TRAINING PROGRAM

## 2022-11-22 PROCEDURE — 3008F PR BODY MASS INDEX (BMI) DOCUMENTED: ICD-10-PCS | Mod: CPTII,S$GLB,, | Performed by: STUDENT IN AN ORGANIZED HEALTH CARE EDUCATION/TRAINING PROGRAM

## 2022-11-22 PROCEDURE — 3044F PR MOST RECENT HEMOGLOBIN A1C LEVEL <7.0%: ICD-10-PCS | Mod: CPTII,S$GLB,, | Performed by: STUDENT IN AN ORGANIZED HEALTH CARE EDUCATION/TRAINING PROGRAM

## 2022-11-22 PROCEDURE — 99999 PR PBB SHADOW E&M-EST. PATIENT-LVL V: CPT | Mod: PBBFAC,,, | Performed by: STUDENT IN AN ORGANIZED HEALTH CARE EDUCATION/TRAINING PROGRAM

## 2022-11-22 PROCEDURE — 99203 OFFICE O/P NEW LOW 30 MIN: CPT | Mod: S$GLB,,, | Performed by: STUDENT IN AN ORGANIZED HEALTH CARE EDUCATION/TRAINING PROGRAM

## 2022-11-22 PROCEDURE — 3078F PR MOST RECENT DIASTOLIC BLOOD PRESSURE < 80 MM HG: ICD-10-PCS | Mod: CPTII,S$GLB,, | Performed by: STUDENT IN AN ORGANIZED HEALTH CARE EDUCATION/TRAINING PROGRAM

## 2022-11-22 PROCEDURE — 3074F SYST BP LT 130 MM HG: CPT | Mod: CPTII,S$GLB,, | Performed by: STUDENT IN AN ORGANIZED HEALTH CARE EDUCATION/TRAINING PROGRAM

## 2022-11-22 PROCEDURE — 99999 PR PBB SHADOW E&M-EST. PATIENT-LVL V: ICD-10-PCS | Mod: PBBFAC,,, | Performed by: STUDENT IN AN ORGANIZED HEALTH CARE EDUCATION/TRAINING PROGRAM

## 2022-11-22 PROCEDURE — 99203 PR OFFICE/OUTPT VISIT, NEW, LEVL III, 30-44 MIN: ICD-10-PCS | Mod: S$GLB,,, | Performed by: STUDENT IN AN ORGANIZED HEALTH CARE EDUCATION/TRAINING PROGRAM

## 2022-11-22 PROCEDURE — 1159F PR MEDICATION LIST DOCUMENTED IN MEDICAL RECORD: ICD-10-PCS | Mod: CPTII,S$GLB,, | Performed by: STUDENT IN AN ORGANIZED HEALTH CARE EDUCATION/TRAINING PROGRAM

## 2022-11-22 PROCEDURE — 1159F MED LIST DOCD IN RCRD: CPT | Mod: CPTII,S$GLB,, | Performed by: STUDENT IN AN ORGANIZED HEALTH CARE EDUCATION/TRAINING PROGRAM

## 2022-11-22 PROCEDURE — 3044F HG A1C LEVEL LT 7.0%: CPT | Mod: CPTII,S$GLB,, | Performed by: STUDENT IN AN ORGANIZED HEALTH CARE EDUCATION/TRAINING PROGRAM

## 2022-11-22 RX ORDER — ALBUTEROL SULFATE 90 UG/1
2 AEROSOL, METERED RESPIRATORY (INHALATION) DAILY PRN
COMMUNITY

## 2022-11-22 RX ORDER — TRIAMCINOLONE ACETONIDE 1 MG/G
OINTMENT TOPICAL
COMMUNITY
Start: 2022-11-04

## 2022-11-22 RX ORDER — FLUOROURACIL 5 MG/G
CREAM TOPICAL
COMMUNITY

## 2022-11-22 RX ORDER — NITROFURANTOIN 25; 75 MG/1; MG/1
CAPSULE ORAL
COMMUNITY
Start: 2022-10-14

## 2022-11-22 RX ORDER — AMMONIUM LACTATE 12 G/100G
LOTION TOPICAL
COMMUNITY
Start: 2022-11-09

## 2022-11-22 RX ORDER — CICLOPIROX 1 G/100ML
SHAMPOO TOPICAL
COMMUNITY
Start: 2022-07-01

## 2022-11-22 RX ORDER — VENLAFAXINE 37.5 MG/1
37.5 TABLET ORAL
COMMUNITY

## 2022-11-22 RX ORDER — CEPHALEXIN 250 MG/1
250 CAPSULE ORAL 4 TIMES DAILY
COMMUNITY
Start: 2022-09-12

## 2022-11-22 RX ORDER — AZITHROMYCIN 250 MG/1
TABLET, FILM COATED ORAL
COMMUNITY
Start: 2022-11-04

## 2022-11-22 RX ORDER — CIPROFLOXACIN 500 MG/1
500 TABLET ORAL 2 TIMES DAILY
COMMUNITY
Start: 2022-10-07

## 2022-11-22 RX ORDER — PREDNISONE 20 MG/1
20 TABLET ORAL DAILY
COMMUNITY
Start: 2022-11-04

## 2022-11-22 RX ORDER — AZATHIOPRINE 50 MG/1
50 TABLET ORAL
COMMUNITY
Start: 2022-03-04

## 2022-11-22 RX ORDER — METHENAMINE HIPPURATE 1000 MG/1
1 TABLET ORAL 2 TIMES DAILY
Qty: 180 TABLET | Refills: 0 | Status: SHIPPED | OUTPATIENT
Start: 2022-11-22 | End: 2023-02-20

## 2022-11-22 RX ORDER — VENLAFAXINE 75 MG/1
37.5 TABLET ORAL
COMMUNITY

## 2022-11-22 RX ORDER — FLUOCINONIDE TOPICAL SOLUTION USP, 0.05% 0.5 MG/ML
SOLUTION TOPICAL
COMMUNITY
Start: 2022-07-01

## 2022-11-22 RX ORDER — THYROID 90 MG/1
60 TABLET ORAL
COMMUNITY

## 2022-11-22 NOTE — PROGRESS NOTES
Infectious Disease Clinic Note    Patient Name: Nuris Cm  YOB: 1960    PRESENTING HISTORY       History of Present Illness:  Ms. Nuris Cm is a 62 y.o. female w/ significant PMHx of autoimmune hepatitis currently on Imuran and followed in [place] who presents as referral for management of urinary symptoms. For more than a month, patient has been experiencing a sense of urgency to urinate along with pressure in her pelvis. Denies burning with urination, fever, or chills. Has tried multiple antibiotics from different prescribers which only made her feel ill, predominantly with GI upset. No record she has always given a urine sample prior to antibiotic therapy, though she has seen providers outside [facility] system as well for which we do not have records. Urine culture from 9/5/22 in our system showed no growth. Patient feels she would benefit from urologic evaluation with which I agree. Her symptoms are not indicative of UTI but rather physiologic condition related to either bladder or pelvis. Patient agreeable to holding off on further antibiotic therapy. Is open to trying methenamine to change pH of her urine and aid in ridding colonized bacteria. Discussed monitoring LFT as this medication can sometimes harm the liver, more commonly when there is baseline decompensation. If urology office does not contact patient prior to holidays she will let our office know.     Review of Systems:  Constitutional: no fever or chills  Eyes: no visual changes  ENT: no nasal congestion or sore throat  Respiratory: no cough or shortness of breath  Cardiovascular: no chest pain  Gastrointestinal: no nausea or vomiting, no abdominal pain, no constipation, no diarrhea  Genitourinary: no hematuria or dysuria; urinary urgency and pelvic floor pressure; urgency sometimes to point of becoming incontinent and does not feel she has completely voided   Musculoskeletal: no arthralgias or myalgias  Skin: no  rash  Neurological: no headaches, numbness, or paresthesias    PAST HISTORY:     Immunization History   Administered Date(s) Administered    Hepatitis A, Adult 09/26/2016, 03/27/2017    Influenza - Quadrivalent 09/26/2016    Typhoid - ViCPs 09/26/2016       Past Medical History:   Diagnosis Date    Arthritis     Asthma     Breast cancer 2008    double mastecomy    Depression     Encounter for blood transfusion     Hepatitis 1998    autoimmune    Thyroid disease        Past Surgical History:   Procedure Laterality Date    BREAST RECONSTRUCTION  2010    COSMETIC SURGERY      ESOPHAGOGASTRODUODENOSCOPY N/A 04/19/2022    Procedure: ESOPHAGOGASTRODUODENOSCOPY (EGD);  Surgeon: Alexa Odell MD;  Location: [address]);  Service: Endoscopy;  Laterality: N/A;  fully vaccinated, instructions sent to [email or web address]    HYSTERECTOMY  2010    masectomy  2008    ROTATOR CUFF REPAIR Right 11/2021    TONSILLECTOMY         Family History   Problem Relation Age of Onset    Arthritis Mother     Heart failure Father     Arthritis Maternal Aunt     Heart failure Paternal Uncle     Cancer Maternal Grandmother     Cancer Paternal Grandmother        Social History     Socioeconomic History    Marital status:    Tobacco Use    Smoking status: Never    Smokeless tobacco: Never   Substance and Sexual Activity    Alcohol use: Yes     Comment: 2-3 drinks a week    Drug use: No    Sexual activity: Yes     Partners: Male       MEDICATIONS & ALLERGIES:     Current Outpatient Medications on File Prior to Visit   Medication Sig    ammonium lactate (LAC-HYDRIN) 12 % lotion APPLY TO THE AFFECTED AREA AS NEEDED    azaTHIOprine (IMURAN) 50 mg Tab Take 50 mg by mouth.    azelastine (ASTELIN) 137 mcg (0.1 %) nasal spray 2 sprays once daily.    celecoxib (CELEBREX) 200 MG capsule Take 200 mg by mouth once daily.     ciclopirox 1 % shampoo LATHER INTO SCALP AND LET SIT FOR 5 MINUTES BEFORE RINSING ONE DAY A WEEK    diphenoxylate-atropine  2.5-0.025 mg (LOMOTIL) 2.5-0.025 mg per tablet Take 1 tablet by mouth 3 (three) times daily as needed for Diarrhea.    fluocinonide (LIDEX) 0.05 % external solution APPLY TO THE AFFECTED AREA EVERY DAY AS NEEDED FOR ITCHING    sumatriptan (IMITREX) 100 MG tablet Take 100 mg by mouth daily as needed.    traZODone (DESYREL) 100 MG tablet Take 100 mg by mouth nightly.    triamcinolone acetonide 0.1% (KENALOG) 0.1 % ointment APPLY EXTERNALLY TO THE AFFECTED AREA EVERY DAY FOR 7 DAYS    valACYclovir (VALTREX) 500 MG tablet TAKE 1 TABLET BY MOUTH EVERY DAY    albuterol (PROVENTIL/VENTOLIN HFA) 90 mcg/actuation inhaler Inhale 2 puffs into the lungs daily as needed.    alendronate (FOSAMAX) 70 MG tablet Take 1 tablet (70 mg total) by mouth every 7 days. (Patient not taking: Reported on 2022)    ARIPiprazole (ABILIFY) 2 MG Tab Take 5 mg by mouth once daily.    arm brace Misc 1 Piece by Misc.(Non-Drug; Combo Route) route once daily. (Patient not taking: Reported on 2022)    azathioprine (IMURAN) 50 mg Tab Take 25 mg by mouth once daily.    azithromycin (Z-EVELIN) 250 MG tablet     cephALEXin (KEFLEX) 250 MG capsule Take 250 mg by mouth 4 (four) times daily.    ciprofloxacin HCl (CIPRO) 500 MG tablet Take 500 mg by mouth 2 (two) times daily.    fluoruracil (CARAC) 0.5 % cream Apply topically.    gabapentin (NEURONTIN) 300 MG capsule Take 300 mg by mouth nightly.    HYDROcodone-acetaminophen (NORCO) 5-325 mg per tablet Take 1 tablet by mouth 3 (three) times daily as needed.    levothyroxine (SYNTHROID) 88 MCG tablet Take 1 tablet (88 mcg total) by mouth before breakfast. (Patient not taking: Reported on 2022)    meloxicam (MOBIC) 15 MG tablet Take 1 tablet by mouth once daily.    mupirocin (BACTROBAN) 2 % ointment Apply to affected area 3 times daily (Patient not taking: Reported on 2022)    nitrofurantoin, macrocrystal-monohydrate, (MACROBID) 100 MG capsule SMARTSI Capsule(s) By Mouth Morning-Night     "predniSONE (DELTASONE) 20 MG tablet Take 20 mg by mouth once daily.    thyroid, pork, (ARMOUR THYROID) 90 mg Tab Take 60 mg by mouth.    tizanidine (ZANAFLEX) 4 MG tablet Take 4 mg by mouth nightly as needed.     venlafaxine (EFFEXOR) 37.5 MG Tab Take 37.5 mg by mouth.    venlafaxine (EFFEXOR) 75 MG tablet Take 75 mg by mouth once daily.    venlafaxine (EFFEXOR) 75 MG tablet Take 37.5 mg by mouth.     No current facility-administered medications on file prior to visit.       Review of patient's allergies indicates:   Allergen Reactions    Codeine Hives    Gluten Diarrhea    Latex Hives       OBJECTIVE:   Vital Signs:  Vitals:    11/22/22 1139   BP: 118/77   Pulse: (!) 116   Temp: 97.7 °F (36.5 °C)   TempSrc: Oral   Weight: 61.2 kg (134 lb 14.7 oz)   Height: 5' 8" (1.727 m)       No results found for this or any previous visit (from the past 24 hour(s)).      Physical Exam:   General:  Well developed, well nourished, no acute distress  HEENT:  Normocephalic, atraumatic  CVS:  RRR, S1 and S2 normal, no murmurs, rubs, gallops  Resp:  Lungs clear to auscultation, no wheezes, rales, rhonchi  GI:  Abdomen soft, non-tender, non-distended, normoactive bowel sounds, no masses  MSK:  No muscle atrophy, peripheral edema, full range of motion  Skin:  No rashes, ulcers, erythema  Psych:  Alert and oriented to person, place, and time    ASSESSMENT:     Urinary urgency  --Symptoms more indicative of pelvic floor condition with urgency and pelvic pressure  --No current symptoms to indicate acute UTI   --History of exposure to multiple antibiotics that patient did not tolerate   --Most recent urine culture no growth on 9/5/22   --Do not recommend another course of antibiotics at this time   --Rather, recommend urology evaluation for noninfectious cause of urinary urgency   --Referral placed today   --For now, have sent prescription for methenamine to pharmacy to see if changing pH of urine will help with any discomfort caused by " colonized bacteria   --Will check LFT in one month as methenamine can sometimes cause liver issues, though patient's liver currently functional with normal LFT most recently   --Advised to contact our office if she experiences adverse effects   --Follow up as needed     PLAN:     Nuris was seen today for ear/neck/nose infection .    Diagnoses and all orders for this visit:    Urinary urgency  -     Ambulatory referral/consult to Urology; Future    Autoimmune hepatitis  -     Ambulatory referral/consult to Infectious Disease  -     Comprehensive Metabolic Panel; Future    Hypothyroidism due to Hashimoto's thyroiditis  -     Ambulatory referral/consult to Infectious Disease    Other orders  -     methenamine (HIPREX) 1 gram Tab; Take 1 tablet (1 g total) by mouth 2 (two) times daily.        The total time for evaluation and management services performed on 11/22/22 was greater than 30 minutes.     Desmond Pearson,   PGY-5 Infectious Diseases Fellow

## 2022-11-22 NOTE — TELEPHONE ENCOUNTER
Staff message sent to [facility] Urology pool for appointment scheduling assistance.

## 2022-11-22 NOTE — ASSESSMENT & PLAN NOTE
--Symptoms more indicative of pelvic floor condition with urgency and pelvic pressure  --No current symptoms to indicate acute UTI   --History of exposure to multiple antibiotics that patient did not tolerate   --Most recent urine culture no growth on 9/5/22   --Do not recommend another course of antibiotics at this time   --Rather, recommend urology evaluation for noninfectious cause of urinary urgency   --Referral placed today   --For now, have sent prescription for methenamine to pharmacy to see if changing pH of urine will help with any discomfort caused by colonized bacteria   --Will check LFT in one month as methenamine can sometimes cause liver issues, though patient's liver currently functional with normal LFT most recently   --Advised to contact our office if she experiences adverse effects   --Follow up as needed

## 2022-11-23 ENCOUNTER — OFFICE VISIT (OUTPATIENT)
Dept: UROLOGY | Facility: CLINIC | Age: 62
End: 2022-11-23
Payer: COMMERCIAL

## 2022-11-23 ENCOUNTER — TELEPHONE (OUTPATIENT)
Dept: UROLOGY | Facility: CLINIC | Age: 62
End: 2022-11-23
Payer: COMMERCIAL

## 2022-11-23 VITALS
WEIGHT: 130.31 LBS | BODY MASS INDEX: 19.75 KG/M2 | SYSTOLIC BLOOD PRESSURE: 131 MMHG | HEART RATE: 79 BPM | HEIGHT: 68 IN | DIASTOLIC BLOOD PRESSURE: 68 MMHG

## 2022-11-23 DIAGNOSIS — R39.15 URINARY URGENCY: ICD-10-CM

## 2022-11-23 PROCEDURE — 3075F SYST BP GE 130 - 139MM HG: CPT | Mod: CPTII,S$GLB,, | Performed by: UROLOGY

## 2022-11-23 PROCEDURE — 3078F PR MOST RECENT DIASTOLIC BLOOD PRESSURE < 80 MM HG: ICD-10-PCS | Mod: CPTII,S$GLB,, | Performed by: UROLOGY

## 2022-11-23 PROCEDURE — 3008F PR BODY MASS INDEX (BMI) DOCUMENTED: ICD-10-PCS | Mod: CPTII,S$GLB,, | Performed by: UROLOGY

## 2022-11-23 PROCEDURE — 1159F MED LIST DOCD IN RCRD: CPT | Mod: CPTII,S$GLB,, | Performed by: UROLOGY

## 2022-11-23 PROCEDURE — 99204 PR OFFICE/OUTPT VISIT, NEW, LEVL IV, 45-59 MIN: ICD-10-PCS | Mod: S$GLB,,, | Performed by: UROLOGY

## 2022-11-23 PROCEDURE — 3008F BODY MASS INDEX DOCD: CPT | Mod: CPTII,S$GLB,, | Performed by: UROLOGY

## 2022-11-23 PROCEDURE — 99999 PR PBB SHADOW E&M-EST. PATIENT-LVL V: CPT | Mod: PBBFAC,,, | Performed by: UROLOGY

## 2022-11-23 PROCEDURE — 99204 OFFICE O/P NEW MOD 45 MIN: CPT | Mod: S$GLB,,, | Performed by: UROLOGY

## 2022-11-23 PROCEDURE — 99999 PR PBB SHADOW E&M-EST. PATIENT-LVL V: ICD-10-PCS | Mod: PBBFAC,,, | Performed by: UROLOGY

## 2022-11-23 PROCEDURE — 3044F PR MOST RECENT HEMOGLOBIN A1C LEVEL <7.0%: ICD-10-PCS | Mod: CPTII,S$GLB,, | Performed by: UROLOGY

## 2022-11-23 PROCEDURE — 87086 URINE CULTURE/COLONY COUNT: CPT | Performed by: UROLOGY

## 2022-11-23 PROCEDURE — 3078F DIAST BP <80 MM HG: CPT | Mod: CPTII,S$GLB,, | Performed by: UROLOGY

## 2022-11-23 PROCEDURE — 3075F PR MOST RECENT SYSTOLIC BLOOD PRESS GE 130-139MM HG: ICD-10-PCS | Mod: CPTII,S$GLB,, | Performed by: UROLOGY

## 2022-11-23 PROCEDURE — 1159F PR MEDICATION LIST DOCUMENTED IN MEDICAL RECORD: ICD-10-PCS | Mod: CPTII,S$GLB,, | Performed by: UROLOGY

## 2022-11-23 PROCEDURE — 3044F HG A1C LEVEL LT 7.0%: CPT | Mod: CPTII,S$GLB,, | Performed by: UROLOGY

## 2022-11-23 NOTE — TELEPHONE ENCOUNTER
----- Message from Alyson Alcantar LPN sent at 11/22/2022  5:21 PM CST -----  Regarding: FW: appt access    ----- Message -----  From: Perry Ramos LPN  Sent: 11/22/2022  12:11 PM CST  To: [facility] Urology Clinical Staff  Subject: appt access                                      A referral consult was placed for this patient, could you reach out to patient and assist in getting her scheduled?

## 2022-11-23 NOTE — PROGRESS NOTES
[facility] Department of [facility]      New Recurrent Urinary Tract Infection Note    11/23/2022    Referred by:  Nathanael Pearson DO    HPI: Nuris Cm is a very pleasant 62 y.o. female who is a new patient to our department referred for evaluation of 2-3 months of bladder pain, dysuria and urgency. Unfortunately, she reports only one positive urine culture during this time. Other times, she has begun treatment prior to any urine culture performed. She recently saw ID who were unsure whether her symptoms were related to sUTI.  She reports that these symptoms began 3 months ago. These episodes are marked by symptoms including suprapubic pain, frequency, and urgency.  Her symptoms typically have variably responded to antibiotics. She is symptomatic today. She reports a single positive urine culture from October growing E. Coli.     Independent of episodes of infection, she reports symptoms of irritative voiding including frequency and denies urgency incontinence or dysuria. She denies symptoms of obstructive voiding including decreased stream, hesitancy, intermittency, post void dribbling, and sense of incomplete emptying. Bladder scan PVR was 0 mL.  Her history includes estrogen receptor negative breast cancer. She is not on hormonal therapy. She does report vaginal dryness suggestive of atrophy.       Previous evaluation for her infections have included no upper or lower tract evaluation. Previous treatments for her recurrent infections have included antibiotics for each infection.     A review of 10+ systems was conducted with pertinent positive and negative findings documented in HPI with all other systems reviewed and negative.    Past medical, family, surgical and social history reviewed as documented in chart with pertinent positive medical, family, surgical and social history detailed in HPI.    Exam Findings:    Deferred to time of cystoscopy Const: no acute distress, conversant and alert  Eyes: anicteric,  extraocular muscles intact  ENMT: normocephalic, Nl oral membranes  Cardio: no cyanosis, nl cap refill  Pulm: no tachypnea; no resp distress  Musc: no laceration, no tenderness  Neuro: alert; oriented x 3  Skin: warm, dry; no petichiae  Psych: no anxiety; normal speech       Assessment/Plan:    Recurrent Urinary Tract Infection (new, addt'l workup): Absent reliable culture information to determine if her symptoms are attributable to infection, it is likely more practical and expeditious to treat these symptoms as if they are due to Usha and complete her evaluation and institute prophylaxis. If her symptoms fail to respond to this, it is suggestive of an alternative underlying etiology such as IC/BPS or OAB. Obviously, if they respond, it is most likely due to Usha. I am recommending upper tract evaluation with renal U/S followed by office cystourethroscopy. She should begin D-mannose 1000 mg bid. It may be worth discussing topical vaginal estrogen with her oncologist, given likely low risk this presents. However, she may also wait to see if she responds to other therapies before undertaking this.

## 2022-11-24 LAB — BACTERIA UR CULT: NO GROWTH

## 2022-12-05 ENCOUNTER — HOSPITAL ENCOUNTER (OUTPATIENT)
Dept: RADIOLOGY | Facility: HOSPITAL | Age: 62
Discharge: HOME | End: 2022-12-05
Attending: UROLOGY
Payer: COMMERCIAL

## 2022-12-05 DIAGNOSIS — R39.15 URINARY URGENCY: ICD-10-CM

## 2022-12-05 PROCEDURE — 76770 US EXAM ABDO BACK WALL COMP: CPT | Mod: TC

## 2022-12-05 PROCEDURE — 76770 US EXAM ABDO BACK WALL COMP: CPT | Mod: 26,,, | Performed by: INTERNAL MEDICINE

## 2022-12-05 PROCEDURE — 76770 US KIDNEY: ICD-10-PCS | Mod: 26,,, | Performed by: INTERNAL MEDICINE

## 2022-12-22 ENCOUNTER — PROCEDURE VISIT (OUTPATIENT)
Dept: UROLOGY | Facility: CLINIC | Age: 62
End: 2022-12-22
Payer: COMMERCIAL

## 2022-12-22 VITALS
SYSTOLIC BLOOD PRESSURE: 131 MMHG | HEART RATE: 73 BPM | RESPIRATION RATE: 16 BRPM | TEMPERATURE: 97 F | DIASTOLIC BLOOD PRESSURE: 58 MMHG | BODY MASS INDEX: 20.38 KG/M2 | HEIGHT: 68 IN | WEIGHT: 134.5 LBS

## 2022-12-22 DIAGNOSIS — N39.0 RECURRENT UTI: ICD-10-CM

## 2022-12-22 DIAGNOSIS — R39.15 URINARY URGENCY: Primary | ICD-10-CM

## 2022-12-22 PROCEDURE — 52000 PR CYSTOURETHROSCOPY: ICD-10-PCS | Mod: S$GLB,,, | Performed by: UROLOGY

## 2022-12-22 PROCEDURE — 52000 CYSTOURETHROSCOPY: CPT | Mod: S$GLB,,, | Performed by: UROLOGY

## 2022-12-22 RX ORDER — LIDOCAINE HYDROCHLORIDE 20 MG/ML
JELLY TOPICAL
Status: COMPLETED | OUTPATIENT
Start: 2022-12-22 | End: 2022-12-22

## 2022-12-22 RX ADMIN — LIDOCAINE HYDROCHLORIDE: 20 JELLY TOPICAL at 01:12

## 2022-12-22 NOTE — PATIENT INSTRUCTIONS
What to Expect After a Cystoscopy  For the next 24-48 hours, you may feel a mild burning when you urinate. This burning is normal and expected. Drink plenty of water to dilute the urine to help relieve the burning sensation. You may also see a small amount of blood in your urine after the procedure.    Unless you are already taking antibiotics, you may be given an antibiotic after the test to prevent infection.    Signs and Symptoms to Report  Call the [facility] at [phone] if you develop any of the following:  Fever of 101 degrees or higher  Chills or persistent bleeding  Inability to urinate

## 2022-12-22 NOTE — PROCEDURES
Office Cystourethroscopy Note    Date: 12/22/2022   Referring Provider: Stefan Ireland MD     Reason for Cystoscopy: Recurrent UTI    Upper Tract Evaluation:   Renal U/S: No hydronephrosis; shadowing that could indicate urolithiasis    Procedure Details: Informed consent was obtained and she was sterily prepped and 1% lidocaine jelly was injected per urethra. A flexible cystoscope was inserted into the bladder via the urethra. There was no evidence of stricture, stenosis, lesions, other obstruction, or diverticulum. Significant findings included a normal unobstructed urethra only. Cystoscopic examination of the bladder revealed orthotopically positioned, normal bilateral ureteral orifices with clear yellow urine effluxing from each orifice. All mucosal surfaces were examined with no apparent stones, tumors, foreign bodies, erythema, trabeculation, diverticula, or ulcers. The procedure was concluded without complications. The patient was not administered a post-procedure antibiotic.     Specimens: No Specimens    Findings: Normal bladder and urethra    Other Findings:  PVR - 0 mL    Pelvic Exam:  Vaginal Mucosa: severe atrophy  Anterior: none  Apical: no apical descent  Posterior: none  RAUL: no RAUL  Urethral Mobility: no hypermobility  Urethral Lesions: no lesions or masses     Assesment and Plan:   Recurrent Urinary Tract Infections: No primary bladder pathology identified today to explain her recurrent urinary tract infections. We have recommended continued treatment discussed at last clinic visit. She has started D-mannose. I did not significant vaginal atrophy. Advised that if the infections continue, she would want to discuss with her oncologist using topical vaginal estrogen. This would reduce the risk of recurrent sUTI.     We'll meet back in a couple of months and see how she is doing with the D-mannose. I am holding off on CT scan; the US is very non-specific for stones and she has no symptoms  currently suggestive of urolithiasis. We may obtain this in the future.

## 2022-12-23 ENCOUNTER — TELEPHONE (OUTPATIENT)
Dept: UROLOGY | Facility: CLINIC | Age: 62
End: 2022-12-23
Payer: COMMERCIAL

## 2022-12-23 NOTE — TELEPHONE ENCOUNTER
----- Message from Arlene Badillo RN sent at 12/22/2022  2:04 PM CST -----  Rtc in 3 weeks   04-Oct-2021 18:30

## 2022-12-23 NOTE — TELEPHONE ENCOUNTER
Call placed to patient. Name and date of birth verified. Patient informed of scheduled appointment noted in epic. Patient informed appointment details are noted in epic. Patient verbalized understanding.

## 2023-01-25 ENCOUNTER — OFFICE VISIT (OUTPATIENT)
Dept: UROLOGY | Facility: CLINIC | Age: 63
End: 2023-01-25
Payer: COMMERCIAL

## 2023-01-25 VITALS
DIASTOLIC BLOOD PRESSURE: 60 MMHG | BODY MASS INDEX: 19.28 KG/M2 | HEIGHT: 68 IN | HEART RATE: 73 BPM | SYSTOLIC BLOOD PRESSURE: 111 MMHG | WEIGHT: 127.19 LBS

## 2023-01-25 DIAGNOSIS — K21.9 GASTROESOPHAGEAL REFLUX DISEASE, UNSPECIFIED WHETHER ESOPHAGITIS PRESENT: Primary | ICD-10-CM

## 2023-01-25 PROCEDURE — 99999 PR PBB SHADOW E&M-EST. PATIENT-LVL V: CPT | Mod: PBBFAC,,, | Performed by: UROLOGY

## 2023-01-25 PROCEDURE — 3078F PR MOST RECENT DIASTOLIC BLOOD PRESSURE < 80 MM HG: ICD-10-PCS | Mod: CPTII,S$GLB,, | Performed by: UROLOGY

## 2023-01-25 PROCEDURE — 1159F PR MEDICATION LIST DOCUMENTED IN MEDICAL RECORD: ICD-10-PCS | Mod: CPTII,S$GLB,, | Performed by: UROLOGY

## 2023-01-25 PROCEDURE — 3074F SYST BP LT 130 MM HG: CPT | Mod: CPTII,S$GLB,, | Performed by: UROLOGY

## 2023-01-25 PROCEDURE — 99213 PR OFFICE/OUTPT VISIT, EST, LEVL III, 20-29 MIN: ICD-10-PCS | Mod: S$GLB,,, | Performed by: UROLOGY

## 2023-01-25 PROCEDURE — 99213 OFFICE O/P EST LOW 20 MIN: CPT | Mod: S$GLB,,, | Performed by: UROLOGY

## 2023-01-25 PROCEDURE — 3074F PR MOST RECENT SYSTOLIC BLOOD PRESSURE < 130 MM HG: ICD-10-PCS | Mod: CPTII,S$GLB,, | Performed by: UROLOGY

## 2023-01-25 PROCEDURE — 99999 PR PBB SHADOW E&M-EST. PATIENT-LVL V: ICD-10-PCS | Mod: PBBFAC,,, | Performed by: UROLOGY

## 2023-01-25 PROCEDURE — 3078F DIAST BP <80 MM HG: CPT | Mod: CPTII,S$GLB,, | Performed by: UROLOGY

## 2023-01-25 PROCEDURE — 3008F PR BODY MASS INDEX (BMI) DOCUMENTED: ICD-10-PCS | Mod: CPTII,S$GLB,, | Performed by: UROLOGY

## 2023-01-25 PROCEDURE — 3008F BODY MASS INDEX DOCD: CPT | Mod: CPTII,S$GLB,, | Performed by: UROLOGY

## 2023-01-25 PROCEDURE — 1159F MED LIST DOCD IN RCRD: CPT | Mod: CPTII,S$GLB,, | Performed by: UROLOGY

## 2023-01-25 NOTE — PROGRESS NOTES
[facility]  Department of Urology      Recurrent Urinary Tract Infection Return Note    1/25/2023    Referred by:  Self, Angela    HPI: Nuris Cm is a very pleasant 62 y.o. woman referred for evaluation of recurrent urinary tract infections. She reports no infections since last visit 2 months ago. She also reports no issues with urolithiasis, hematuria, voiding difficulty, urinary retention or bothersome urinary incontinence since last visit.      Our previous evaluation for her recurrent infections has included:  Renal U/S (Normal upper urinary tract) and cystoscopy showing no lower tract abnormalities. She does have significant vaginal atrophy. Her history includes estrogen receptor negative breast cancer. She is not on hormonal therapy. She does report vaginal dryness suggestive of atrophy. We discussed having her bring this up with her oncologist at next scheduled visit.     Our prior treatments for her recurrent infections have included:   D-mannose 1000 mg BID    A review of 10+ systems was conducted with pertinent positive and negative findings documented in HPI with all other systems reviewed and negative.    Past medical, family, surgical and social history was reviewed as documented in chart with pertinent positive medical, family, surgical and social history detailed in HPI.    Const: no acute distress, conversant and alert  Eyes: anicteric, extraocular muscles intact  ENMT: normocephalic, Nl oral membranes  Cardio: no cyanosis, nl cap refill  Pulm: no tachypnea; no resp distress  Musc: no laceration, no tenderness  Neuro: alert; oriented x 3  Skin: warm, dry; no petichiae  Psych: no anxiety; normal speech     Assessment/Plan:    Recurrent Urinary Tract Infections: She reports no infections since our last visit. We will continue current therapy.     D-mannose 1000 mg bid

## 2023-02-01 ENCOUNTER — TELEPHONE (OUTPATIENT)
Dept: ENDOSCOPY | Facility: HOSPITAL | Age: 63
End: 2023-02-01
Payer: COMMERCIAL

## 2023-02-01 ENCOUNTER — LAB VISIT (OUTPATIENT)
Dept: LAB | Facility: HOSPITAL | Age: 63
End: 2023-02-01
Attending: INTERNAL MEDICINE
Payer: COMMERCIAL

## 2023-02-01 ENCOUNTER — OFFICE VISIT (OUTPATIENT)
Dept: GASTROENTEROLOGY | Facility: CLINIC | Age: 63
End: 2023-02-01
Payer: COMMERCIAL

## 2023-02-01 VITALS — WEIGHT: 128.31 LBS | BODY MASS INDEX: 19.45 KG/M2 | HEIGHT: 68 IN

## 2023-02-01 DIAGNOSIS — R19.8 ALTERNATING CONSTIPATION AND DIARRHEA: ICD-10-CM

## 2023-02-01 DIAGNOSIS — R14.0 BLOATING: ICD-10-CM

## 2023-02-01 DIAGNOSIS — R10.13 DYSPEPSIA: ICD-10-CM

## 2023-02-01 DIAGNOSIS — R13.19 INTERMITTENT DYSPHAGIA: ICD-10-CM

## 2023-02-01 DIAGNOSIS — R19.8 ALTERNATING CONSTIPATION AND DIARRHEA: Primary | ICD-10-CM

## 2023-02-01 DIAGNOSIS — Z12.11 SPECIAL SCREENING FOR MALIGNANT NEOPLASMS, COLON: Primary | ICD-10-CM

## 2023-02-01 DIAGNOSIS — E03.8 HYPOTHYROIDISM DUE TO HASHIMOTO'S THYROIDITIS: ICD-10-CM

## 2023-02-01 DIAGNOSIS — Z12.11 COLON CANCER SCREENING: ICD-10-CM

## 2023-02-01 DIAGNOSIS — R63.4 WEIGHT LOSS: ICD-10-CM

## 2023-02-01 DIAGNOSIS — K75.4 AUTOIMMUNE HEPATITIS: ICD-10-CM

## 2023-02-01 DIAGNOSIS — K22.2 SCHATZKI'S RING: ICD-10-CM

## 2023-02-01 DIAGNOSIS — K44.9 HIATAL HERNIA: ICD-10-CM

## 2023-02-01 DIAGNOSIS — E06.3 HYPOTHYROIDISM DUE TO HASHIMOTO'S THYROIDITIS: ICD-10-CM

## 2023-02-01 LAB
ALBUMIN SERPL BCP-MCNC: 4 G/DL (ref 3.5–5.2)
ALP SERPL-CCNC: 62 U/L (ref 55–135)
ALT SERPL W/O P-5'-P-CCNC: 8 U/L (ref 10–44)
ANION GAP SERPL CALC-SCNC: 8 MMOL/L (ref 8–16)
AST SERPL-CCNC: 18 U/L (ref 10–40)
BASOPHILS # BLD AUTO: 0.02 K/UL (ref 0–0.2)
BASOPHILS NFR BLD: 0.6 % (ref 0–1.9)
BILIRUB SERPL-MCNC: 0.6 MG/DL (ref 0.1–1)
BUN SERPL-MCNC: 9 MG/DL (ref 8–23)
CALCIUM SERPL-MCNC: 9.5 MG/DL (ref 8.7–10.5)
CHLORIDE SERPL-SCNC: 106 MMOL/L (ref 95–110)
CO2 SERPL-SCNC: 27 MMOL/L (ref 23–29)
CREAT SERPL-MCNC: 0.7 MG/DL (ref 0.5–1.4)
CRP SERPL-MCNC: 0.8 MG/L (ref 0–8.2)
DIFFERENTIAL METHOD: ABNORMAL
EOSINOPHIL # BLD AUTO: 0.1 K/UL (ref 0–0.5)
EOSINOPHIL NFR BLD: 1.8 % (ref 0–8)
ERYTHROCYTE [DISTWIDTH] IN BLOOD BY AUTOMATED COUNT: 11.5 % (ref 11.5–14.5)
ERYTHROCYTE [SEDIMENTATION RATE] IN BLOOD BY PHOTOMETRIC METHOD: <2 MM/HR (ref 0–36)
EST. GFR  (NO RACE VARIABLE): >60 ML/MIN/1.73 M^2
GLUCOSE SERPL-MCNC: 91 MG/DL (ref 70–110)
HCT VFR BLD AUTO: 39.7 % (ref 37–48.5)
HGB BLD-MCNC: 13.2 G/DL (ref 12–16)
IMM GRANULOCYTES # BLD AUTO: 0.01 K/UL (ref 0–0.04)
IMM GRANULOCYTES NFR BLD AUTO: 0.3 % (ref 0–0.5)
LIPASE SERPL-CCNC: 17 U/L (ref 4–60)
LYMPHOCYTES # BLD AUTO: 0.6 K/UL (ref 1–4.8)
LYMPHOCYTES NFR BLD: 18.8 % (ref 18–48)
MCH RBC QN AUTO: 34.9 PG (ref 27–31)
MCHC RBC AUTO-ENTMCNC: 33.2 G/DL (ref 32–36)
MCV RBC AUTO: 105 FL (ref 82–98)
MONOCYTES # BLD AUTO: 0.4 K/UL (ref 0.3–1)
MONOCYTES NFR BLD: 11.2 % (ref 4–15)
NEUTROPHILS # BLD AUTO: 2.2 K/UL (ref 1.8–7.7)
NEUTROPHILS NFR BLD: 67.3 % (ref 38–73)
NRBC BLD-RTO: 0 /100 WBC
PLATELET # BLD AUTO: 175 K/UL (ref 150–450)
PMV BLD AUTO: 9.5 FL (ref 9.2–12.9)
POTASSIUM SERPL-SCNC: 3.7 MMOL/L (ref 3.5–5.1)
PROT SERPL-MCNC: 6.7 G/DL (ref 6–8.4)
RBC # BLD AUTO: 3.78 M/UL (ref 4–5.4)
SODIUM SERPL-SCNC: 141 MMOL/L (ref 136–145)
T4 FREE SERPL-MCNC: 1.55 NG/DL (ref 0.71–1.51)
TSH SERPL DL<=0.005 MIU/L-ACNC: 0.03 UIU/ML (ref 0.4–4)
WBC # BLD AUTO: 3.3 K/UL (ref 3.9–12.7)

## 2023-02-01 PROCEDURE — 1160F PR REVIEW ALL MEDS BY PRESCRIBER/CLIN PHARMACIST DOCUMENTED: ICD-10-PCS | Mod: CPTII,S$GLB,, | Performed by: INTERNAL MEDICINE

## 2023-02-01 PROCEDURE — 36415 COLL VENOUS BLD VENIPUNCTURE: CPT | Performed by: INTERNAL MEDICINE

## 2023-02-01 PROCEDURE — 85025 COMPLETE CBC W/AUTO DIFF WBC: CPT | Performed by: INTERNAL MEDICINE

## 2023-02-01 PROCEDURE — 1160F RVW MEDS BY RX/DR IN RCRD: CPT | Mod: CPTII,S$GLB,, | Performed by: INTERNAL MEDICINE

## 2023-02-01 PROCEDURE — 83690 ASSAY OF LIPASE: CPT | Performed by: INTERNAL MEDICINE

## 2023-02-01 PROCEDURE — 99205 PR OFFICE/OUTPT VISIT, NEW, LEVL V, 60-74 MIN: ICD-10-PCS | Mod: S$GLB,,, | Performed by: INTERNAL MEDICINE

## 2023-02-01 PROCEDURE — 86140 C-REACTIVE PROTEIN: CPT | Performed by: INTERNAL MEDICINE

## 2023-02-01 PROCEDURE — 3008F PR BODY MASS INDEX (BMI) DOCUMENTED: ICD-10-PCS | Mod: CPTII,S$GLB,, | Performed by: INTERNAL MEDICINE

## 2023-02-01 PROCEDURE — 80053 COMPREHEN METABOLIC PANEL: CPT | Performed by: INTERNAL MEDICINE

## 2023-02-01 PROCEDURE — 1159F PR MEDICATION LIST DOCUMENTED IN MEDICAL RECORD: ICD-10-PCS | Mod: CPTII,S$GLB,, | Performed by: INTERNAL MEDICINE

## 2023-02-01 PROCEDURE — 86364 TISS TRNSGLTMNASE EA IG CLAS: CPT | Mod: 59 | Performed by: INTERNAL MEDICINE

## 2023-02-01 PROCEDURE — 84439 ASSAY OF FREE THYROXINE: CPT | Performed by: INTERNAL MEDICINE

## 2023-02-01 PROCEDURE — 99999 PR PBB SHADOW E&M-EST. PATIENT-LVL V: CPT | Mod: PBBFAC,,, | Performed by: INTERNAL MEDICINE

## 2023-02-01 PROCEDURE — 85652 RBC SED RATE AUTOMATED: CPT | Performed by: INTERNAL MEDICINE

## 2023-02-01 PROCEDURE — 99205 OFFICE O/P NEW HI 60 MIN: CPT | Mod: S$GLB,,, | Performed by: INTERNAL MEDICINE

## 2023-02-01 PROCEDURE — 99999 PR PBB SHADOW E&M-EST. PATIENT-LVL V: ICD-10-PCS | Mod: PBBFAC,,, | Performed by: INTERNAL MEDICINE

## 2023-02-01 PROCEDURE — 84443 ASSAY THYROID STIM HORMONE: CPT | Performed by: INTERNAL MEDICINE

## 2023-02-01 PROCEDURE — 1159F MED LIST DOCD IN RCRD: CPT | Mod: CPTII,S$GLB,, | Performed by: INTERNAL MEDICINE

## 2023-02-01 PROCEDURE — 3008F BODY MASS INDEX DOCD: CPT | Mod: CPTII,S$GLB,, | Performed by: INTERNAL MEDICINE

## 2023-02-01 RX ORDER — POLYETHYLENE GLYCOL 3350, SODIUM SULFATE ANHYDROUS, SODIUM BICARBONATE, SODIUM CHLORIDE, POTASSIUM CHLORIDE 236; 22.74; 6.74; 5.86; 2.97 G/4L; G/4L; G/4L; G/4L; G/4L
8 POWDER, FOR SOLUTION ORAL ONCE
Qty: 8000 ML | Refills: 0 | Status: SHIPPED | OUTPATIENT
Start: 2023-02-01 | End: 2023-02-01

## 2023-02-01 NOTE — PROGRESS NOTES
[facility] Consultation Note    Reason for Consult:    Chief Complaint   Patient presents with    Gastroesophageal Reflux    GI Problem     Intestinal issues       PCP:   Primary Doctor No    Referring MD:  Stefan Ireland Md  [id number] [place] [address]      HPI:  Nuris Cm is a 62 y.o. female here for evaluation of several GI complaints.  She is here with her  who helps provide part of the history.  She is new to me.  I have reviewed her chart in detail, including outside records.  She was seen for direct access EGD in April of last year for intermittent dysphagia to rice and bread.  No other visits to our GI department in many years.  She is followed by a hepatologist at [facility] for history of autoimmune hepatitis, last seen in November 2022.  She has a longstanding history of bowel problems with diarrhea.  She is been having more problems in the last several months.  Symptoms worsened at the end of last year when she had a urinary tract infection that lasted for about a month.  She was on several different antibiotics throughout that month which really upset her stomach.  She experienced weight loss of about 20 lb.  She was taking a lot of NSAIDs for shoulder surgery after a rotator cuff injury.  She was also on narcotic pain medication, which she has stopped taking.  She takes Celebrex every day.  She is only on 5 medications at this time.  This includes Celebrex daily, Imuran daily, Synthroid daily, valacyclovir, and trazodone for sleep.  She denies blood in the stool or rectal pain.  She has abdominal discomfort and pain after eating as well as around the time of bowel movement.  There is some improvement after bowel movements.  The pain generally is more in the lower abdomen.  There are times where she may go a week or 2 without having any diarrhea, but then have many loose bowel movements.  She feels the diarrhea occurs more often than the constipation.  " She describes herself as a "borderline celiac", but was not diagnosed with Celiac disease.  She had stool testing done in the past that indicated severe intolerance to wheat.  Gluten containing foods cause upset stomach.  She has not been on any probiotics.  She uses Lomotil as needed for diarrhea.  She is on no other stomach medications at this time.    She continues to have intermittent dysphagia solids, but this was much improved after her EGD with dilation in April of last year.  She rarely has heartburn or reflux symptoms.  Greasy and fried foods tend to bother her stomach.  She describes belching like a man at times.      Endoscopic history:   Colonoscopy 08/31/2011 was complete with intubation of the terminal ileum and excellent bowel preparation.  Hemorrhoids were noted.  Otherwise, the exam was unremarkable.      EGD 09/08/2010 was normal     EGD 01/19/2007 was normal     EGD 12/03/2004 was normal     Colonoscopy 12/03/2004 was complete with intubation of the terminal ileum and excellent bowel preparation.  Erythema was seen in the rectum for which biopsies revealed minimal inflammation with architectural distortion.  Otherwise, the exam was unremarkable.  Biopsies of the terminal ileum were normal.  Biopsies of the right and left colon were normal.          ROS:  Constitutional: No fevers, chills, normal appetite  ENT: No congestion, rhinorrhea, or chronic sinus problems  CV: No chest pain or palpitations  Pulm: No cough, No shortness of breath  Ophtho: No vision changes or pain, but has occasional eye pain  GI: see HPI  Derm: No rash or lesions  Heme: No lymphadenopathy, No bruising  MSK: Positive for joint pains in the elbows and shoulder, no joint swelling        Medical History:  has a past medical history of Arthritis, Asthma, Breast cancer (2008), Depression, Encounter for blood transfusion, Hepatitis (1998), and Thyroid disease.    Surgical History:  has a past surgical history that includes " Hysterectomy (2010); masectomy (2008); Breast reconstruction (2010); Cosmetic surgery; Tonsillectomy; Esophagogastroduodenoscopy (N/A, 04/19/2022); Rotator cuff repair (Right, 11/2021); and Cystoscopy.    Family History: family history includes Arthritis in her maternal aunt and mother; Cancer in her maternal grandmother and paternal grandmother; Heart failure in her father and paternal uncle.    Social History:  reports that she has never smoked. She has never used smokeless tobacco. She reports current alcohol use. She reports that she does not use drugs.    Review of patient's allergies indicates:   Allergen Reactions    Codeine Hives    Gluten Diarrhea    Latex Hives       Prior to Admission medications    Medication Sig Start Date End Date Taking? Authorizing Provider   albuterol (PROVENTIL/VENTOLIN HFA) 90 mcg/actuation inhaler Inhale 2 puffs into the lungs daily as needed.   Yes Historical Provider   alendronate (FOSAMAX) 70 MG tablet Take 1 tablet (70 mg total) by mouth every 7 days. 7/1/21  Yes Artemio Devine MD   ammonium lactate (LAC-HYDRIN) 12 % lotion APPLY TO THE AFFECTED AREA AS NEEDED 11/9/22  Yes Historical Provider   ARIPiprazole (ABILIFY) 2 MG Tab Take 5 mg by mouth once daily.   Yes Historical Provider   arm brace Misc 1 Piece by Misc.(Non-Drug; Combo Route) route once daily. 11/22/21  Yes Les Carson MD   azathioprine (IMURAN) 50 mg Tab Take 25 mg by mouth once daily.   Yes Historical Provider   azaTHIOprine (IMURAN) 50 mg Tab Take 50 mg by mouth. 3/4/22  Yes Historical Provider   azelastine (ASTELIN) 137 mcg (0.1 %) nasal spray 2 sprays once daily. 2/5/19  Yes Historical Provider   azithromycin (Z-EVELIN) 250 MG tablet  11/4/22  Yes Historical Provider   celecoxib (CELEBREX) 200 MG capsule Take 200 mg by mouth once daily.    Yes Historical Provider   cephALEXin (KEFLEX) 250 MG capsule Take 250 mg by mouth 4 (four) times daily. 9/12/22  Yes Historical Provider   ciclopirox 1 % shampoo LATHER  INTO SCALP AND LET SIT FOR 5 MINUTES BEFORE RINSING ONE DAY A WEEK 22  Yes Historical Provider   ciprofloxacin HCl (CIPRO) 500 MG tablet Take 500 mg by mouth 2 (two) times daily. 10/7/22  Yes Historical Provider   diphenoxylate-atropine 2.5-0.025 mg (LOMOTIL) 2.5-0.025 mg per tablet Take 1 tablet by mouth 3 (three) times daily as needed for Diarrhea. 3/3/21  Yes Perry Kiser MD   fluocinonide (LIDEX) 0.05 % external solution APPLY TO THE AFFECTED AREA EVERY DAY AS NEEDED FOR ITCHING 22  Yes Historical Provider   fluoruracil (CARAC) 0.5 % cream Apply topically.   Yes Historical Provider   gabapentin (NEURONTIN) 300 MG capsule Take 300 mg by mouth nightly. 22  Yes Historical Provider   levothyroxine (SYNTHROID) 88 MCG tablet Take 1 tablet (88 mcg total) by mouth before breakfast. 22 Yes Eldon Dennis PA-C   methenamine (HIPREX) 1 gram Tab Take 1 tablet (1 g total) by mouth 2 (two) times daily. 22 Yes Nathanael Pearson DO   mupirocin (BACTROBAN) 2 % ointment Apply to affected area 3 times daily 19  Yes Silvia Felix PA-C   nitrofurantoin, macrocrystal-monohydrate, (MACROBID) 100 MG capsule SMARTSI Capsule(s) By Mouth Morning-Night 10/14/22  Yes Historical Provider   predniSONE (DELTASONE) 20 MG tablet Take 20 mg by mouth once daily. 22  Yes Historical Provider   sumatriptan (IMITREX) 100 MG tablet Take 100 mg by mouth daily as needed. 3/21/22  Yes Historical Provider   thyroid, pork, (ARMOUR THYROID) 90 mg Tab Take 60 mg by mouth.   Yes Historical Provider   tizanidine (ZANAFLEX) 4 MG tablet Take 4 mg by mouth nightly as needed.    Yes Historical Provider   traZODone (DESYREL) 100 MG tablet Take 100 mg by mouth nightly. 22  Yes Historical Provider   triamcinolone acetonide 0.1% (KENALOG) 0.1 % ointment APPLY EXTERNALLY TO THE AFFECTED AREA EVERY DAY FOR 7 DAYS 22  Yes Historical Provider   valACYclovir (VALTREX) 500 MG tablet TAKE 1  "TABLET BY MOUTH EVERY DAY 9/18/22  Yes Perry Kiser MD   venlafaxine (EFFEXOR) 37.5 MG Tab Take 37.5 mg by mouth.   Yes Historical Provider   venlafaxine (EFFEXOR) 75 MG tablet Take 75 mg by mouth once daily.   Yes Historical Provider   venlafaxine (EFFEXOR) 75 MG tablet Take 37.5 mg by mouth.   Yes Historical Provider   meloxicam (MOBIC) 15 MG tablet Take 1 tablet by mouth once daily. 2/1/18   Historical Provider   HYDROcodone-acetaminophen (NORCO) 5-325 mg per tablet Take 1 tablet by mouth 3 (three) times daily as needed. 12/1/21 2/1/23  Historical Provider       Objective Findings:  Vital Signs:  Ht 5' 8" (1.727 m)   Wt 58.2 kg (128 lb 4.9 oz)   BMI 19.51 kg/m²   Body mass index is 19.51 kg/m².      Physical Exam:  General Appearance:  Well appearing in no acute distress, appears stated age  Head:  Normocephalic, atraumatic  Eyes:  No scleral icterus or pallor, EOMI  Abdomen:  Soft, tenderness over entire abdomen to deep palpation, non distended. No hepatosplenomegaly, ascites, or mass  Extremities:  No clubbing, cyanosis, or edema  Skin:  No rash  Neurologic:  AAO x 4; CN II-XII intact          Labs:  Lab Results   Component Value Date    WBC 2.68 (L) 04/07/2022    HGB 13.4 04/07/2022    HCT 40.5 04/07/2022     (H) 04/07/2022    RDW 12.3 04/07/2022     04/07/2022    GRAN 2.3 11/27/2020    GRAN 64.7 11/27/2020    LYMPH 0.6 (L) 11/27/2020    LYMPH 17.4 (L) 11/27/2020    MONO 0.5 11/27/2020    MONO 14.3 11/27/2020    EOS 0.1 11/27/2020    BASO 0.03 11/27/2020     Lab Results   Component Value Date     10/06/2022    K 3.7 10/06/2022     10/06/2022    CO2 27 10/06/2022    GLU 98 10/06/2022    BUN 13 10/06/2022    CREATININE 0.6 10/06/2022    CALCIUM 9.4 10/06/2022    PROT 6.4 10/06/2022    ALBUMIN 3.9 10/06/2022    BILITOT 0.5 10/06/2022    ALKPHOS 61 10/06/2022    AST 18 10/06/2022    ALT 12 10/06/2022     Tissue transglutaminase IgA level normal on 08/29/2011    TSH 0.021 on " 11/18/2022 with a normal free T4                Imaging:  Ultrasound of the liver 11/27/2020 was within normal limits                Assessment:  Nuris Cm is a 62 y.o. female with:  1. Alternating constipation and diarrhea    2. Dyspepsia    3. Bloating    4. Weight loss    5. Schatzki's ring    6. Hiatal hernia    7. Intermittent dysphagia    8. Autoimmune hepatitis    9. Hypothyroidism due to Hashimoto's thyroiditis    10. Colon cancer screening      She generally describes not feeling well, and having an upset stomach and problems with her GI tract.  She reports having an intolerance to wheat products, but no past diagnosis of celiac disease.  She had recent antibiotics, NSAID use and narcotic pain medication use which could have affected the GI tract.  She has a longstanding history of diarrhea with intermittent episodes of constipation.  Diarrhea seems more predominant to her.  EGD in April of last year revealed a widely patent Schatzki's ring.  She rarely has heartburn symptoms.  She is not on regular medications for reflux or indigestion.  I would not expect that Schatzki's ring to cause symptoms; however, she did improve after dilation of the ring with a 20 mm TTS balloon.  She has not had a colonoscopy since August of 2011.    She is followed regularly by hepatology at [facility] for autoimmune hepatitis.  She is on therapy with Imuran.      Recommendations/Plan:  1. I recommend blood in stool testing as noted below.  Will rule out infectious etiology including C diff. I will check her thyroid levels.  We will check inflammatory markers.  I will screen her for celiac disease, and rule out H pylori.  2. I will arrange for an EGD to help assess symptoms, and a colonoscopy for screening purposes, but which will also help in evaluating symptoms.      Follow-up pending results of above.      Order summary:  Orders Placed This Encounter    Clostridium difficile EIA    CBC Auto Differential    Comprehensive  Metabolic Panel    TSH    Lipase    C-reactive protein    ESR (SEDIMENTATION RATE, MANUAL)    T4, Free    Celiac Disease Panel    Calprotectin, Stool    Giardia / Cryptosporidum, EIA    Stool Exam-Ova,Cysts,Parasites    H. pylori antigen, stool    Ambulatory referral/consult to Endo Procedure          Thank you so much for allowing me to participate in the care of Nuris Cm        John Pan MD      Visit time of over an hour with extensive record review, discussion of history with patient, and reviewing further evaluation.

## 2023-02-03 LAB
GLIADIN PEPTIDE IGA SER-ACNC: 2.4 U/ML
GLIADIN PEPTIDE IGG SER-ACNC: <0.6 U/ML
IGA SERPL-MCNC: 174 MG/DL (ref 70–400)
TTG IGA SER-ACNC: 0.7 U/ML
TTG IGG SER-ACNC: <0.6 U/ML

## 2023-02-14 ENCOUNTER — PATIENT MESSAGE (OUTPATIENT)
Dept: ENDOCRINOLOGY | Facility: CLINIC | Age: 63
End: 2023-02-14
Payer: COMMERCIAL

## 2023-03-14 ENCOUNTER — ANESTHESIA EVENT (OUTPATIENT)
Dept: ENDOSCOPY | Facility: HOSPITAL | Age: 63
End: 2023-03-14
Payer: COMMERCIAL

## 2023-03-14 ENCOUNTER — ANESTHESIA (OUTPATIENT)
Dept: ENDOSCOPY | Facility: HOSPITAL | Age: 63
End: 2023-03-14
Payer: COMMERCIAL

## 2023-03-14 ENCOUNTER — HOSPITAL ENCOUNTER (OUTPATIENT)
Facility: HOSPITAL | Age: 63
Discharge: HOME | End: 2023-03-14
Attending: INTERNAL MEDICINE | Admitting: INTERNAL MEDICINE
Payer: COMMERCIAL

## 2023-03-14 VITALS
DIASTOLIC BLOOD PRESSURE: 60 MMHG | OXYGEN SATURATION: 99 % | HEIGHT: 68 IN | BODY MASS INDEX: 18.64 KG/M2 | SYSTOLIC BLOOD PRESSURE: 126 MMHG | RESPIRATION RATE: 16 BRPM | WEIGHT: 123 LBS | HEART RATE: 65 BPM | TEMPERATURE: 98 F

## 2023-03-14 DIAGNOSIS — K21.9 GASTROESOPHAGEAL REFLUX DISEASE, UNSPECIFIED WHETHER ESOPHAGITIS PRESENT: ICD-10-CM

## 2023-03-14 DIAGNOSIS — K21.9 GERD (GASTROESOPHAGEAL REFLUX DISEASE): ICD-10-CM

## 2023-03-14 DIAGNOSIS — R19.7 DIARRHEA, UNSPECIFIED TYPE: Primary | ICD-10-CM

## 2023-03-14 PROCEDURE — 43239 PR EGD, FLEX, W/BIOPSY, SGL/MULTI: ICD-10-PCS | Mod: 51,,, | Performed by: INTERNAL MEDICINE

## 2023-03-14 PROCEDURE — 88305 TISSUE EXAM BY PATHOLOGIST: ICD-10-PCS | Mod: 26,,, | Performed by: PATHOLOGY

## 2023-03-14 PROCEDURE — E9220 PRA ENDO ANESTHESIA: ICD-10-PCS | Mod: ,,, | Performed by: NURSE ANESTHETIST, CERTIFIED REGISTERED

## 2023-03-14 PROCEDURE — 27201012 HC FORCEPS, HOT/COLD, DISP: Performed by: INTERNAL MEDICINE

## 2023-03-14 PROCEDURE — 25000003 PHARM REV CODE 250: Performed by: NURSE ANESTHETIST, CERTIFIED REGISTERED

## 2023-03-14 PROCEDURE — 63600175 PHARM REV CODE 636 W HCPCS: Performed by: NURSE ANESTHETIST, CERTIFIED REGISTERED

## 2023-03-14 PROCEDURE — 43239 EGD BIOPSY SINGLE/MULTIPLE: CPT | Mod: 51,,, | Performed by: INTERNAL MEDICINE

## 2023-03-14 PROCEDURE — 88305 TISSUE EXAM BY PATHOLOGIST: CPT | Mod: 26,,, | Performed by: PATHOLOGY

## 2023-03-14 PROCEDURE — E9220 PRA ENDO ANESTHESIA: HCPCS | Mod: ,,, | Performed by: NURSE ANESTHETIST, CERTIFIED REGISTERED

## 2023-03-14 PROCEDURE — 45380 COLONOSCOPY AND BIOPSY: CPT | Mod: GC | Performed by: INTERNAL MEDICINE

## 2023-03-14 PROCEDURE — 37000008 HC ANESTHESIA 1ST 15 MINUTES: Performed by: INTERNAL MEDICINE

## 2023-03-14 PROCEDURE — 45380 COLONOSCOPY AND BIOPSY: CPT | Mod: ,,, | Performed by: INTERNAL MEDICINE

## 2023-03-14 PROCEDURE — 37000009 HC ANESTHESIA EA ADD 15 MINS: Performed by: INTERNAL MEDICINE

## 2023-03-14 PROCEDURE — 82657 ENZYME CELL ACTIVITY: CPT | Performed by: PATHOLOGY

## 2023-03-14 PROCEDURE — 45380 PR COLONOSCOPY,BIOPSY: ICD-10-PCS | Mod: ,,, | Performed by: INTERNAL MEDICINE

## 2023-03-14 PROCEDURE — 88305 TISSUE EXAM BY PATHOLOGIST: CPT | Performed by: PATHOLOGY

## 2023-03-14 PROCEDURE — 43239 EGD BIOPSY SINGLE/MULTIPLE: CPT | Mod: GC | Performed by: INTERNAL MEDICINE

## 2023-03-14 RX ORDER — PROPOFOL 10 MG/ML
VIAL (ML) INTRAVENOUS
Status: DISCONTINUED | OUTPATIENT
Start: 2023-03-14 | End: 2023-03-14

## 2023-03-14 RX ORDER — SODIUM CHLORIDE 9 MG/ML
INJECTION, SOLUTION INTRAVENOUS CONTINUOUS
Status: DISCONTINUED | OUTPATIENT
Start: 2023-03-14 | End: 2023-03-14 | Stop reason: HOSPADM

## 2023-03-14 RX ORDER — LIDOCAINE HYDROCHLORIDE 20 MG/ML
INJECTION INTRAVENOUS
Status: DISCONTINUED | OUTPATIENT
Start: 2023-03-14 | End: 2023-03-14

## 2023-03-14 RX ORDER — PROPOFOL 10 MG/ML
VIAL (ML) INTRAVENOUS CONTINUOUS PRN
Status: DISCONTINUED | OUTPATIENT
Start: 2023-03-14 | End: 2023-03-14

## 2023-03-14 RX ADMIN — GLYCOPYRROLATE 0.1 MG: 0.2 INJECTION, SOLUTION INTRAMUSCULAR; INTRAVENOUS at 02:03

## 2023-03-14 RX ADMIN — SODIUM CHLORIDE: 9 INJECTION, SOLUTION INTRAVENOUS at 02:03

## 2023-03-14 RX ADMIN — LIDOCAINE HYDROCHLORIDE 80 MG: 20 INJECTION INTRAVENOUS at 02:03

## 2023-03-14 RX ADMIN — Medication 60 MG: at 02:03

## 2023-03-14 RX ADMIN — PROPOFOL 150 MCG/KG/MIN: 10 INJECTION, EMULSION INTRAVENOUS at 02:03

## 2023-03-14 NOTE — [employer]ATION PATIENT INSTRUCTIONS
Discharge Summary/Instructions after an Endoscopic Procedure  Patient Name: Nuris Cm  Patient MRN: [id number]  Patient YOB: 1960  Tuesday, March 14, 2023  John Pan MD  Dear patient,  As a result of recent federal legislation (The Federal Cures Act), you may   receive lab or pathology results from your procedure in your [facility]   account before your physician is able to contact you. Your physician or   their representative will relay the results to you with their   recommendations at their soonest availability.  Thank you,  RESTRICTIONS:  During your procedure today, you received medications for sedation.  These   medications may affect your judgment, balance and coordination.  Therefore,   for 24 hours, you have the following restrictions:   - DO NOT drive a car, operate machinery, make legal/financial decisions,   sign important papers or drink alcohol.    ACTIVITY:  Today: no heavy lifting, straining or running due to procedural   sedation/anesthesia.  The following day: return to full activity including work.  DIET:  Eat and drink normally unless instructed otherwise.     TREATMENT FOR COMMON SIDE EFFECTS:  - Mild abdominal pain, nausea, belching, bloating or excessive gas:  rest,   eat lightly and use a heating pad.  - Sore Throat: treat with throat lozenges and/or gargle with warm salt   water.  - Because air was used during the procedure, expelling large amounts of air   from your rectum or belching is normal.  - If a bowel prep was taken, you may not have a bowel movement for 1-3 days.    This is normal.  SYMPTOMS TO WATCH FOR AND REPORT TO YOUR PHYSICIAN:  1. Abdominal pain or bloating, other than gas cramps.  2. Chest pain.  3. Back pain.  4. Signs of infection such as: chills or fever occurring within 24 hours   after the procedure.  5. Rectal bleeding, which would show as bright red, maroon, or black stools.   (A tablespoon of blood from the rectum is not serious, especially if    hemorrhoids are present.)  6. Vomiting.  7. Weakness or dizziness.  GO DIRECTLY TO THE NEAREST EMERGENCY ROOM IF YOU HAVE ANY OF THE FOLLOWING:      Difficulty breathing              Chills and/or fever over 101 F   Persistent vomiting and/or vomiting blood   Severe abdominal pain   Severe chest pain   Black, tarry stools   Bleeding- more than one tablespoon   Any other symptom or condition that you feel may need urgent attention  Your doctor recommends these additional instructions:  If any biopsies were taken, your doctors clinic will contact you in 1 to 2   weeks with any results.  - Await pathology results.   - Consider esophageal spasms as a cause of symptoms.   - Perform a colonoscopy now.   - See colonoscopy report for further recommendations.  For questions, problems or results please call your physician - John Pan MD at Work:  [phone].  [facility], EMERGENCY ROOM PHONE NUMBER: [phone]  IF A COMPLICATION OR EMERGENCY SITUATION ARISES AND YOU ARE UNABLE TO REACH   YOUR PHYSICIAN - GO DIRECTLY TO THE EMERGENCY ROOM.  John Pan MD  3/14/2023 2:49:52 PM  This report has been verified and signed electronically.  Dear patient,  As a result of recent federal legislation (The Federal Cures Act), you may   receive lab or pathology results from your procedure in your [facility]   account before your physician is able to contact you. Your physician or   their representative will relay the results to you with their   recommendations at their soonest availability.  Thank you,  PROVATION

## 2023-03-14 NOTE — H&P
Short Stay Endoscopy History and Physical    PCP - Primary Doctor No    Procedure - EGD and colonoscopy  ASA - per anesthesia  Mallampati - per anesthesia  History of Anesthesia problems - no  Family history Anesthesia problems -  no   Plan of anesthesia - General    HPI:  This is a 62 y.o. female here for evaluation of :  diarrhea, reported history of celiac, and intermittent solid food dysphagia    ROS:  Constitutional: No fevers, chills  CV: No chest pain  Pulm: No shortness of breath  GI: see HPI  Derm: No rash    Medical History:  has a past medical history of Arthritis, Asthma, Breast cancer (2008), Depression, Encounter for blood transfusion, Hepatitis (1998), and Thyroid disease.    Surgical History:  has a past surgical history that includes Hysterectomy (2010); masectomy (2008); Breast reconstruction (2010); Cosmetic surgery; Tonsillectomy; Esophagogastroduodenoscopy (N/A, 04/19/2022); Rotator cuff repair (Right, 11/2021); and Cystoscopy.    Family History: family history includes Arthritis in her maternal aunt and mother; Cancer in her maternal grandmother and paternal grandmother; Heart failure in her father and paternal uncle.. Otherwise no colon cancer, inflammatory bowel disease, or GI malignancies.    Social History:  reports that she has never smoked. She has never used smokeless tobacco. She reports current alcohol use. She reports that she does not use drugs.    Review of patient's allergies indicates:   Allergen Reactions    Codeine Hives    Gluten Diarrhea    Latex Hives       Medications:   Medications Prior to Admission   Medication Sig Dispense Refill Last Dose    albuterol (PROVENTIL/VENTOLIN HFA) 90 mcg/actuation inhaler Inhale 2 puffs into the lungs daily as needed.   Past Month    alendronate (FOSAMAX) 70 MG tablet Take 1 tablet (70 mg total) by mouth every 7 days. 12 tablet 3 3/13/2023    ammonium lactate (LAC-HYDRIN) 12 % lotion APPLY TO THE AFFECTED AREA AS NEEDED   3/13/2023     ARIPiprazole (ABILIFY) 2 MG Tab Take 5 mg by mouth once daily.   3/13/2023    azathioprine (IMURAN) 50 mg Tab Take 25 mg by mouth once daily.   3/13/2023    azaTHIOprine (IMURAN) 50 mg Tab Take 50 mg by mouth.   3/13/2023    azelastine (ASTELIN) 137 mcg (0.1 %) nasal spray 2 sprays once daily.   Past Week    azithromycin (Z-EVELIN) 250 MG tablet    Past Month    celecoxib (CELEBREX) 200 MG capsule Take 200 mg by mouth once daily.    3/13/2023    cephALEXin (KEFLEX) 250 MG capsule Take 250 mg by mouth 4 (four) times daily.   Past Month    ciclopirox 1 % shampoo LATHER INTO SCALP AND LET SIT FOR 5 MINUTES BEFORE RINSING ONE DAY A WEEK   Past Month    ciprofloxacin HCl (CIPRO) 500 MG tablet Take 500 mg by mouth 2 (two) times daily.   Past Month    diphenoxylate-atropine 2.5-0.025 mg (LOMOTIL) 2.5-0.025 mg per tablet Take 1 tablet by mouth 3 (three) times daily as needed for Diarrhea. 40 tablet 3 Past Month    fluocinonide (LIDEX) 0.05 % external solution APPLY TO THE AFFECTED AREA EVERY DAY AS NEEDED FOR ITCHING   Past Month    fluoruracil (CARAC) 0.5 % cream Apply topically.   Past Month    gabapentin (NEURONTIN) 300 MG capsule Take 300 mg by mouth nightly.   3/13/2023    levothyroxine (SYNTHROID) 88 MCG tablet Take 1 tablet (88 mcg total) by mouth before breakfast. 30 tablet 11 3/13/2023    meloxicam (MOBIC) 15 MG tablet Take 1 tablet by mouth once daily.  3 Past Week    mupirocin (BACTROBAN) 2 % ointment Apply to affected area 3 times daily 22 g 1 Past Month    nitrofurantoin, macrocrystal-monohydrate, (MACROBID) 100 MG capsule SMARTSI Capsule(s) By Mouth Morning-Night   Past Month    predniSONE (DELTASONE) 20 MG tablet Take 20 mg by mouth once daily.   Past Week    sumatriptan (IMITREX) 100 MG tablet Take 100 mg by mouth daily as needed.   Past Week    thyroid, pork, (ARMOUR THYROID) 90 mg Tab Take 60 mg by mouth.   Past Week    traZODone (DESYREL) 100 MG tablet Take 100 mg by mouth nightly.   Past Week     triamcinolone acetonide 0.1% (KENALOG) 0.1 % ointment APPLY EXTERNALLY TO THE AFFECTED AREA EVERY DAY FOR 7 DAYS   Past Week    valACYclovir (VALTREX) 500 MG tablet TAKE 1 TABLET BY MOUTH EVERY DAY 90 tablet 3 Past Week    venlafaxine (EFFEXOR) 37.5 MG Tab Take 37.5 mg by mouth.   Past Week    venlafaxine (EFFEXOR) 75 MG tablet Take 75 mg by mouth once daily.   Past Week    venlafaxine (EFFEXOR) 75 MG tablet Take 37.5 mg by mouth.   Past Week    arm brace Misc 1 Piece by Misc.(Non-Drug; Combo Route) route once daily.   More than a month    tizanidine (ZANAFLEX) 4 MG tablet Take 4 mg by mouth nightly as needed.             Physical Exam:    Vital Signs:   Vitals:    03/14/23 1345   BP: (!) 116/54   Pulse: 76   Resp: 15   Temp: 96.8 °F (36 °C)       General Appearance: Well appearing in no acute distress  Eyes:    No scleral icterus  ENT: lips and tongue normal  Lungs: no use of accessory muscles  Heart:  normal rate, regular rhythm  Abdomen: Soft, non tender, non distended   Extremities: no edema  Skin: No rash      Labs:  Lab Results   Component Value Date    WBC 3.30 (L) 02/01/2023    HGB 13.2 02/01/2023    HCT 39.7 02/01/2023     02/01/2023    CHOL 176 04/07/2022    TRIG 71 04/07/2022    HDL 56 04/07/2022    ALT 8 (L) 02/01/2023    AST 18 02/01/2023     02/01/2023    K 3.7 02/01/2023     02/01/2023    CREATININE 0.7 02/01/2023    BUN 9 02/01/2023    CO2 27 02/01/2023    TSH 0.029 (L) 02/01/2023    INR 0.9 03/21/2007    HGBA1C 4.8 04/07/2022       I have explained the risks and benefits of endoscopy procedures to the patient including but not limited to bleeding, perforation, infection, and death.  The patient was asked if they understand and allowed to ask any further questions to their satisfaction.    Proceed with EGD and colonoscopy    Ayaz Tobias MD

## 2023-03-14 NOTE — PROVATION PATIENT INSTRUCTIONS
Discharge Summary/Instructions after an Endoscopic Procedure  Patient Name: Nuris Cm  Patient MRN: [id number]  Patient YOB: 1960  Tuesday, March 14, 2023  John Pan MD  Dear patient,  As a result of recent federal legislation (The Federal Cures Act), you may   receive lab or pathology results from your procedure in your [facility]   account before your physician is able to contact you. Your physician or   their representative will relay the results to you with their   recommendations at their soonest availability.  Thank you,  RESTRICTIONS:  During your procedure today, you received medications for sedation.  These   medications may affect your judgment, balance and coordination.  Therefore,   for 24 hours, you have the following restrictions:   - DO NOT drive a car, operate machinery, make legal/financial decisions,   sign important papers or drink alcohol.    ACTIVITY:  Today: no heavy lifting, straining or running due to procedural   sedation/anesthesia.  The following day: return to full activity including work.  DIET:  Eat and drink normally unless instructed otherwise.     TREATMENT FOR COMMON SIDE EFFECTS:  - Mild abdominal pain, nausea, belching, bloating or excessive gas:  rest,   eat lightly and use a heating pad.  - Sore Throat: treat with throat lozenges and/or gargle with warm salt   water.  - Because air was used during the procedure, expelling large amounts of air   from your rectum or belching is normal.  - If a bowel prep was taken, you may not have a bowel movement for 1-3 days.    This is normal.  SYMPTOMS TO WATCH FOR AND REPORT TO YOUR PHYSICIAN:  1. Abdominal pain or bloating, other than gas cramps.  2. Chest pain.  3. Back pain.  4. Signs of infection such as: chills or fever occurring within 24 hours   after the procedure.  5. Rectal bleeding, which would show as bright red, maroon, or black stools.   (A tablespoon of blood from the rectum is not serious, especially if    hemorrhoids are present.)  6. Vomiting.  7. Weakness or dizziness.  GO DIRECTLY TO THE NEAREST EMERGENCY ROOM IF YOU HAVE ANY OF THE FOLLOWING:      Difficulty breathing              Chills and/or fever over 101 F   Persistent vomiting and/or vomiting blood   Severe abdominal pain   Severe chest pain   Black, tarry stools   Bleeding- more than one tablespoon   Any other symptom or condition that you feel may need urgent attention  Your doctor recommends these additional instructions:  If any biopsies were taken, your doctors clinic will contact you in 1 to 2   weeks with any results.  - Discharge patient to home.   - Patient has a contact number available for emergencies.  The signs and   symptoms of potential delayed complications were discussed with the   patient.  Return to normal activities tomorrow.  Written discharge   instructions were provided to the patient.   - Resume previous diet.   - Continue present medications.   - Await pathology results.   - Repeat colonoscopy in 10 years for screening purposes.  For questions, problems or results please call your physician - John Pan MD at Work:  [phone].  [facility], EMERGENCY ROOM PHONE NUMBER: [phone]  IF A COMPLICATION OR EMERGENCY SITUATION ARISES AND YOU ARE UNABLE TO REACH   YOUR PHYSICIAN - GO DIRECTLY TO THE EMERGENCY ROOM.  John Pan MD  3/14/2023 3:15:23 PM  This report has been verified and signed electronically.  Dear patient,  As a result of recent federal legislation (The Federal Cures Act), you may   receive lab or pathology results from your procedure in your [facility]   account before your physician is able to contact you. Your physician or   their representative will relay the results to you with their   recommendations at their soonest availability.  Thank you,  PROVATION

## 2023-03-14 NOTE — ANESTHESIA PREPROCEDURE EVALUATION
03/14/2023  Nuris Cm is a 62 y.o., female  Past Medical History:   Diagnosis Date    Arthritis     Asthma     Breast cancer 2008    double mastecomy    Depression     Encounter for blood transfusion     Hepatitis 1998    autoimmune    Thyroid disease      Past Surgical History:   Procedure Laterality Date    BREAST RECONSTRUCTION  2010    COSMETIC SURGERY      CYSTOSCOPY      ESOPHAGOGASTRODUODENOSCOPY N/A 04/19/2022    Procedure: ESOPHAGOGASTRODUODENOSCOPY (EGD);  Surgeon: Alexa Odell MD;  Location: [address];  Service: Endoscopy;  Laterality: N/A;  fully vaccinated, instructions sent to [id number]    HYSTERECTOMY  2010    masectomy  2008    ROTATOR CUFF REPAIR Right 11/2021    TONSILLECTOMY                 hPre-op Assessment    I have reviewed the Patient Summary Reports.     I have reviewed the Nursing Notes. I have reviewed the NPO Status.   I have reviewed the Medications.     Review of Systems  Anesthesia Hx:  No problems with previous Anesthesia  Denies Family Hx of Anesthesia complications.   Denies Personal Hx of Anesthesia complications.   Hematology/Oncology:  Hematology Normal   Oncology Normal     EENT/Dental:EENT/Dental Normal   Cardiovascular:  Cardiovascular Normal     Pulmonary:   Asthma    Renal/:  Renal/ Normal     Hepatic/GI:   Bowel Prep. Liver Disease, Hepatitis    Musculoskeletal:  Musculoskeletal Normal    Neurological:  Neurology Normal    Endocrine:   Hypothyroidism    Dermatological:  Skin Normal    Psych:   Psychiatric History          Physical Exam  General: Well nourished    Airway:  Mallampati: II   Mouth Opening: Normal  TM Distance: Normal  Tongue: Normal  Neck ROM: Normal ROM    Dental:  Intact        Anesthesia Plan  Type of Anesthesia, risks & benefits discussed:    Anesthesia Type: Gen Natural Airway  Intra-op Monitoring Plan:  Standard ASA Monitors  Informed Consent: Informed consent signed with the Patient and all parties understand the risks and agree with anesthesia plan.  All questions answered.   ASA Score: 2  Day of Surgery Review of History & Physical: H&P Update referred to the surgeon/provider.I have interviewed and examined the patient. I have reviewed the patient's H&P dated: There are no significant changes.     Ready For Surgery From Anesthesia Perspective.     .

## 2023-03-20 LAB
FINAL PATHOLOGIC DIAGNOSIS: NORMAL
FINAL PATHOLOGIC DIAGNOSIS: NORMAL
GROSS: NORMAL
Lab: NORMAL
Lab: NORMAL

## 2023-03-21 NOTE — ANESTHESIA POSTPROCEDURE EVALUATION
Anesthesia Post Evaluation    Patient: Nuris Cm    Procedure(s) Performed: Procedure(s) (LRB):  EGD (ESOPHAGOGASTRODUODENOSCOPY) (N/A)  COLONOSCOPY (N/A)    Final Anesthesia Type: general      Patient location during evaluation: GI PACU  Patient participation: Yes- Able to Participate  Level of consciousness: awake and alert  Post-procedure vital signs: reviewed and stable  Pain management: adequate  Airway patency: patent    PONV status at discharge: No PONV  Anesthetic complications: no      Cardiovascular status: stable  Respiratory status: unassisted and spontaneous ventilation  Hydration status: euvolemic  Follow-up not needed.          Vitals Value Taken Time   /60 03/14/23 1547   Temp 36.6 °C (97.8 °F) 03/14/23 1520   Pulse 65 03/14/23 1547   Resp 16 03/14/23 1547   SpO2 99 % 03/14/23 1547         Event Time   Out of Recovery 15:56:44         Pain/Dixie Score: No data recorded

## 2023-12-14 DIAGNOSIS — E03.8 HYPOTHYROIDISM DUE TO HASHIMOTO'S THYROIDITIS: ICD-10-CM

## 2023-12-14 DIAGNOSIS — E06.3 HYPOTHYROIDISM DUE TO HASHIMOTO'S THYROIDITIS: ICD-10-CM

## 2023-12-14 DIAGNOSIS — M81.0 OSTEOPOROSIS, UNSPECIFIED OSTEOPOROSIS TYPE, UNSPECIFIED PATHOLOGICAL FRACTURE PRESENCE: Primary | ICD-10-CM

## 2024-04-02 ENCOUNTER — HOSPITAL ENCOUNTER (OUTPATIENT)
Dept: RADIOLOGY | Facility: CLINIC | Age: 64
Discharge: HOME | End: 2024-04-02
Attending: INTERNAL MEDICINE
Payer: COMMERCIAL

## 2024-04-02 DIAGNOSIS — M81.0 OSTEOPOROSIS, UNSPECIFIED OSTEOPOROSIS TYPE, UNSPECIFIED PATHOLOGICAL FRACTURE PRESENCE: ICD-10-CM

## 2024-04-02 PROCEDURE — 77080 DXA BONE DENSITY AXIAL: CPT | Mod: TC

## 2024-04-02 PROCEDURE — 77080 DXA BONE DENSITY AXIAL: CPT | Mod: 26,,, | Performed by: INTERNAL MEDICINE

## 2024-04-09 ENCOUNTER — OFFICE VISIT (OUTPATIENT)
Dept: ENDOCRINOLOGY | Facility: CLINIC | Age: 64
End: 2024-04-09
Payer: COMMERCIAL

## 2024-04-09 DIAGNOSIS — E03.8 HYPOTHYROIDISM DUE TO HASHIMOTO'S THYROIDITIS: Primary | ICD-10-CM

## 2024-04-09 DIAGNOSIS — M81.0 OSTEOPOROSIS, UNSPECIFIED OSTEOPOROSIS TYPE, UNSPECIFIED PATHOLOGICAL FRACTURE PRESENCE: ICD-10-CM

## 2024-04-09 DIAGNOSIS — K75.4 AUTOIMMUNE HEPATITIS: ICD-10-CM

## 2024-04-09 DIAGNOSIS — B00.9 HERPES: ICD-10-CM

## 2024-04-09 DIAGNOSIS — E06.3 HYPOTHYROIDISM DUE TO HASHIMOTO'S THYROIDITIS: Primary | ICD-10-CM

## 2024-04-09 PROCEDURE — 1160F RVW MEDS BY RX/DR IN RCRD: CPT | Mod: CPTII,95,, | Performed by: INTERNAL MEDICINE

## 2024-04-09 PROCEDURE — G2211 COMPLEX E/M VISIT ADD ON: HCPCS | Mod: 95,,, | Performed by: INTERNAL MEDICINE

## 2024-04-09 PROCEDURE — 1159F MED LIST DOCD IN RCRD: CPT | Mod: CPTII,95,, | Performed by: INTERNAL MEDICINE

## 2024-04-09 PROCEDURE — 99214 OFFICE O/P EST MOD 30 MIN: CPT | Mod: 95,,, | Performed by: INTERNAL MEDICINE

## 2024-04-09 RX ORDER — LEVOTHYROXINE SODIUM 75 UG/1
75 TABLET ORAL
Qty: 90 TABLET | Refills: 3 | Status: SHIPPED | OUTPATIENT
Start: 2024-04-09 | End: 2025-04-09

## 2024-04-09 RX ORDER — VALACYCLOVIR HYDROCHLORIDE 500 MG/1
500 TABLET, FILM COATED ORAL DAILY
Qty: 90 TABLET | Refills: 3 | Status: SHIPPED | OUTPATIENT
Start: 2024-04-09

## 2024-04-09 NOTE — ASSESSMENT & PLAN NOTE
--Patient with hypothyroidism  --Previously on [place] thyroid, now on synthroid.  Most recent TSH normal  --Continue levothyroxine 75 mcg once daily

## 2024-04-09 NOTE — ASSESSMENT & PLAN NOTE
--Patient with osteoporosis  --No history of fracture  --On drug holdiay since 8/2021  --Now with significant decline one recent BMD in 4/2024  --Reviewed options and she is amenable to Prolia every 6 months  --Reviewed possible side effects of Prolia and reviewed importance of not having any significant delays in receiving Prolia which can cause rebound bone loss    --Start Prolia every 6 months  --Continue Vitamin D supplement  --Repeat DXA 4/2026

## 2024-04-09 NOTE — PROGRESS NOTES
Subjective:      Patient ID: Nuris Cm is a 63 y.o. female.    Chief Complaint:  Osteoporosis and Hypothyroidism    The patient location is: Home  The chief complaint leading to consultation is: Hypothyroidism, osteoporosis    Visit type: audiovisual    Face to Face time with patient: 12 min  20 minutes of total time spent on the encounter, which includes face to face time and non-face to face time preparing to see the patient (eg, review of tests), Obtaining and/or reviewing separately obtained history, Documenting clinical information in the electronic or other health record, Independently interpreting results (not separately reported) and communicating results to the patient/family/caregiver, or Care coordination (not separately reported).     Each patient to whom he or she provides medical services by telemedicine is:  (1) informed of the relationship between the physician and patient and the respective role of any other health care provider with respect to management of the patient; and (2) notified that he or she may decline to receive medical services by telemedicine and may withdraw from such care at any time.      History of Present Illness  Ms. Cm presents for follow up of Hashimoto's thyroid disease and osteoporosis.   Last visit  10/2022.     Has active history of autoimmune hepatitis treated with Imuran, breast CA (BRCA 1 positive) in remission and likely Celiacs disease on gluten free diet.      Currently on levothyroxine 75 mcg once daily. Dose was decreased a few months ago from 88 mcg by primary care.   Denies palpitations or tremor.  Feels cold most of the time.   Has chronic fatigue which has been stable.     Latest Reference Range & Units 04/02/24 10:57   TSH 0.400 - 4.000 uIU/mL 2.449        Regarding Osteoporosis  Previously On Fosamax starting 11/2016 stopped August 2021.On drug holiday.      Reports fall a few years ago where she hit head without fracture.  Recent fall off ladder  causing rotator cuff injury requiring surgery.       Not on calcium supplement.  Takes vitamin D supplements.       Bone density dated 4/2024:  COMPARISON:  Comparison study done on 08/06/2021.     Lumbar spine:     BMD 0.964 g/cm2 and T-score -0.8.     Total Hip:           BMD 0.729 g/cm2 and T-score -1.7.     Distal 1/3 radius: Not applicable     FINDINGS:  Lumbar spine (L1-L4):               BMD is 0.925 g/cm2, T-score is -1.1, and Z-score is 0.7.     Total hip:                                BMD is 0.637 g/cm2, T-score is -2.5, and Z-score is -1.3.     Femoral neck:                          BMD is 0.499 g/cm2, T-score is -3.2, and Z-score is -1.6.     Distal 1/3 radius:                      Not applicable     FRAX:     24% risk of a major osteoporotic fracture in the next 10 years.     5% risk of hip fracture in the next 10 years.    Review of Systems   Constitutional:  Negative for chills and fever.   Gastrointestinal:  Negative for nausea.       Objective:   Physical Exam  Constitutional:       General: She is not in acute distress.     Appearance: Normal appearance. She is not ill-appearing.   Neurological:      Mental Status: She is alert.       BP Readings from Last 3 Encounters:   03/14/23 126/60   01/25/23 111/60   12/22/22 (!) 131/58     Wt Readings from Last 1 Encounters:   03/14/23 1345 55.8 kg (123 lb)       There is no height or weight on file to calculate BMI.    Lab Review:   Lab Results   Component Value Date    HGBA1C 4.8 04/07/2022     Lab Results   Component Value Date    CHOL 176 04/07/2022    HDL 56 04/07/2022    LDLCALC 105.8 04/07/2022    TRIG 71 04/07/2022    CHOLHDL 31.8 04/07/2022     Lab Results   Component Value Date     02/01/2023    K 3.7 02/01/2023     02/01/2023    CO2 27 02/01/2023    GLU 91 02/01/2023    BUN 9 02/01/2023    CREATININE 0.7 02/01/2023    CALCIUM 9.5 02/01/2023    PROT 6.7 02/01/2023    ALBUMIN 4.0 02/01/2023    BILITOT 0.6 02/01/2023    ALKPHOS 62  02/01/2023    AST 18 02/01/2023    ALT 8 (L) 02/01/2023    ANIONGAP 8 02/01/2023    ESTGFRAFRICA >60.0 04/07/2022    EGFRNONAA >60.0 04/07/2022    TSH 2.449 04/02/2024         Assessment and Plan     Hypothyroidism due to Hashimoto's thyroiditis  --Patient with hypothyroidism  --Previously on [place] thyroid, now on synthroid.  Most recent TSH normal  --Continue levothyroxine 75 mcg once daily    Osteoporosis  --Patient with osteoporosis  --No history of fracture  --On drug holdiay since 8/2021  --Now with significant decline one recent BMD in 4/2024  --Reviewed options and she is amenable to Prolia every 6 months  --Reviewed possible side effects of Prolia and reviewed importance of not having any significant delays in receiving Prolia which can cause rebound bone loss    --Start Prolia every 6 months  --Continue Vitamin D supplement  --Repeat DXA 4/2026    Autoimmune hepatitis  --Currently on Imuran  --Followed in [place]       Follow up in one year      Visit today included increased complexity associated with the care of the episodic problem hypothyroidism, osteoporosis addressed and managing the longitudinal care of the patient due to the serious and/or complex managed problem(s).      Artemio Devine M.D. Staff Endocrinology

## 2024-05-03 ENCOUNTER — TELEPHONE (OUTPATIENT)
Dept: INFECTIOUS DISEASES | Facility: HOSPITAL | Age: 64
End: 2024-05-03
Payer: COMMERCIAL

## 2024-05-10 ENCOUNTER — TELEPHONE (OUTPATIENT)
Dept: INFECTIOUS DISEASES | Facility: HOSPITAL | Age: 64
End: 2024-05-10
Payer: COMMERCIAL

## 2024-05-10 NOTE — TELEPHONE ENCOUNTER
Called Pt to schedule Prolia injection. Pt stated she is currently in [place] and would like a callback next week.

## 2024-06-02 NOTE — TRANSFER OF CARE
"Anesthesia Transfer of Care Note    Patient: Nuris Cm    Procedure(s) Performed: Procedure(s) (LRB):  EGD (ESOPHAGOGASTRODUODENOSCOPY) (N/A)  COLONOSCOPY (N/A)    Patient location: PACU    Anesthesia Type: general    Transport from OR: Transported from OR on room air with adequate spontaneous ventilation    Post pain: adequate analgesia    Post assessment: no apparent anesthetic complications and tolerated procedure well    Post vital signs: stable    Level of consciousness: awake, alert and oriented    Nausea/Vomiting: no nausea/vomiting    Complications: none    Transfer of care protocol was followed      Last vitals:   Visit Vitals  /60 (BP Location: Left arm, Patient Position: Lying)   Pulse 73   Temp 36.6 °C (97.8 °F) (Temporal)   Resp 18   Ht 5' 8" (1.727 m)   Wt 55.8 kg (123 lb)   SpO2 98%   Breastfeeding No   BMI 18.70 kg/m²     " [phone]

## 2024-06-12 ENCOUNTER — INFUSION (OUTPATIENT)
Dept: INFECTIOUS DISEASES | Facility: HOSPITAL | Age: 64
End: 2024-06-12
Payer: COMMERCIAL

## 2024-06-12 VITALS
TEMPERATURE: 98 F | HEART RATE: 60 BPM | RESPIRATION RATE: 20 BRPM | WEIGHT: 123.81 LBS | SYSTOLIC BLOOD PRESSURE: 127 MMHG | DIASTOLIC BLOOD PRESSURE: 62 MMHG | HEIGHT: 68 IN | BODY MASS INDEX: 18.76 KG/M2 | OXYGEN SATURATION: 97 %

## 2024-06-12 DIAGNOSIS — M81.0 OSTEOPOROSIS, UNSPECIFIED OSTEOPOROSIS TYPE, UNSPECIFIED PATHOLOGICAL FRACTURE PRESENCE: Primary | ICD-10-CM

## 2024-06-12 PROCEDURE — 63600175 PHARM REV CODE 636 W HCPCS: Mod: JZ,JG | Performed by: INTERNAL MEDICINE

## 2024-06-12 PROCEDURE — 96372 THER/PROPH/DIAG INJ SC/IM: CPT

## 2024-06-12 RX ADMIN — DENOSUMAB 60 MG: 60 INJECTION SUBCUTANEOUS at 02:06

## 2024-07-27 ENCOUNTER — OFFICE VISIT (OUTPATIENT)
Dept: URGENT CARE | Facility: CLINIC | Age: 64
End: 2024-07-27
Payer: COMMERCIAL

## 2024-07-27 VITALS
RESPIRATION RATE: 18 BRPM | SYSTOLIC BLOOD PRESSURE: 100 MMHG | TEMPERATURE: 98 F | HEART RATE: 67 BPM | HEIGHT: 68 IN | WEIGHT: 123 LBS | DIASTOLIC BLOOD PRESSURE: 66 MMHG | BODY MASS INDEX: 18.64 KG/M2 | OXYGEN SATURATION: 98 %

## 2024-07-27 DIAGNOSIS — H60.501 ACUTE OTITIS EXTERNA OF RIGHT EAR, UNSPECIFIED TYPE: Primary | ICD-10-CM

## 2024-07-27 PROCEDURE — 99213 OFFICE O/P EST LOW 20 MIN: CPT | Mod: S$GLB,,,

## 2024-07-27 RX ORDER — CIPROFLOXACIN AND DEXAMETHASONE 3; 1 MG/ML; MG/ML
4 SUSPENSION/ DROPS AURICULAR (OTIC) 2 TIMES DAILY
Qty: 7.5 ML | Refills: 0 | Status: SHIPPED | OUTPATIENT
Start: 2024-07-27 | End: 2024-07-27

## 2024-07-27 RX ORDER — ESCITALOPRAM OXALATE 10 MG/1
10 TABLET ORAL
COMMUNITY

## 2024-07-27 RX ORDER — CIPROFLOXACIN AND DEXAMETHASONE 3; 1 MG/ML; MG/ML
4 SUSPENSION/ DROPS AURICULAR (OTIC) 2 TIMES DAILY
Qty: 7.5 ML | Refills: 0 | Status: SHIPPED | OUTPATIENT
Start: 2024-07-27 | End: 2024-08-03

## 2024-07-27 NOTE — PROGRESS NOTES
"Subjective:      Patient ID: Nuris Cm is a 63 y.o. female.    Vitals:  height is 5' 8" (1.727 m) and weight is 55.8 kg (123 lb). Her oral temperature is 97.9 °F (36.6 °C). Her blood pressure is 100/66 and her pulse is 67. Her respiration is 18 and oxygen saturation is 98%.     Chief Complaint: Otalgia    This is a 63 y.o. female who presents today with a chief complaint of ear pain. Pain started last evening. Patient had a few drops of dry blood from her ear.  Patient is experiencing some ringing in her ear.     Provider note starts below:  Patient presents to clinic for evaluation of right ear pain which onset yesterday evening.  Patient describes pain as aching.  States it is painful when she pulls down on her outer ear.  She has noticed some dried blood in the ear canal when she tried to clean her ear.  She has not taken any medications for symptoms.  Denies use of hearing aids, ear buds, or other objects in the ear.  She denies any fever, chills, nausea, vomiting, dizziness, lightheadedness, tinnitus, hearing loss.  No other complaints.    Otalgia   There is pain in the right ear. This is a new problem. The current episode started yesterday. The problem occurs constantly. The problem has been unchanged. There has been no fever. The pain is at a severity of 7/10. The pain is moderate. Associated symptoms include ear discharge. Pertinent negatives include no abdominal pain, coughing, hearing loss, neck pain, rash, sore throat or vomiting. She has tried nothing for the symptoms. The treatment provided no relief. Her past medical history is significant for hearing loss.       Constitution: Negative for appetite change, chills and fever.   HENT:  Positive for ear pain and ear discharge. Negative for foreign body in ear, tinnitus, hearing loss, congestion, postnasal drip, sinus pain and sore throat.    Neck: Negative for neck pain and neck stiffness.   Cardiovascular:  Negative for chest pain and palpitations. "   Eyes:  Negative for eye discharge and eye itching.   Respiratory:  Negative for cough and shortness of breath.    Gastrointestinal:  Negative for abdominal pain, nausea and vomiting.   Musculoskeletal:  Negative for muscle cramps and muscle ache.   Skin:  Negative for pale and rash.   Allergic/Immunologic: Negative for itching and sneezing.   Neurological:  Negative for dizziness, history of vertigo, light-headedness, loss of balance, disorientation and altered mental status.   Psychiatric/Behavioral:  Negative for altered mental status, disorientation and confusion.       Objective:     Physical Exam   Constitutional: She is oriented to person, place, and time.  Non-toxic appearance. She does not appear ill. No distress.   HENT:   Head: Normocephalic and atraumatic.   Ears:   Right Ear: Tympanic membrane normal. There is drainage, swelling and tenderness. Tympanic membrane is not perforated, not erythematous and not bulging. No hemotympanum.   Left Ear: Tympanic membrane, external ear and ear canal normal.   Nose: Nose normal.   Mouth/Throat: Oropharynx is clear.   Swelling and tenderness of right ear canal. There is dried blood noted in the ear canal with surrounding erythema.       Comments: Swelling and tenderness of right ear canal. There is dried blood noted in the ear canal with surrounding erythema.   Eyes: Conjunctivae are normal. Extraocular movement intact   Neck: Neck supple.   Cardiovascular: Normal rate, regular rhythm, normal heart sounds and normal pulses.   Pulmonary/Chest: Effort normal and breath sounds normal.   Abdominal: Normal appearance.   Musculoskeletal: Normal range of motion.         General: Normal range of motion.   Neurological: She is alert, oriented to person, place, and time and at baseline.   Skin: Skin is warm and dry.   Nursing note and vitals reviewed.      Assessment:     1. Acute otitis externa of right ear, unspecified type        Plan:     Acute otitis externa of right  ear, unspecified type  -     Discontinue: ciprofloxacin-dexAMETHasone 0.3-0.1% (CIPRODEX) 0.3-0.1 % DrpS; Place 4 drops into both ears 2 (two) times daily. for 7 days  Dispense: 7.5 mL; Refill: 0  -     ciprofloxacin-dexAMETHasone 0.3-0.1% (CIPRODEX) 0.3-0.1 % DrpS; Place 4 drops into the right ear 2 (two) times daily. for 7 days  Dispense: 7.5 mL; Refill: 0            Patient Instructions   About this topic   If your outer ear or ear canal is swollen and infected, you have an outer ear infection. This is also known as swimmers ear, but you can get it even if you are not swimming. An outer ear infection happens when the skin in the ear canal is scratched or irritated and then gets infected. You may need antibiotics to treat the infection. If you are given ear drops, it is important to take all of them, even if you start to feel better.    Treatment  Ciprodex ear drops, 4 drops in the right ear twice daily for 7 days.  Tylenol or ibuprofen as needed for pain.     What care is needed at home?   Take your drugs as ordered by your doctor.  Sit or lie down with your head to the side and the ear that needs the ear drops pointing up.  Pull on your ear to straighten the ear canal.  Gently pull the ear up and toward the back of the head to straighten it. If you are giving the ear drops to a child under 3 years of age, gently pull the earlobe down and toward the back of the head.  Put the correct number of drops in your ear.  Gently press the small skin flap over the ear to help the drops run into the ear.  Continue to sit or lie with your head to the side for 5 minutes.  Your doctor may want you to put a small cotton plug in your ear to help keep the drops in place.  Keep the inside of your ear dry while it heals. You can protect your ear when you shower with a petroleum jelly-coated cotton ball. Avoid swimming for 7 to 10 days.  Do not use in-ear head phones (ear buds) or hearing aids while your ear heals.  Heat may help  ease your ear pain. If your doctor tells you to use heat, put a heating pad or hot water bottle on your ear for no more than 20 minutes at a time. Never go to sleep with a heating pad on as this can cause burns.    When do I need to call the doctor?   Your symptoms are not better within 2 days after starting treatment.  Your ear starts to bleed or drain pus.  You have a fever of 100.4°F (38°C)    Should you develop any worsening or new symptoms after leaving urgent care, it is recommended that you go to the ER for further/repeat evaluation.      Follow up with your PCP in 3-5 days after your urgent care visit.     Please remember that you have received care at an urgent care today. Urgent cares are not emergency rooms and are not equipped to handle life threatening emergencies and cannot rule in or out certain medical conditions and you may be released before all of your medical problems are known or treated, please schedule all follow up appointments as discussed and if you have worsening symptoms please go to the ER to rule out potential life threatening problems, as discussed.

## 2024-07-27 NOTE — PATIENT INSTRUCTIONS
About this topic   If your outer ear or ear canal is swollen and infected, you have an outer ear infection. This is also known as swimmers ear, but you can get it even if you are not swimming. An outer ear infection happens when the skin in the ear canal is scratched or irritated and then gets infected. You may need antibiotics to treat the infection. If you are given ear drops, it is important to take all of them, even if you start to feel better.    Treatment  Ciprodex ear drops, 4 drops in the right ear twice daily for 7 days.  Tylenol or ibuprofen as needed for pain.     What care is needed at home?   Take your drugs as ordered by your doctor.  Sit or lie down with your head to the side and the ear that needs the ear drops pointing up.  Pull on your ear to straighten the ear canal.  Gently pull the ear up and toward the back of the head to straighten it. If you are giving the ear drops to a child under 3 years of age, gently pull the earlobe down and toward the back of the head.  Put the correct number of drops in your ear.  Gently press the small skin flap over the ear to help the drops run into the ear.  Continue to sit or lie with your head to the side for 5 minutes.  Your doctor may want you to put a small cotton plug in your ear to help keep the drops in place.  Keep the inside of your ear dry while it heals. You can protect your ear when you shower with a petroleum jelly-coated cotton ball. Avoid swimming for 7 to 10 days.  Do not use in-ear head phones (ear buds) or hearing aids while your ear heals.  Heat may help ease your ear pain. If your doctor tells you to use heat, put a heating pad or hot water bottle on your ear for no more than 20 minutes at a time. Never go to sleep with a heating pad on as this can cause burns.    When do I need to call the doctor?   Your symptoms are not better within 2 days after starting treatment.  Your ear starts to bleed or drain pus.  You have a fever of 100.4°F  (38°C)    Should you develop any worsening or new symptoms after leaving urgent care, it is recommended that you go to the ER for further/repeat evaluation.      Follow up with your PCP in 3-5 days after your urgent care visit.     Please remember that you have received care at an urgent care today. Urgent cares are not emergency rooms and are not equipped to handle life threatening emergencies and cannot rule in or out certain medical conditions and you may be released before all of your medical problems are known or treated, please schedule all follow up appointments as discussed and if you have worsening symptoms please go to the ER to rule out potential life threatening problems, as discussed.

## 2024-12-18 ENCOUNTER — INFUSION (OUTPATIENT)
Dept: INFECTIOUS DISEASES | Facility: HOSPITAL | Age: 64
End: 2024-12-18
Payer: COMMERCIAL

## 2024-12-18 VITALS
OXYGEN SATURATION: 100 % | HEIGHT: 68 IN | BODY MASS INDEX: 18.63 KG/M2 | TEMPERATURE: 98 F | WEIGHT: 122.94 LBS | HEART RATE: 59 BPM | SYSTOLIC BLOOD PRESSURE: 130 MMHG | DIASTOLIC BLOOD PRESSURE: 59 MMHG | RESPIRATION RATE: 18 BRPM

## 2024-12-18 DIAGNOSIS — M81.0 OSTEOPOROSIS, UNSPECIFIED OSTEOPOROSIS TYPE, UNSPECIFIED PATHOLOGICAL FRACTURE PRESENCE: Primary | ICD-10-CM

## 2024-12-18 PROCEDURE — 96372 THER/PROPH/DIAG INJ SC/IM: CPT

## 2024-12-18 PROCEDURE — 63600175 PHARM REV CODE 636 W HCPCS: Mod: JZ,JG | Performed by: INTERNAL MEDICINE

## 2024-12-18 RX ADMIN — DENOSUMAB 60 MG: 60 INJECTION SUBCUTANEOUS at 02:12

## 2024-12-18 NOTE — PROGRESS NOTES
Patient arrives for Prolia injection - confirms use of calcium and vitamin D supplements and denies dental procedures over past 3 months - administered per guidelines       Limited head-to-toe assessment due to privacy issues and visit reason though the opportunity was given for patient to express any concerns    Patient arrives for prolia injection as ordered by Dr. Devine. All benefits, risks, requirements, and alternatives should have been discussed with patient by ordering provider at the time the order was placed.

## 2025-03-13 ENCOUNTER — CLINICAL SUPPORT (OUTPATIENT)
Dept: INTERNAL MEDICINE | Facility: CLINIC | Age: 65
End: 2025-03-13
Payer: COMMERCIAL

## 2025-03-13 ENCOUNTER — OFFICE VISIT (OUTPATIENT)
Dept: INTERNAL MEDICINE | Facility: CLINIC | Age: 65
End: 2025-03-13
Payer: COMMERCIAL

## 2025-03-13 ENCOUNTER — RESULTS FOLLOW-UP (OUTPATIENT)
Dept: INTERNAL MEDICINE | Facility: CLINIC | Age: 65
End: 2025-03-13

## 2025-03-13 DIAGNOSIS — R63.4 WEIGHT LOSS: ICD-10-CM

## 2025-03-13 DIAGNOSIS — Z00.00 ENCOUNTER FOR MEDICAL EXAMINATION TO ESTABLISH CARE: Primary | ICD-10-CM

## 2025-03-13 DIAGNOSIS — E06.3 HYPOTHYROIDISM DUE TO HASHIMOTO'S THYROIDITIS: ICD-10-CM

## 2025-03-13 DIAGNOSIS — Z00.00 ENCOUNTER FOR MEDICAL EXAMINATION TO ESTABLISH CARE: ICD-10-CM

## 2025-03-13 DIAGNOSIS — M81.0 OSTEOPOROSIS, UNSPECIFIED OSTEOPOROSIS TYPE, UNSPECIFIED PATHOLOGICAL FRACTURE PRESENCE: ICD-10-CM

## 2025-03-13 DIAGNOSIS — Z85.3 HISTORY OF BREAST CANCER: ICD-10-CM

## 2025-03-13 DIAGNOSIS — K75.4 AUTOIMMUNE HEPATITIS: ICD-10-CM

## 2025-03-13 DIAGNOSIS — E86.0 DEHYDRATION: ICD-10-CM

## 2025-03-13 DIAGNOSIS — Z00.00 ROUTINE GENERAL MEDICAL EXAMINATION AT A HEALTH CARE FACILITY: Primary | ICD-10-CM

## 2025-03-13 DIAGNOSIS — K52.9 CHRONIC DIARRHEA: ICD-10-CM

## 2025-03-13 DIAGNOSIS — K90.0 CELIAC DISEASE: ICD-10-CM

## 2025-03-13 DIAGNOSIS — Z00.00 ROUTINE GENERAL MEDICAL EXAMINATION AT A HEALTH CARE FACILITY: ICD-10-CM

## 2025-03-13 PROBLEM — E53.8 LOW FOLIC ACID: Status: ACTIVE | Noted: 2023-05-11

## 2025-03-13 PROBLEM — E55.9 VITAMIN D DEFICIENCY: Status: ACTIVE | Noted: 2023-04-20

## 2025-03-13 PROBLEM — Z80.41 FAMILY HISTORY OF OVARIAN CANCER: Status: ACTIVE | Noted: 2023-05-11

## 2025-03-13 PROBLEM — Z15.01 BRCA GENE POSITIVE: Status: ACTIVE | Noted: 2023-05-11

## 2025-03-13 PROBLEM — E03.9 HYPOTHYROIDISM: Status: ACTIVE | Noted: 2023-04-20

## 2025-03-13 PROBLEM — E53.8 VITAMIN B12 DEFICIENCY: Status: ACTIVE | Noted: 2023-04-20

## 2025-03-13 PROBLEM — Z15.09 BRCA GENE POSITIVE: Status: ACTIVE | Noted: 2023-05-11

## 2025-03-13 LAB
ALBUMIN SERPL BCP-MCNC: 4.1 G/DL (ref 3.5–5.2)
ALP SERPL-CCNC: 55 U/L (ref 40–150)
ALT SERPL W/O P-5'-P-CCNC: 11 U/L (ref 10–44)
ANION GAP SERPL CALC-SCNC: 12 MMOL/L (ref 8–16)
AST SERPL-CCNC: 25 U/L (ref 10–40)
BASOPHILS # BLD AUTO: 0.01 K/UL (ref 0–0.2)
BASOPHILS NFR BLD: 0.2 % (ref 0–1.9)
BILIRUB SERPL-MCNC: 1.1 MG/DL (ref 0.1–1)
BILIRUB UR QL STRIP: NEGATIVE
BUN SERPL-MCNC: 13 MG/DL (ref 8–23)
CALCIUM SERPL-MCNC: 9.2 MG/DL (ref 8.7–10.5)
CHLORIDE SERPL-SCNC: 103 MMOL/L (ref 95–110)
CLARITY UR REFRACT.AUTO: CLEAR
CO2 SERPL-SCNC: 23 MMOL/L (ref 23–29)
COLOR UR AUTO: COLORLESS
CREAT SERPL-MCNC: 0.6 MG/DL (ref 0.5–1.4)
DIFFERENTIAL METHOD BLD: ABNORMAL
EOSINOPHIL # BLD AUTO: 0 K/UL (ref 0–0.5)
EOSINOPHIL NFR BLD: 0.3 % (ref 0–8)
ERYTHROCYTE [DISTWIDTH] IN BLOOD BY AUTOMATED COUNT: 11.4 % (ref 11.5–14.5)
EST. GFR  (NO RACE VARIABLE): >60 ML/MIN/1.73 M^2
ESTIMATED AVG GLUCOSE: 91 MG/DL (ref 68–131)
GLUCOSE SERPL-MCNC: 90 MG/DL (ref 70–110)
GLUCOSE UR QL STRIP: NEGATIVE
HBA1C MFR BLD: 4.8 % (ref 4–5.6)
HCT VFR BLD AUTO: 39.3 % (ref 37–48.5)
HETEROPH AB SERPL QL IA: NEGATIVE
HGB BLD-MCNC: 13.1 G/DL (ref 12–16)
HGB UR QL STRIP: ABNORMAL
IMM GRANULOCYTES # BLD AUTO: 0.02 K/UL (ref 0–0.04)
IMM GRANULOCYTES NFR BLD AUTO: 0.3 % (ref 0–0.5)
KETONES UR QL STRIP: ABNORMAL
LDH SERPL L TO P-CCNC: 208 U/L (ref 110–260)
LEUKOCYTE ESTERASE UR QL STRIP: NEGATIVE
LYMPHOCYTES # BLD AUTO: 0.8 K/UL (ref 1–4.8)
LYMPHOCYTES NFR BLD: 11.6 % (ref 18–48)
MCH RBC QN AUTO: 34.7 PG (ref 27–31)
MCHC RBC AUTO-ENTMCNC: 33.3 G/DL (ref 32–36)
MCV RBC AUTO: 104 FL (ref 82–98)
MONOCYTES # BLD AUTO: 0.4 K/UL (ref 0.3–1)
MONOCYTES NFR BLD: 6.8 % (ref 4–15)
NEUTROPHILS # BLD AUTO: 5.3 K/UL (ref 1.8–7.7)
NEUTROPHILS NFR BLD: 80.8 % (ref 38–73)
NITRITE UR QL STRIP: NEGATIVE
NRBC BLD-RTO: 0 /100 WBC
PH UR STRIP: 6 [PH] (ref 5–8)
PLATELET # BLD AUTO: 162 K/UL (ref 150–450)
PMV BLD AUTO: 9.6 FL (ref 9.2–12.9)
POTASSIUM SERPL-SCNC: 3.7 MMOL/L (ref 3.5–5.1)
PROT SERPL-MCNC: 7.2 G/DL (ref 6–8.4)
PROT UR QL STRIP: NEGATIVE
RBC # BLD AUTO: 3.78 M/UL (ref 4–5.4)
SODIUM SERPL-SCNC: 138 MMOL/L (ref 136–145)
SP GR UR STRIP: 1 (ref 1–1.03)
TSH SERPL DL<=0.005 MIU/L-ACNC: 0.87 UIU/ML (ref 0.4–4)
URATE SERPL-MCNC: 4 MG/DL (ref 2.4–5.7)
URN SPEC COLLECT METH UR: ABNORMAL
WBC # BLD AUTO: 6.49 K/UL (ref 3.9–12.7)

## 2025-03-13 PROCEDURE — 83615 LACTATE (LD) (LDH) ENZYME: CPT | Performed by: EMERGENCY MEDICINE

## 2025-03-13 PROCEDURE — 3044F HG A1C LEVEL LT 7.0%: CPT | Mod: CPTII,S$GLB,, | Performed by: EMERGENCY MEDICINE

## 2025-03-13 PROCEDURE — 99387 INIT PM E/M NEW PAT 65+ YRS: CPT | Mod: S$GLB,,, | Performed by: EMERGENCY MEDICINE

## 2025-03-13 PROCEDURE — 81003 URINALYSIS AUTO W/O SCOPE: CPT | Performed by: EMERGENCY MEDICINE

## 2025-03-13 PROCEDURE — 85652 RBC SED RATE AUTOMATED: CPT | Performed by: EMERGENCY MEDICINE

## 2025-03-13 PROCEDURE — 3008F BODY MASS INDEX DOCD: CPT | Mod: CPTII,S$GLB,, | Performed by: EMERGENCY MEDICINE

## 2025-03-13 PROCEDURE — 99999 PR PBB SHADOW E&M-EST. PATIENT-LVL III: CPT | Mod: PBBFAC,,, | Performed by: EMERGENCY MEDICINE

## 2025-03-13 PROCEDURE — 86308 HETEROPHILE ANTIBODY SCREEN: CPT | Performed by: EMERGENCY MEDICINE

## 2025-03-13 PROCEDURE — 3074F SYST BP LT 130 MM HG: CPT | Mod: CPTII,S$GLB,, | Performed by: EMERGENCY MEDICINE

## 2025-03-13 PROCEDURE — 99999 PR PBB SHADOW E&M-EST. PATIENT-LVL I: CPT | Mod: CHM,PBBFAC,,

## 2025-03-13 PROCEDURE — 80053 COMPREHEN METABOLIC PANEL: CPT | Performed by: EMERGENCY MEDICINE

## 2025-03-13 PROCEDURE — 3078F DIAST BP <80 MM HG: CPT | Mod: CPTII,S$GLB,, | Performed by: EMERGENCY MEDICINE

## 2025-03-13 PROCEDURE — 97750 PHYSICAL PERFORMANCE TEST: CPT | Mod: S$GLB,,, | Performed by: EMERGENCY MEDICINE

## 2025-03-13 PROCEDURE — 84443 ASSAY THYROID STIM HORMONE: CPT | Performed by: EMERGENCY MEDICINE

## 2025-03-13 PROCEDURE — 85025 COMPLETE CBC W/AUTO DIFF WBC: CPT | Performed by: EMERGENCY MEDICINE

## 2025-03-13 PROCEDURE — 84550 ASSAY OF BLOOD/URIC ACID: CPT | Performed by: EMERGENCY MEDICINE

## 2025-03-13 PROCEDURE — 83036 HEMOGLOBIN GLYCOSYLATED A1C: CPT | Performed by: EMERGENCY MEDICINE

## 2025-03-13 RX ORDER — LIOTHYRONINE SODIUM 5 UG/1
5 TABLET ORAL EVERY MORNING
COMMUNITY

## 2025-03-13 RX ORDER — CYCLOSPORINE 0.5 MG/ML
1 EMULSION OPHTHALMIC 2 TIMES DAILY
COMMUNITY
Start: 2024-09-26

## 2025-03-13 RX ORDER — CIPROFLOXACIN 500 MG/1
500 TABLET ORAL 2 TIMES DAILY
Qty: 14 TABLET | Refills: 0 | Status: SHIPPED | OUTPATIENT
Start: 2025-03-13 | End: 2025-03-20

## 2025-03-13 RX ORDER — ONDANSETRON 4 MG/1
4 TABLET, FILM COATED ORAL EVERY 6 HOURS
Qty: 20 TABLET | Refills: 0 | Status: SHIPPED | OUTPATIENT
Start: 2025-03-13

## 2025-03-13 NOTE — PROGRESS NOTES
[facility]      TODAY'S Date 3/13/2025  Patient ID: Nuris Cm is a 64 y.o. female   MRN: [id number]  Primary Care Physician (PCP):  Lorene Cummings MD    SUBJECTIVE       Chief Complaint:   Chief Complaint   Patient presents with    Annual Exam     Initial visit, lost 6 lbs in  last 2 weeks, diarrhea x 5 days, off and on for a month and a half      HISTORY OF PRESENT ILLNESS (HPI):   Reviewed medical, surgical, social and family history, medications, appropriate preventive health screenings, as well as vaccination history. Updates as noted below or in assessment and plan.    This is a very pleasant 64 y.o. nonsmoking female with PMHx arthritis, asthma, breast cancer, depression, thyroid disease.  She presents to establish care in [facility].  The patient is currently in good health, with the exception of diarrhea and weight loss.    She reports watery diarrhea for the past 5 days with multiple episodes during today's appointment. She denies blood in stool. She notes recent episodes following specific foods including gyro and smoked salmon. She reports feeling lightheaded and dizzy when walking from car to clinic. She endorses poor sleep due to abdominal pain, describing it as sore and achy. She expresses concern about potential dehydration due to frequent diarrhea.    MEDICAL HISTORY:  She has multiple autoimmune conditions including celiac disease (diagnosed 15 years ago), dairy allergy (diagnosed 1 year ago), Hashimoto's thyroiditis (diagnosed at age 38), and autoimmune hepatitis (diagnosed at age 48). She developed adult-onset asthma at age 58. She has a history of breast cancer diagnosed at age 48 and is a BRCA1 gene carrier.    RECENT PROCEDURES:  She underwent squamous cell carcinoma removal on Monday and another removal 2 weeks ago. She had a chemical peel 2-3 days ago. She had an eye exam last month with new glasses  prescription.    MEDICATIONS:  Current medications include Levothyroxine 50mg (decreased from 75mg one month ago), Trazodone for sleep, and Zoloft (recently doubled). She recently discontinued Imuran after 25 years of use.    SOCIAL HISTORY:  She has a history of childhood smoking.      ROS:  General: -fever, -chills, +fatigue, -weight gain, +weight loss  Eyes: -vision changes, -redness, -discharge  ENT: -ear pain, -nasal congestion, -sore throat  Cardiovascular: -chest pain, -palpitations, -lower extremity edema  Respiratory: -cough, -shortness of breath  Gastrointestinal: +abdominal pain, -nausea, -vomiting, +diarrhea, -constipation, -blood in stool  Genitourinary: -dysuria, -hematuria, -frequency  Musculoskeletal: -joint pain, -muscle pain  Skin: -rash, -lesion, +easy bruising, +dry skin  Neurological: -headache, -dizziness, -numbness, -tingling, +lightheadedness, +difficulty falling asleep, +difficulty staying asleep, +sleep disturbances  Psychiatric: -anxiety, -depression, +sleep difficulty  Allergic: +food allergies           A review of medical records indicates patient has seen the following specialists:   Gyn Oncology - Marisel Sheldon MD    Gastroenterology/hepatology - Vikash Gurrola MD  for autoimmune hepatitis  Urology - Stefan Ireland MD      Immunization History   Administered Date(s) Administered    COVID-19, MRNA, LN-S, PF (MODERNA FULL 0.5 ML DOSE) 01/22/2021, 02/12/2021, 08/19/2021    COVID-19, MRNA, LN-S, PF (Pfizer) (Purple Cap) 01/22/2021, 02/12/2021, 08/19/2021    Hepatitis A, Adult 09/26/2016, 03/27/2017    Hepatitis B, Adult 07/24/2006    Influenza 10/24/2009, 02/15/2012, 10/18/2014    Influenza - Quadrivalent 09/26/2016, 08/27/2019, 08/27/2019    Influenza - Quadrivalent - PF *Preferred* (6 months and older) 12/04/2020    Influenza - Trivalent - Afluria, Fluzone MDV 10/24/2009, 02/15/2012, 10/18/2014    Influenza - Trivalent - Fluad - Adjuvanted - PF (65 years and older  "11/07/2024    Typhoid - ViCPs 09/26/2016     Past Medical History:   Diagnosis Date    Arthritis     Asthma     Breast cancer 2008    double mastecomy    Depression     Encounter for blood transfusion     Hepatitis 1998    autoimmune    Thyroid disease      Past Surgical History:   Procedure Laterality Date    BREAST RECONSTRUCTION  2010    COLONOSCOPY N/A 3/14/2023    Procedure: COLONOSCOPY;  Surgeon: John Pan MD;  [facility]  Service: Endoscopy;  Laterality: N/A;    COSMETIC SURGERY      CYSTOSCOPY      ESOPHAGOGASTRODUODENOSCOPY N/A 04/19/2022    Procedure: ESOPHAGOGASTRODUODENOSCOPY (EGD);  Surgeon: Alexa Odell MD;  Location: [facility] ENDO ([facility]);  Service: Endoscopy;  Laterality: N/A;  fully vaccinated, instructions sent to [id number]    ESOPHAGOGASTRODUODENOSCOPY N/A 3/14/2023    Procedure: EGD (ESOPHAGOGASTRODUODENOSCOPY);  Surgeon: John Pan MD;  [facility]  Service: Endoscopy;  Laterality: N/A;  constipation protocol, instr portal - handed to patient -ml  Precall complete- [place]    HYSTERECTOMY  2010    masectomy  2008    ROTATOR CUFF REPAIR Right 11/2021    TONSILLECTOMY       Family History   Problem Relation Name Age of Onset    Arthritis Mother      Heart failure Father      Arthritis Maternal Aunt      Heart failure Paternal Uncle 2     Cancer Maternal Grandmother      Cancer Paternal Grandmother       Social History[1]  Past Medical, Surgical and Social history reviewed and verified by me.     Review of patient's allergies indicates:   Allergen Reactions    Codeine Hives    Gluten Diarrhea    Latex Hives     Medications Ordered Prior to Encounter[2]    OBJECTIVE     PHYSICAL EXAM  Vitals:    03/13/25 1444   BP: 110/70   BP Location: Right arm   Patient Position: Sitting   Pulse: 81   SpO2: 98%   Weight: 52.2 kg (115 lb)   Height: 5' 8" (1.727 m)     Vital Signs (Most Recent):  Pulse: 81 (03/13/25 1444)  BP: 110/70 (03/13/25 1444)  SpO2: 98 % " "(03/13/25 0664)    Weight:   Wt Readings from Last 1 Encounters:   03/13/25 52.2 kg (115 lb)     Anthropometrics  Height:  5' 8"                  Weight:  115.5# (March 13, 2025)  BMI:  17.5  % Body Fat:  33.8%  Relatively normal vitals  Physical Exam    General: No acute distress. Well-developed. Well-nourished.  Eyes: EOMI. Sclerae anicteric.  HENT: Normocephalic. Atraumatic. Nares patent. Moist oral mucosa.  Ears: Bilateral TMs clear. Bilateral EACs clear.  Cardiovascular: Regular rate. Regular rhythm. No murmurs. No rubs. No gallops. Normal S1, S2.  Respiratory: Normal respiratory effort. Clear to auscultation bilaterally. No rales. No rhonchi. No wheezing.  Abdomen: Soft. Non-tender. Non-distended. Normoactive bowel sounds.  Musculoskeletal: No  obvious deformity. Able to walk on tippy toes. Off balance when walking on tippy toes. Able to take a step forward on heels. Able to stand without using hands.  Extremities: No lower extremity edema.  Neurological: Alert & oriented x3. No slurred speech. Normal gait.  Psychiatric: Normal mood. Normal affect. Good insight. Good judgment.  Skin: Warm. Dry. No rash. Skin is a little flaky.  Mouth: Able to stick out tongue. Able to move tongue to the left. Able to move tongue to the right. Able to puff out cheeks.  Neck: Able to shrug shoulders against resistance.         Tests      Results for orders placed or performed during the hospital encounter of 04/07/22   EKG 12-lead    Collection Time: 04/07/22  8:39 AM    Narrative    Test Reason : Z00.00,    Vent. Rate : 069 BPM     Atrial Rate : 069 BPM     P-R Int : 140 ms          QRS Dur : 080 ms      QT Int : 408 ms       P-R-T Axes : 074 -17 042 degrees     QTc Int : 437 ms    Normal sinus rhythm  Normal ECG  When compared with ECG of 13-APR-2021 08:58,  No significant change was found  Confirmed by Colton Acosta MD ([id number]) on 4/7/2022 9:41:04 AM    Referred By: MARCUS OSORIO           Confirmed By:Colton Acosta MD    "   Labs reviewed and independently interpreted. Imaging studies reviewed.   Recent Results (from the past 12 weeks)   COMPREHENSIVE METABOLIC PANEL    Collection Time: 02/06/25  7:53 AM   Result Value Ref Range    Glucose 82 65 - 99 mg/dL    Blood Urea Nitrogen 12 7 - 25 mg/dL    Creatinine 0.67 0.50 - 1.05 mg/dL    eGFR 96 > OR = 60 mL/min/1.73m2    BUN/Creatinine Ratio - [facility] SEE NOTE: 6 - 22 (calc)    Sodium 144 135 - 146 mmol/L    Potassium 4.6 3.5 - 5.3 mmol/L    Chloride 106 98 - 110 mmol/L    Carbon Dioxide 29 20 - 32 mmol/L    Calcium 9.1 8.6 - 10.4 mg/dL    Protein Total 6.3 6.1 - 8.1 g/dL    Albumin Level 4.2 3.6 - 5.1 g/dL    Globulin 2.1 1.9 - 3.7 g/dL (calc)    Albumin/Globulin Ratio 2.0 1.0 - 2.5 (calc)    Total Bilirubin 0.4 0.2 - 1.2 mg/dL    Alkaline Phosphatase 45 37 - 153 U/L    AST 19 10 - 35 U/L    ALT 9 6 - 29 U/L   Urinalysis, Reflex to Urine Culture Urine, Clean Catch    Collection Time: 03/13/25 12:16 PM    Specimen: Urine   Result Value Ref Range    Specimen UA Urine, Clean Catch     Color, UA Colorless (A) Yellow, Straw, Violeta    Appearance, UA Clear Clear    pH, UA 6.0 5.0 - 8.0    Specific Gravity, UA 1.005 1.005 - 1.030    Protein, UA Negative Negative    Glucose, UA Negative Negative    Ketones, UA 1+ (A) Negative    Bilirubin (UA) Negative Negative    Occult Blood UA Trace (A) Negative    Nitrite, UA Negative Negative    Leukocytes, UA Negative Negative   COMPREHENSIVE METABOLIC PANEL    Collection Time: 03/13/25 12:34 PM   Result Value Ref Range    Sodium 138 136 - 145 mmol/L    Potassium 3.7 3.5 - 5.1 mmol/L    Chloride 103 95 - 110 mmol/L    CO2 23 23 - 29 mmol/L    Glucose 90 70 - 110 mg/dL    BUN 13 8 - 23 mg/dL    Creatinine 0.6 0.5 - 1.4 mg/dL    Calcium 9.2 8.7 - 10.5 mg/dL    Total Protein 7.2 6.0 - 8.4 g/dL    Albumin 4.1 3.5 - 5.2 g/dL    Total Bilirubin 1.1 (H) 0.1 - 1.0 mg/dL    Alkaline Phosphatase 55 40 - 150 U/L    AST 25 10 - 40 U/L    ALT 11 10 - 44 U/L    eGFR >60.0  >60 mL/min/1.73 m^2    Anion Gap 12 8 - 16 mmol/L   CBC W/ AUTO DIFFERENTIAL    Collection Time: 03/13/25 12:34 PM   Result Value Ref Range    WBC 6.49 3.90 - 12.70 K/uL    RBC 3.78 (L) 4.00 - 5.40 M/uL    Hemoglobin 13.1 12.0 - 16.0 g/dL    Hematocrit 39.3 37.0 - 48.5 %     (H) 82 - 98 fL    MCH 34.7 (H) 27.0 - 31.0 pg    MCHC 33.3 32.0 - 36.0 g/dL    RDW 11.4 (L) 11.5 - 14.5 %    Platelets 162 150 - 450 K/uL    MPV 9.6 9.2 - 12.9 fL    Immature Granulocytes 0.3 0.0 - 0.5 %    Gran # (ANC) 5.3 1.8 - 7.7 K/uL    Immature Grans (Abs) 0.02 0.00 - 0.04 K/uL    Lymph # 0.8 (L) 1.0 - 4.8 K/uL    Mono # 0.4 0.3 - 1.0 K/uL    Eos # 0.0 0.0 - 0.5 K/uL    Baso # 0.01 0.00 - 0.20 K/uL    nRBC 0 0 /100 WBC    Gran % 80.8 (H) 38.0 - 73.0 %    Lymph % 11.6 (L) 18.0 - 48.0 %    Mono % 6.8 4.0 - 15.0 %    Eosinophil % 0.3 0.0 - 8.0 %    Basophil % 0.2 0.0 - 1.9 %    Differential Method Automated    HEMOGLOBIN A1C    Collection Time: 03/13/25 12:34 PM   Result Value Ref Range    Hemoglobin A1C 4.8 4.0 - 5.6 %    Estimated Avg Glucose 91 68 - 131 mg/dL   TSH    Collection Time: 03/13/25 12:34 PM   Result Value Ref Range    TSH 0.874 0.400 - 4.000 uIU/mL   Lactate dehydrogenase (LDH)    Collection Time: 03/13/25 12:34 PM   Result Value Ref Range     110 - 260 U/L   Uric acid    Collection Time: 03/13/25 12:34 PM   Result Value Ref Range    Uric Acid 4.0 2.4 - 5.7 mg/dL   Sedimentation rate    Collection Time: 03/13/25 12:34 PM   Result Value Ref Range    Sed Rate 6 0 - 36 mm/Hr   Monospot    Collection Time: 03/13/25 12:34 PM   Result Value Ref Range    Monospot Negative Negative   Giardia / Cryptosporidum, EIA    Collection Time: 03/17/25  2:04 PM   Result Value Ref Range    Giardia Antigen - EIA Negative Negative    Cryptosporidium Antigen Negative Negative   Occult blood x 1, stool    Collection Time: 03/17/25  2:44 PM    Specimen: Stool   Result Value Ref Range    Occult Blood Negative Negative   WBC, Stool     Collection Time: 03/17/25  2:44 PM   Result Value Ref Range    Stool WBC No neutrophils seen No neutrophils seen   Stool culture    Collection Time: 03/17/25  2:44 PM    Specimen: Stool   Result Value Ref Range    Stool Culture Nothing significant to date    Rotavirus antigen, stool    Collection Time: 03/17/25  2:44 PM   Result Value Ref Range    Rotavirus Negative Negative   E. coli 0157 antigen    Collection Time: 03/17/25  2:44 PM    Specimen: Stool   Result Value Ref Range    Shiga Toxin 1 E.coli Negative     Shiga Toxin 2 E.coli Negative       DXA BONE DENSITY AXIAL SKELETON 1 OR MORE SITES April 2024 Impression:   *Osteoporosis based on T-score below -2.5  *Fracture risk is very high due to T-score below -3.0  *Compared with previous DXA, BMD at the lumbar spine has declined by 4%, and BMD at the total hip has declined by 12.5%.         US KIDNEY December 2022 Impression: Left nonobstructive nephrolithiasis.          MRI BRAIN WITHOUT CONTRAST March 2022 Impression   No acute intracranial hemorrhage or evidence of acute ischemia.  Hyperintense signal intensity scattered throughout the subcortical and periventricular deep white matter, which is a finding that is nonspecific   but can be seen in the setting of chronic small vessel ischemia, vasculitis or as a     sequela of chronic migraine headaches.  Electronically Signed By: Tico Campbell MD 3/22/2022 1:28 PM CDT         XR SHOULDER COMPLETE 2 OR MORE VIEWS RIGHT November 2021 FINDINGS:  Shoulder complete three views right: There is baseline DJD.  No fracture, dislocation, or bone destruction seen.  There are right axillary clips.         XR LUMBAR SPINE AP AND LATERAL July 2021 Impression: Spondylosis of the lumbar spine no definite acute process seen.          US Abdomen Limited December 2020 impression Elastography  1. Performing staging due to autoimmune hepatitis.  2. Fibrosis stage: 1  3. Image Quality: Excellent  4. SI: 90% - 98%  5. SCD: 1.70cm  6.  SD: 0.4 kPA          US LIVER WITH DOPPLER November 2020 Impression: Satisfactory Doppler evaluation of the liver.          CT CHEST WITHOUT CONTRAST November 2019 IMPRESSION:    1.  Bilateral mastectomies with breast reconstruction. Right axillary kenroy dissection.   2.  Right apical opacity measuring 2.6 x 1.6 cm (axial series 4, image 79), initially identified on chest radiograph 12/06/2012.  Favored to represent post radiation change, however is not in such typical geographic distribution.  Other considerations include infection, non infectious inflammation, and in the indolent neoplasm.  Clinical considerations will determine if further characterization of this lesion is warranted with percutaneous biopsy.   3.  Two small pulmonary nodules identified:   A. 0.5 cm soft tissue nodule in the superior segment of the right lower lobe.   B. 0.6 cm soft tissue nodule in the anteromedial basal segment of the left lower lobe.     These are too small to be identified by chest radiograph and therefore are of uncertain chronicity in the absence of prior chest CTs.        ASSESSMENT/PLAN:     MDM  Reviewed: previous chart, nursing note and vitals  Reviewed previous: labs, ECG, CT scan, ultrasound and x-ray  Interpretation: labs (elevated total bilirubin, MCV and gran % otherwise relatively unremarkable Lipid Panel, CMP, CBC, TSH, HgA1C, stool culture, stool E coli, stool rotavirus, stool Shiga toxin, Giardia, occult stool, sed rate, uric acid, LD. +ketonuria)    Health Maintenance   Topic Date Due    TETANUS VACCINE  Never done    Mammogram  Never done    Shingles Vaccine (1 of 2) Never done    Pneumococcal Vaccines (Age 50+) (1 of 2 - PCV) Never done    RSV Vaccine (Age 60+ and Pregnant patients) (1 - Risk 60-74 years 1-dose series) Never done    COVID-19 Vaccine (7 - 2024-25 season) 09/01/2024    DEXA Scan  04/02/2026    Colorectal Cancer Screening  03/14/2033    Hepatitis C Screening  Completed    Influenza Vaccine   Ahmet Lawler was seen today for annual exam.    Diagnoses and all orders for this visit:    Encounter for medical examination to establish care  -     COMPREHENSIVE METABOLIC PANEL; Future  -     CBC W/ AUTO DIFFERENTIAL; Future  -     HEMOGLOBIN A1C; Future  -     TSH; Future    Weight loss  -     Lactate dehydrogenase (LDH); Future  -     Uric acid; Future  -     Sedimentation rate; Future  -     Monospot; Future  -     Urinalysis, Reflex to Urine Culture Urine, Clean Catch; Future  -     Urinalysis, Reflex to Urine Culture Urine, Clean Catch    Chronic diarrhea  -     ondansetron (ZOFRAN) 4 MG tablet; Take 1 tablet (4 mg total) by mouth every 6 (six) hours.  -     ciprofloxacin HCl (CIPRO) 500 MG tablet; Take 1 tablet (500 mg total) by mouth 2 (two) times daily. for 7 days  -     Occult blood x 1, stool; Future  -     WBC, Stool; Future  -     Stool culture; Future  -     Giardia / Cryptosporidum, EIA; Future  -     Stool Exam-Ova,Cysts,Parasites; Future  -     Rotavirus antigen, stool; Future    Dehydration  -     ondansetron (ZOFRAN) 4 MG tablet; Take 1 tablet (4 mg total) by mouth every 6 (six) hours.    Celiac disease    Osteoporosis, unspecified osteoporosis type, unspecified pathological fracture presence    Autoimmune hepatitis    History of breast cancer    Hypothyroidism due to Hashimoto's thyroiditis     Assessment & Plan    IMPRESSION & PLAN:  Recent severe watery diarrhea leading to dehydration and weight loss evaluated for potential causes, including medication changes, dietary factors, and exacerbation of underlying conditions.  Plan:  Obtain labs to assess hydration status and electrolyte balance.  Consider IV fluids if needed, preferably at an infusion center rather than the ER.  Clarify current medications and dosages, particularly thyroid and psychiatric medications.  Medication Adjustments:  Imuran (Azathioprine) discontinued due to potential side effects.  Levothyroxine decreased  from 75 mcg to 50 mcg.  Zoloft dose increased (doubled).  Trazodone continued for sleep.  Patient Instructions:  Follow BRAT diet (Bananas, Rice, Applesauce, Toast) to help manage diarrhea.  Increase fluid intake to prevent dehydration.  Monitor for signs of infection (fever, sore throat, unusual fatigue) due to prior Imuran use, which can suppress white blood cell (WBC) levels.  Regular CBC with differential recommended to monitor WBCs.  No need for mammogram due to prior mastectomy.  Traveling this weekend; will bring in stool sample in one week if symptoms persist.      The results of physical exam findings, labs, and imaging were reviewed with the patient. Management of above assessments/visit diagnoses was discussed with patient. Precautions for return discussed at length. Patient was given ample time for questions. All questions asked and answered to the satisfaction of the patient. Patient is in agreement with the above and verbalized understanding. Total time spent caring for the patient today was 60 minutes. This includes time spent before the visit reviewing the chart, time spent during the visit, and time spent after the visit on documentation.    Lorene Cummings MD  [facility]    Disclaimer: This document was created using voice recognition software (M*Modal Fluency Direct). Although it may be edited, this document may contain errors related to incorrect recognition of the spoken word. Please contact the physician if clarification is needed.           [1]   Social History  Tobacco Use    Smoking status: Never    Smokeless tobacco: Never   Substance Use Topics    Alcohol use: Yes     Comment: 2-3 drinks a week    Drug use: No   [2]   Current Outpatient Medications on File Prior to Visit   Medication Sig Dispense Refill    cycloSPORINE (RESTASIS) 0.05 % ophthalmic emulsion Place 1 drop into both eyes 2 (two) times daily.      azelastine (ASTELIN) 137 mcg (0.1 %)  nasal spray 2 sprays once daily.      celecoxib (CELEBREX) 200 MG capsule Take 200 mg by mouth once daily.       denosumab (PROLIA) 60 mg/mL Syrg Inject 60 mg into the skin every 6 (six) months.      diphenoxylate-atropine 2.5-0.025 mg (LOMOTIL) 2.5-0.025 mg per tablet Take 1 tablet by mouth 3 (three) times daily as needed for Diarrhea. 40 tablet 3    fluoruracil (CARAC) 0.5 % cream Apply topically. (Patient not taking: Reported on 7/27/2024)      levothyroxine (SYNTHROID) 75 MCG tablet Take 1 tablet (75 mcg total) by mouth before breakfast. 90 tablet 3    liothyronine (CYTOMEL) 5 MCG Tab Take 5 mcg by mouth every morning.      meloxicam (MOBIC) 15 MG tablet Take 1 tablet by mouth once daily.  3    tizanidine (ZANAFLEX) 4 MG tablet Take 4 mg by mouth nightly as needed.       traZODone (DESYREL) 100 MG tablet Take 100 mg by mouth nightly.      valACYclovir (VALTREX) 500 MG tablet Take 1 tablet (500 mg total) by mouth once daily. 90 tablet 3     No current facility-administered medications on file prior to visit.

## 2025-03-13 NOTE — PROGRESS NOTES
"Nutrition Assessment  Session Time:  45 minutes      Client name:  Nuris Cm  :  1960  Age:  64 y.o.  Gender:  female    Client states:  Very pleasant patient here for her initial  Health physical.  Accompanied by her , who is also a  member.  Former patient of Dr. Urban.  Met with integrative medicine physician in the past, sharing results of extensive blood panels.  Recalls hx of Celiac disease and so, adheres to a gluten-free diet.  Is also dairy and caffeine-free.  Became tearful, recalling unintentional weight loss (6#) over the past 1-2 weeks due to diarrhea.  Has been experiencing gas, abdominal pain, and diarrhea (watery stools) for the past five days.  Unable to tolerate many foods, recalling recent encounter in which she had diarrhea within 30 minutes of PO intake (restaurant gyro and hummus).  Not able to tolerate vitamin/mineral supplements and so, not consuming.  Recalls hx of osteoporosis.  Is concerned that she is dehydrated and malnourished, noting that her current weight is 115.5# in contrast to her UBW of 121-125#.  Is not typically dehydrated, sharing intake of ~60 oz of water daily.   prepares the majority of foods at home, averaging two meals/day.  Inquired about recommended foods given her food allergies and intolerances.  Able to tolerate gluten-free grains and nut butter.  Currently, following a bland diet, limiting intake of spicy foods.  Is not able to exercise at this time due to GI distress and resulting fatigue although understands its importance.  Overall, wishes to improve GI health.    Anthropometrics  Height:  5' 8"     Weight:  115.5#  BMI:  17.5  % Body Fat:  33.8%    Clinical Signs/Symptoms  N/V/D:  Diarrhea  Appetite:  fair       Past Medical History:   Diagnosis Date    Arthritis     Asthma     Breast cancer 2008    double mastecomy    Depression     Encounter for blood transfusion     Hepatitis 1998    autoimmune    Thyroid disease  "       Past Surgical History:   Procedure Laterality Date    BREAST RECONSTRUCTION  2010    COLONOSCOPY N/A 3/14/2023    Procedure: COLONOSCOPY;  Surgeon: John Pan MD;  Location: [facility] (4TH FLR);  Service: Endoscopy;  Laterality: N/A;    COSMETIC SURGERY      CYSTOSCOPY      ESOPHAGOGASTRODUODENOSCOPY N/A 04/19/2022    Procedure: ESOPHAGOGASTRODUODENOSCOPY (EGD);  Surgeon: Alexa Odell MD;  Location: [facility] (4TH FLR);  Service: Endoscopy;  Laterality: N/A;  fully vaccinated, instructions sent to [facility]    ESOPHAGOGASTRODUODENOSCOPY N/A 3/14/2023    Procedure: EGD (ESOPHAGOGASTRODUODENOSCOPY);  Surgeon: John Pan MD;  Location: [facility] (4TH FLR);  Service: Endoscopy;  Laterality: N/A;  constipation protocol, instr portal - handed to patient -ml  Precall complete- [place]    HYSTERECTOMY  2010    masectomy  2008    ROTATOR CUFF REPAIR Right 11/2021    TONSILLECTOMY         Medications    has a current medication list which includes the following prescription(s): albuterol, ammonium lactate, aripiprazole, arm brace, azathioprine, azathioprine, azelastine, azithromycin, celecoxib, cephalexin, ciclopirox, ciprofloxacin hcl, denosumab, diphenoxylate-atropine 2.5-0.025 mg, escitalopram oxalate, fluocinonide, fluoruracil, gabapentin, levothyroxine, meloxicam, mupirocin, nitrofurantoin (macrocrystal-monohydrate), prednisone, sumatriptan, tizanidine, trazodone, triamcinolone acetonide 0.1%, valacyclovir, venlafaxine, venlafaxine, and venlafaxine.    Vitamins, Minerals, and/or Supplements:  None     Food/Medication Interactions:  Reviewed     Food Allergies or Intolerances:  Gluten-intolerant, dairy-free, caffeine-free     Social History    Marital status:    Employment:  N/A    Social History     Tobacco Use    Smoking status: Never    Smokeless tobacco: Never   Substance Use Topics    Alcohol use: Yes     Comment: 2-3 drinks a week        Lab Reports   Sodium   Date Value Ref Range Status    02/06/2025 144 135 - 146 mmol/L Final   02/01/2023 141 136 - 145 mmol/L Final     Potassium   Date Value Ref Range Status   02/06/2025 4.6 3.5 - 5.3 mmol/L Final   02/01/2023 3.7 3.5 - 5.1 mmol/L Final     Chloride   Date Value Ref Range Status   02/01/2023 106 95 - 110 mmol/L Final     CO2   Date Value Ref Range Status   02/01/2023 27 23 - 29 mmol/L Final     Carbon Dioxide   Date Value Ref Range Status   02/06/2025 29 20 - 32 mmol/L Final     Glucose   Date Value Ref Range Status   02/06/2025 82 65 - 99 mg/dL Final     Comment:                  Fasting reference interval   02/01/2023 91 70 - 110 mg/dL Final     BUN   Date Value Ref Range Status   02/01/2023 9 8 - 23 mg/dL Final     Blood Urea Nitrogen   Date Value Ref Range Status   02/06/2025 12 7 - 25 mg/dL Final     Creatinine   Date Value Ref Range Status   02/06/2025 0.67 0.50 - 1.05 mg/dL Final   02/01/2023 0.7 0.5 - 1.4 mg/dL Final     Calcium   Date Value Ref Range Status   02/06/2025 9.1 8.6 - 10.4 mg/dL Final   02/01/2023 9.5 8.7 - 10.5 mg/dL Final     Total Protein   Date Value Ref Range Status   02/01/2023 6.7 6.0 - 8.4 g/dL Final     Albumin   Date Value Ref Range Status   02/01/2023 4.0 3.5 - 5.2 g/dL Final     Albumin Level   Date Value Ref Range Status   02/06/2025 4.2 3.6 - 5.1 g/dL Final     Total Bilirubin   Date Value Ref Range Status   02/06/2025 0.4 0.2 - 1.2 mg/dL Final   02/01/2023 0.6 0.1 - 1.0 mg/dL Final     Comment:     For infants and newborns, interpretation of results should be based  on gestational age, weight and in agreement with clinical  observations.    Premature Infant recommended reference ranges:  Up to 24 hours.............<8.0 mg/dL  Up to 48 hours............<12.0 mg/dL  3-5 days..................<15.0 mg/dL  6-29 days.................<15.0 mg/dL       Alkaline Phosphatase   Date Value Ref Range Status   02/06/2025 45 37 - 153 U/L Final   02/01/2023 62 55 - 135 U/L Final     AST   Date Value Ref Range Status    02/06/2025 19 10 - 35 U/L Final   02/01/2023 18 10 - 40 U/L Final     ALT   Date Value Ref Range Status   02/06/2025 9 6 - 29 U/L Final   02/01/2023 8 (L) 10 - 44 U/L Final     Anion Gap   Date Value Ref Range Status   02/01/2023 8 8 - 16 mmol/L Final     eGFR if    Date Value Ref Range Status   04/07/2022 >60.0 >60 mL/min/1.73 m^2 Final     eGFR if non    Date Value Ref Range Status   04/07/2022 >60.0 >60 mL/min/1.73 m^2 Final     Comment:     Calculation used to obtain the estimated glomerular filtration  rate (eGFR) is the CKD-EPI equation.         Lab Results   Component Value Date    WBC 3.30 (L) 02/01/2023    HGB 13.2 02/01/2023    HCT 39.7 02/01/2023     (H) 02/01/2023     02/01/2023        Lab Results   Component Value Date    CHOL 176 04/07/2022     Lab Results   Component Value Date    HDL 56 04/07/2022     Lab Results   Component Value Date    LDLCALC 105.8 04/07/2022     Lab Results   Component Value Date    TRIG 71 04/07/2022     Lab Results   Component Value Date    CHOLHDL 31.8 04/07/2022     Lab Results   Component Value Date    HGBA1C 4.8 04/07/2022     BP Readings from Last 1 Encounters:   12/18/24 (!) 130/59       Food History  Breakfast:  None  Mid-morning Snack:  None  Lunch:  Restaurant hummus + gyro   Mid-afternoon Snack:  None  Dinner:  Chicken + rice  Snack:  None  *Fluid intake:  ~60 oz water    Exercise History:  No formal exercise routine at this time    Cultural/Spiritual/Personal Preferences:  None identified    Support System:  spouse    State of Change:  Contemplation    Barriers to Change:  Diarrhea    Diagnosis    Involuntary weight loss related to diarrhea as evidenced by patient reports 6# weight loss x 1-2 weeks.    Intervention    RMR (Method:  InBody):  1119 kcal  Activity Factor:  1.3    CHECO:  1454 kcal    Goals:  1.  Defer GI management to MD*  2.  Consider electrolyte-replacement, such as Pedialyte during diarrhea episodes  3.   Maintain adherence to low fiber, bland diet during diarrhea episodes  4.  Aim for 60 oz fluid/day    Nutrition Education  The following education was provided to the patient:  Complimented patient on proactive role in health maintenance.  Discussed weight management.  Suggested dietary modifications based on current dietary behaviors and individual food preferences.  Discussed recommended protein intake (may include but not limited to recommended food sources, benefits of adequate protein intake, importance of protein distribution, etc.)   Discussed recommended fiber intake and food sources of such.  Discussed benefits of adequate hydration and recommended fluid intake.  Discussed [place] client resources (may include but not limited to Eat Fit Shopping List, Fueling Well on the Go, and Meal Planning Guide).  Discussed vitamin/mineral recommendations (may include but not limited to MVI, Vitamin D, Calcium, and/or Iron) and associated food sources.  Discussed importance of small behavior/habit changes in improving long-term adherence and sustainability.  Discussed goal setting.  Discussed GI health and diarrhea nutrition therapy.    Patient verbalized understanding of nutrition education and recommendations received.    Handouts Provided  Meal Planning Guide  Eat Fit Shopping List  Fueling Well On-The-Go  Diarrhea Nutrition Therapy  Calcium Content of Foods    Monitoring/Evaluation    Monitor the following:  Weight  BMI  % Body Fat  Caloric intake  CMP    Follow Up Plan:  Communication with referring healthcare provider is unnecessary at this time as patient presented as part of annual wellness exam.  However, will follow up with patient in 1-2 years.

## 2025-03-13 NOTE — PROGRESS NOTES
Pt. Has no significant cardiovascular or pulmonary history.    Physical Limitations: Patient has a hx of breast cancer, 16 yrs ago, which left a lot of scar tissue near her right shoulder and hinders ROM. She had shoulder repair surgery 3 yrs ago. Patient also noted hx of osteoporosis for which she takes the injections. Patient was not feeling well today and decided to defer push-ups and curl-ups.     Current exercise routine: Patient does not follow a formal workout routine.     Goals: No goals discussed.     Notes:  Patient was friendly and engaged. Patient was seen for her initial fitness evaluation today. She wasn't feeling well and noted that for about the past week she hasn't been able to keep food in her. She will eat or drink and within 30 mins has a bout of diarrhea. She noted she's down about 6 lbs within the last week or two. She has Celiac's dx and a dairy allergy. She's been dealing with some chronic fatigue and hasn't been active in anyway due to this. Patient asked questions and was receptive to all recommendations.      The fitness evaluation results are as follows:    D.O.S. 3/13/2025   Height (in): 68   Weight (lbs): 115.5   BMI: 17.362220   Body Fat (%): 33.80   Waist (cm): 62.5   RBP (mmHg): 110/70   RHR (bpm): 81    Strength Dominant (Lbs): 35    Strength Non Dominant (Lbs): 33   Push-up Assessment: Deferred   Curl-up Assessment: Deferred   Flexibility Testing (cm): 22   REE (kcals): 1119       Age/gender stratified assessment:    Resting BP: Within Normal Limits   Body Fat %: Good   Waist Circumference: Very Low    Strength Dominant: 10th    Strength Non Dominant: 10th   Upper Body Endurance: Deferred   Abdominal Endurance: Deferred   Lower body Flexibiltiy: Needs Improvement       Recommended fitness guidelines:    -150 minutes of moderate intensity aerobic exercise per week or 75 minutes of vigorous intensity aerobic exercise per week. Try to break up your sedentary periods of  time by getting up and moving around at least once an hour.    -2 to 4 days per week of resistance training for each muscle group.      -Daily stretching with a hold of at least 30 seconds per muscle group.

## 2025-03-14 LAB — ERYTHROCYTE [SEDIMENTATION RATE] IN BLOOD BY PHOTOMETRIC METHOD: 6 MM/HR (ref 0–36)

## 2025-03-17 ENCOUNTER — LAB VISIT (OUTPATIENT)
Dept: LAB | Facility: HOSPITAL | Age: 65
End: 2025-03-17
Attending: EMERGENCY MEDICINE
Payer: COMMERCIAL

## 2025-03-17 DIAGNOSIS — K52.9 CHRONIC DIARRHEA: ICD-10-CM

## 2025-03-17 PROCEDURE — 82272 OCCULT BLD FECES 1-3 TESTS: CPT | Performed by: EMERGENCY MEDICINE

## 2025-03-17 PROCEDURE — 87046 STOOL CULTR AEROBIC BACT EA: CPT | Performed by: EMERGENCY MEDICINE

## 2025-03-17 PROCEDURE — 87425 ROTAVIRUS AG IA: CPT | Performed by: EMERGENCY MEDICINE

## 2025-03-17 PROCEDURE — 87427 SHIGA-LIKE TOXIN AG IA: CPT | Mod: 59 | Performed by: EMERGENCY MEDICINE

## 2025-03-17 PROCEDURE — 87449 NOS EACH ORGANISM AG IA: CPT | Performed by: EMERGENCY MEDICINE

## 2025-03-17 PROCEDURE — 87045 FECES CULTURE AEROBIC BACT: CPT | Performed by: EMERGENCY MEDICINE

## 2025-03-17 PROCEDURE — 87328 CRYPTOSPORIDIUM AG IA: CPT | Performed by: EMERGENCY MEDICINE

## 2025-03-17 PROCEDURE — 87177 OVA AND PARASITES SMEARS: CPT | Performed by: EMERGENCY MEDICINE

## 2025-03-17 PROCEDURE — 89055 LEUKOCYTE ASSESSMENT FECAL: CPT | Performed by: EMERGENCY MEDICINE

## 2025-03-18 LAB
CRYPTOSP AG STL QL IA: NEGATIVE
G LAMBLIA AG STL QL IA: NEGATIVE
OB PNL STL: NEGATIVE
RV AG STL QL IA.RAPID: NEGATIVE

## 2025-03-19 VITALS
SYSTOLIC BLOOD PRESSURE: 110 MMHG | HEART RATE: 81 BPM | WEIGHT: 115 LBS | BODY MASS INDEX: 17.43 KG/M2 | HEIGHT: 68 IN | DIASTOLIC BLOOD PRESSURE: 70 MMHG | OXYGEN SATURATION: 98 %

## 2025-03-19 LAB
E COLI SXT1 STL QL IA: NEGATIVE
E COLI SXT2 STL QL IA: NEGATIVE
WBC #/AREA STL HPF: NORMAL /[HPF]

## 2025-03-20 LAB
BACTERIA STL CULT: NORMAL
O+P STL MICRO: NORMAL

## 2025-03-28 ENCOUNTER — PATIENT MESSAGE (OUTPATIENT)
Dept: INTERNAL MEDICINE | Facility: CLINIC | Age: 65
End: 2025-03-28
Payer: COMMERCIAL

## 2025-03-28 DIAGNOSIS — K52.9 CHRONIC DIARRHEA OF UNKNOWN ORIGIN: Primary | ICD-10-CM

## 2025-04-03 DIAGNOSIS — K52.9 CHRONIC DIARRHEA: ICD-10-CM

## 2025-04-04 ENCOUNTER — PATIENT MESSAGE (OUTPATIENT)
Dept: INTERNAL MEDICINE | Facility: CLINIC | Age: 65
End: 2025-04-04
Payer: COMMERCIAL

## 2025-04-04 DIAGNOSIS — K52.9 CHRONIC DIARRHEA OF UNKNOWN ORIGIN: Primary | ICD-10-CM

## 2025-05-02 ENCOUNTER — TELEPHONE (OUTPATIENT)
Dept: ENDOSCOPY | Facility: HOSPITAL | Age: 65
End: 2025-05-02

## 2025-05-02 ENCOUNTER — CLINICAL SUPPORT (OUTPATIENT)
Dept: ENDOSCOPY | Facility: HOSPITAL | Age: 65
End: 2025-05-02
Attending: EMERGENCY MEDICINE
Payer: COMMERCIAL

## 2025-05-02 VITALS — HEIGHT: 68 IN | BODY MASS INDEX: 17.13 KG/M2 | WEIGHT: 113 LBS

## 2025-05-02 DIAGNOSIS — K52.9 CHRONIC DIARRHEA OF UNKNOWN ORIGIN: ICD-10-CM

## 2025-05-02 DIAGNOSIS — Z12.11 ENCOUNTER FOR SCREENING COLONOSCOPY: Primary | ICD-10-CM

## 2025-05-02 NOTE — PLAN OF CARE
Referral for procedure from PAT appointment      Spoke to patient to schedule procedure(s) Colonoscopy       Physician to perform procedure(s) Dr. ABIOLA Cai  Date of Procedure (s) 05/06/2025  Arrival Time 1:45 Pm   Time of Procedure(s) 2:45 Pm    Location of Procedure(s) [facility]  Type of Rx Prep sent to patient: PEG  Instructions provided to patient via [facility]    Patient was informed on the following information and verbalized understanding. Screening questionnaire reviewed with patient and complete. If procedure requires anesthesia, a responsible adult needs to be present to accompany the patient home, patient cannot drive after receiving anesthesia. Appointment details are tentative, especially check-in time. Patient will receive a prep-op call 7 days prior to confirm check-in time for procedure. If applicable the patient should contact their pharmacy to verify Rx for procedure prep is ready for pick-up. Patient was advised to call the scheduling department at [phone] if pharmacy states no Rx is available. Patient was advised to call the endoscopy scheduling department if any questions or concerns arise.      [facility] Scheduling Department

## 2025-05-02 NOTE — TELEPHONE ENCOUNTER
Referral for procedure from PAT appointment      Spoke to patient to schedule procedure(s) Colonoscopy       Physician to perform procedure(s) Dr. ABIOLA Cai  Date of Procedure (s) 05/06/2025  Arrival Time 1:45 Pm   Time of Procedure(s) 2:45 Pm    Location of Procedure(s) [facility]  Type of Rx Prep sent to patient: PEG  Instructions provided to patient via [facility]    Patient was informed on the following information and verbalized understanding. Screening questionnaire reviewed with patient and complete. If procedure requires anesthesia, a responsible adult needs to be present to accompany the patient home, patient cannot drive after receiving anesthesia. Appointment details are tentative, especially check-in time. Patient will receive a prep-op call 7 days prior to confirm check-in time for procedure. If applicable the patient should contact their pharmacy to verify Rx for procedure prep is ready for pick-up. Patient was advised to call the scheduling department at [phone] if pharmacy states no Rx is available. Patient was advised to call the endoscopy scheduling department if any questions or concerns arise.      [facility] Scheduling Department

## 2025-05-03 ENCOUNTER — ANESTHESIA EVENT (OUTPATIENT)
Dept: ENDOSCOPY | Facility: HOSPITAL | Age: 65
End: 2025-05-03
Payer: COMMERCIAL

## 2025-05-03 NOTE — ANESTHESIA PREPROCEDURE EVALUATION
05/03/2025  Nuris Cm is a 64 y.o., female.    Past Medical History:   Diagnosis Date    Arthritis     Asthma     Breast cancer 2008    double mastecomy    Depression     Encounter for blood transfusion     Hepatitis 1998    autoimmune    Thyroid disease      Past Surgical History:   Procedure Laterality Date    BREAST RECONSTRUCTION  2010    COLONOSCOPY N/A 3/14/2023    Procedure: COLONOSCOPY;  Surgeon: John Pan MD;  Location: [address]);  Service: Endoscopy;  Laterality: N/A;    COSMETIC SURGERY      CYSTOSCOPY      ESOPHAGOGASTRODUODENOSCOPY N/A 04/19/2022    Procedure: ESOPHAGOGASTRODUODENOSCOPY (EGD);  Surgeon: Alexa Odell MD;  [facility]  Service: Endoscopy;  Laterality: N/A;  fully vaccinated, instructions sent to [facility]    ESOPHAGOGASTRODUODENOSCOPY N/A 3/14/2023    Procedure: EGD (ESOPHAGOGASTRODUODENOSCOPY);  Surgeon: John Pan MD;  Location: [address]);  Service: Endoscopy;  Laterality: N/A;  constipation protocol, instr portal - handed to patient -ml  Precall complete- [place]    HYSTERECTOMY  2010    masectomy  2008    ROTATOR CUFF REPAIR Right 11/2021    TONSILLECTOMY         Pre-op Assessment    I have reviewed the Patient Summary Reports.     I have reviewed the Nursing Notes. I have reviewed the NPO Status.   I have reviewed the Medications.     Review of Systems  Anesthesia Hx:    Pt woke up aware during a colonoscopy in the past              Social:  Non-Smoker, Social Alcohol Use       Hematology/Oncology:  Hematology Normal                       --  Cancer in past history:       Breast       surgery       EENT/Dental:  EENT/Dental Normal           Cardiovascular:  Cardiovascular Normal                                              Pulmonary:    Asthma       Asthma:               Renal/:  Renal/ Normal                  Hepatic/GI:      Liver Disease, Hepatitis           Liver Disease        Musculoskeletal:  Musculoskeletal Normal                Neurological:  Neurology Normal                                      Endocrine:   Hypothyroidism          Dermatological:  Skin Normal    Psych:    depression                Physical Exam  General: Well nourished    Airway:  Mallampati: II   Mouth Opening: Normal  TM Distance: Normal  Tongue: Normal  Neck ROM: Normal ROM    Dental:  Intact        Anesthesia Plan  Type of Anesthesia, risks & benefits discussed:    Anesthesia Type: Gen Natural Airway  Intra-op Monitoring Plan: Standard ASA Monitors  Induction:  IV  Informed Consent: Informed consent signed with the Patient and all parties understand the risks and agree with anesthesia plan.  All questions answered.   ASA Score: 2  Day of Surgery Review of History & Physical: H&P Update referred to the surgeon/provider.    Ready For Surgery From Anesthesia Perspective.     .

## 2025-05-05 ENCOUNTER — TELEPHONE (OUTPATIENT)
Dept: ENDOSCOPY | Facility: HOSPITAL | Age: 65
End: 2025-05-05
Payer: COMMERCIAL

## 2025-05-05 NOTE — TELEPHONE ENCOUNTER
Called patient to confirm endoscopy appointment on 5/6/2025. All questions answered. Patient has prep and instructions. Appointment confirmed.

## 2025-05-06 ENCOUNTER — HOSPITAL ENCOUNTER (OUTPATIENT)
Facility: HOSPITAL | Age: 65
Discharge: HOME | End: 2025-05-06
Attending: INTERNAL MEDICINE | Admitting: INTERNAL MEDICINE
Payer: COMMERCIAL

## 2025-05-06 ENCOUNTER — ANESTHESIA (OUTPATIENT)
Dept: ENDOSCOPY | Facility: HOSPITAL | Age: 65
End: 2025-05-06
Payer: COMMERCIAL

## 2025-05-06 VITALS
HEIGHT: 68 IN | OXYGEN SATURATION: 100 % | RESPIRATION RATE: 18 BRPM | HEART RATE: 78 BPM | BODY MASS INDEX: 17.13 KG/M2 | DIASTOLIC BLOOD PRESSURE: 61 MMHG | WEIGHT: 113 LBS | TEMPERATURE: 98 F | SYSTOLIC BLOOD PRESSURE: 125 MMHG

## 2025-05-06 DIAGNOSIS — K52.9 CHRONIC DIARRHEA OF UNKNOWN ORIGIN: ICD-10-CM

## 2025-05-06 DIAGNOSIS — K52.9 CHRONIC DIARRHEA: Primary | ICD-10-CM

## 2025-05-06 LAB
POCT GLUCOSE: 145 MG/DL (ref 70–110)
POCT GLUCOSE: 55 MG/DL (ref 70–110)
POCT GLUCOSE: 92 MG/DL (ref 70–110)

## 2025-05-06 PROCEDURE — 82962 GLUCOSE BLOOD TEST: CPT | Performed by: INTERNAL MEDICINE

## 2025-05-06 PROCEDURE — 88305 TISSUE EXAM BY PATHOLOGIST: CPT | Mod: TC | Performed by: INTERNAL MEDICINE

## 2025-05-06 PROCEDURE — 82962 GLUCOSE BLOOD TEST: CPT | Mod: 91 | Performed by: INTERNAL MEDICINE

## 2025-05-06 PROCEDURE — 99900035 HC TECH TIME PER 15 MIN (STAT)

## 2025-05-06 PROCEDURE — 25000003 PHARM REV CODE 250: Performed by: NURSE ANESTHETIST, CERTIFIED REGISTERED

## 2025-05-06 PROCEDURE — 63600175 PHARM REV CODE 636 W HCPCS: Performed by: NURSE ANESTHETIST, CERTIFIED REGISTERED

## 2025-05-06 PROCEDURE — 37000009 HC ANESTHESIA EA ADD 15 MINS: Performed by: INTERNAL MEDICINE

## 2025-05-06 PROCEDURE — 94761 N-INVAS EAR/PLS OXIMETRY MLT: CPT

## 2025-05-06 PROCEDURE — 45380 COLONOSCOPY AND BIOPSY: CPT | Mod: ,,, | Performed by: INTERNAL MEDICINE

## 2025-05-06 PROCEDURE — 27201012 HC FORCEPS, HOT/COLD, DISP: Performed by: INTERNAL MEDICINE

## 2025-05-06 PROCEDURE — 37000008 HC ANESTHESIA 1ST 15 MINUTES: Performed by: INTERNAL MEDICINE

## 2025-05-06 PROCEDURE — 25000003 PHARM REV CODE 250: Performed by: STUDENT IN AN ORGANIZED HEALTH CARE EDUCATION/TRAINING PROGRAM

## 2025-05-06 PROCEDURE — 45380 COLONOSCOPY AND BIOPSY: CPT | Performed by: INTERNAL MEDICINE

## 2025-05-06 RX ORDER — SERTRALINE HYDROCHLORIDE 100 MG/1
100 TABLET, FILM COATED ORAL
COMMUNITY
Start: 2025-04-05

## 2025-05-06 RX ORDER — LIDOCAINE HYDROCHLORIDE 20 MG/ML
INJECTION, SOLUTION EPIDURAL; INFILTRATION; INTRACAUDAL; PERINEURAL
Status: DISCONTINUED | OUTPATIENT
Start: 2025-05-06 | End: 2025-05-06

## 2025-05-06 RX ORDER — SODIUM CHLORIDE 9 MG/ML
INJECTION, SOLUTION INTRAVENOUS CONTINUOUS
Status: DISCONTINUED | OUTPATIENT
Start: 2025-05-06 | End: 2025-05-06 | Stop reason: HOSPADM

## 2025-05-06 RX ORDER — PROPOFOL 10 MG/ML
VIAL (ML) INTRAVENOUS CONTINUOUS PRN
Status: DISCONTINUED | OUTPATIENT
Start: 2025-05-06 | End: 2025-05-06

## 2025-05-06 RX ADMIN — LIDOCAINE HYDROCHLORIDE 20 MG: 20 INJECTION, SOLUTION EPIDURAL; INFILTRATION; INTRACAUDAL; PERINEURAL at 02:05

## 2025-05-06 RX ADMIN — SODIUM CHLORIDE: 0.9 INJECTION, SOLUTION INTRAVENOUS at 02:05

## 2025-05-06 RX ADMIN — DEXTROSE MONOHYDRATE 250 ML: 100 INJECTION, SOLUTION INTRAVENOUS at 01:05

## 2025-05-06 RX ADMIN — PROPOFOL 80 MG: 10 INJECTION, EMULSION INTRAVENOUS at 02:05

## 2025-05-06 RX ADMIN — PROPOFOL 125 MCG/KG/MIN: 10 INJECTION, EMULSION INTRAVENOUS at 02:05

## 2025-05-06 NOTE — TRANSFER OF CARE
"Anesthesia Transfer of Care Note    Patient: Nuris Cm    Procedure(s) Performed: Procedure(s) (LRB):  COLONOSCOPY (N/A)    Patient location: PACU    Anesthesia Type: general    Transport from OR: Transported from OR on room air with adequate spontaneous ventilation    Post pain: adequate analgesia    Post assessment: no apparent anesthetic complications    Post vital signs: stable    Level of consciousness: awake and lethargic    Nausea/Vomiting: no nausea/vomiting    Complications: none    Transfer of care protocol was followed      Last vitals: Visit Vitals  BP (!) 115/53 (Patient Position: Lying)   Pulse 92   Temp 37.4 °C (99.3 °F) (Temporal)   Resp 18   Ht 5' 8" (1.727 m)   Wt 51.3 kg (113 lb)   SpO2 99%   Breastfeeding No   BMI 17.18 kg/m²     "

## 2025-05-06 NOTE — PLAN OF CARE
Pt arrived to recovery dosc via stretcher per GI team. Bedside report received. Pt attached to bedside monitor. VSS. Pt _AAOx4__ post procedure. Pt on room air oxygen; sats _100___%. Pt IV access infusing NS. Will continue to monitor.

## 2025-05-06 NOTE — H&P
Short Stay Endoscopy History and Physical    PCP - Lorene Cummings MD    Procedure - Colonoscopy  Sedation: GA  ASA - per anesthesia  Mallampati - per anesthesia  History of Anesthesia problems - no  Family history Anesthesia problems -  no     HPI:  This is a 64 y.o. female here for evaluation of : Chronic diarrhea      Reflux - no  Dysphagia - no  Abdominal pain - no  Diarrhea - yes    ROS:  Constitutional: No fevers, chills, No weight loss  ENT: No allergies  CV: No chest pain  Pulm: No cough, No shortness of breath  Ophtho: No vision changes  GI: see HPI  Medical History:  has a past medical history of Arthritis, Asthma, Breast cancer (2008), Depression, Encounter for blood transfusion, Hepatitis (1998), and Thyroid disease.    Surgical History:  has a past surgical history that includes Hysterectomy (2010); masectomy (2008); Breast reconstruction (2010); Cosmetic surgery; Tonsillectomy; Esophagogastroduodenoscopy (N/A, 04/19/2022); Rotator cuff repair (Right, 11/2021); Cystoscopy; Esophagogastroduodenoscopy (N/A, 3/14/2023); and Colonoscopy (N/A, 3/14/2023).    Family History: family history includes Arthritis in her maternal aunt and mother; Cancer in her maternal grandmother and paternal grandmother; Heart failure in her father and paternal uncle.. Otherwise no colon cancer, inflammatory bowel disease, or GI malignancies.    Social History:  reports that she has never smoked. She has never used smokeless tobacco. She reports current alcohol use. She reports that she does not use drugs.    Review of patient's allergies indicates:   Allergen Reactions    Codeine Hives    Gluten Diarrhea    Latex Hives    Milk containing products (dairy) Diarrhea       Medications:   Prescriptions Prior to Admission[1]    Objective Findings:    Vital Signs: Per nursing notes.    Physical Exam:  General Appearance: Well appearing in no acute distress  Head:   Normocephalic, without obvious abnormality  Eyes:    No scleral  icterus  Airway: Open  Neck: No restriction in mobility  Lungs: CTA bilaterally in anterior and posterior fields, no wheezes, no crackles.  Heart:  Regular rate and rhythm, S1, S2 normal, no murmurs heard  Abdomen: Soft, non tender, non distended      Labs:  Lab Results   Component Value Date    WBC 6.49 03/13/2025    HGB 13.1 03/13/2025    HCT 39.3 03/13/2025     03/13/2025    CHOL 176 04/07/2022    TRIG 71 04/07/2022    HDL 56 04/07/2022    ALT 11 03/13/2025    AST 25 03/13/2025     03/13/2025    K 3.7 03/13/2025     03/13/2025    CREATININE 0.6 03/13/2025    BUN 13 03/13/2025    CO2 23 03/13/2025    TSH 0.874 03/13/2025    INR 0.9 03/21/2007    HGBA1C 4.8 03/13/2025         I have explained the risks and benefits of endoscopy procedures to the patient including but not limited to bleeding, perforation, infection, and death.    Thank you so much for allowing me to participate in the care of Nuris Cm    Andrew Cai MD         [1]   Medications Prior to Admission   Medication Sig Dispense Refill Last Dose/Taking    azelastine (ASTELIN) 137 mcg (0.1 %) nasal spray 2 sprays once daily.   Past Month    celecoxib (CELEBREX) 200 MG capsule Take 200 mg by mouth once daily.    5/5/2025    cycloSPORINE (RESTASIS) 0.05 % ophthalmic emulsion Place 1 drop into both eyes 2 (two) times daily.   Past Week    diphenoxylate-atropine 2.5-0.025 mg (LOMOTIL) 2.5-0.025 mg per tablet Take 1 tablet by mouth 3 (three) times daily as needed for Diarrhea. 40 tablet 3 Past Week    liothyronine (CYTOMEL) 5 MCG Tab Take 5 mcg by mouth every morning.   5/5/2025    ondansetron (ZOFRAN) 4 MG tablet Take 1 tablet (4 mg total) by mouth every 6 (six) hours. 20 tablet 0 Past Month    sertraline (ZOLOFT) 100 MG tablet Take 100 mg by mouth.   Past Week    tizanidine (ZANAFLEX) 4 MG tablet Take 4 mg by mouth nightly as needed.    Past Month    traZODone (DESYREL) 100 MG tablet Take 100 mg by mouth nightly.   Past Week     valACYclovir (VALTREX) 500 MG tablet Take 1 tablet (500 mg total) by mouth once daily. 90 tablet 3 Past Week    denosumab (PROLIA) 60 mg/mL Syrg Inject 60 mg into the skin every 6 (six) months.       fluoruracil (CARAC) 0.5 % cream Apply topically. (Patient not taking: Reported on 7/27/2024)       levothyroxine (SYNTHROID) 75 MCG tablet Take 1 tablet (75 mcg total) by mouth before breakfast. 90 tablet 3     meloxicam (MOBIC) 15 MG tablet Take 1 tablet by mouth once daily.  3

## 2025-05-06 NOTE — PLAN OF CARE
Pt in preop bay 1, VSS and IV inserted. Pt denies any open wounds on body or the use of any weight loss injections. Pt needs an updated H&P, procedural consents, an admit order and anesthesia consents, otherwise ready to roll.

## 2025-05-06 NOTE — PLAN OF CARE
Discharge instructions given to patient with verbalization of understanding all.  VSS, denies n/v and tolerating PO, rates pain level tolerable. IV removed, and Family notified for Patient discharge home.

## 2025-05-06 NOTE — PROVATION PATIENT INSTRUCTIONS
Discharge Summary/Instructions after an Endoscopic Procedure  Patient Name: Nuris Cm  Patient MRN: [id number]  Patient YOB: 1960  Tuesday, May 6, 2025  Andrew Cai MD  Dear patient,  As a result of recent federal legislation (The Federal Cures Act), you may   receive lab or pathology results from your procedure in your [facility]   account before your physician is able to contact you. Your physician or   their representative will relay the results to you with their   recommendations at their soonest availability.  Thank you,  RESTRICTIONS:  During your procedure today, you received medications for sedation.  These   medications may affect your judgment, balance and coordination.  Therefore,   for 24 hours, you have the following restrictions:   - DO NOT drive a car, operate machinery, make legal/financial decisions,   sign important papers or drink alcohol.    ACTIVITY:  Today: no heavy lifting, straining or running due to procedural   sedation/anesthesia.  The following day: return to full activity including work.  DIET:  Eat and drink normally unless instructed otherwise.     TREATMENT FOR COMMON SIDE EFFECTS:  - Mild abdominal pain, nausea, belching, bloating or excessive gas:  rest,   eat lightly and use a heating pad.  - Sore Throat: treat with throat lozenges and/or gargle with warm salt   water.  - Because air was used during the procedure, expelling large amounts of air   from your rectum or belching is normal.  - If a bowel prep was taken, you may not have a bowel movement for 1-3 days.    This is normal.  SYMPTOMS TO WATCH FOR AND REPORT TO YOUR PHYSICIAN:  1. Abdominal pain or bloating, other than gas cramps.  2. Chest pain.  3. Back pain.  4. Signs of infection such as: chills or fever occurring within 24 hours   after the procedure.  5. Rectal bleeding, which would show as bright red, maroon, or black stools.   (A tablespoon of blood from the rectum is not serious, especially if    hemorrhoids are present.)  6. Vomiting.  7. Weakness or dizziness.  GO DIRECTLY TO THE NEAREST EMERGENCY ROOM IF YOU HAVE ANY OF THE FOLLOWING:      Difficulty breathing              Chills and/or fever over 101 F   Persistent vomiting and/or vomiting blood   Severe abdominal pain   Severe chest pain   Black, tarry stools   Bleeding- more than one tablespoon   Any other symptom or condition that you feel may need urgent attention  Your doctor recommends these additional instructions:  If any biopsies were taken, your doctors clinic will contact you in 1 to 2   weeks with any results.  - Patient has a contact number available for emergencies.  The signs and   symptoms of potential delayed complications were discussed with the   patient.  Return to normal activities tomorrow.  Written discharge   instructions were provided to the patient.   - Discharge patient to home.   - Resume previous diet.   - Continue present medications.   - Await pathology results.   - Repeat colonoscopy in 10 years for screening purposes.   For questions, problems or results please call your physician - Andrew Cai MD at Work:  [phone].  [facility] [place], EMERGENCY ROOM PHONE NUMBER: [phone]  IF A COMPLICATION OR EMERGENCY SITUATION ARISES AND YOU ARE UNABLE TO REACH   YOUR PHYSICIAN - GO DIRECTLY TO THE EMERGENCY ROOM.  Andrew Cai MD  5/6/2025 2:49:35 PM  This report has been verified and signed electronically.  Dear patient,  As a result of recent federal legislation (The Federal Cures Act), you may   receive lab or pathology results from your procedure in your [facility]   account before your physician is able to contact you. Your physician or   their representative will relay the results to you with their   recommendations at their soonest availability.  Thank you,  [employer]ATION

## 2025-05-08 ENCOUNTER — TELEPHONE (OUTPATIENT)
Dept: GASTROENTEROLOGY | Facility: CLINIC | Age: 65
End: 2025-05-08
Payer: COMMERCIAL

## 2025-05-08 LAB
ESTROGEN SERPL-MCNC: NORMAL PG/ML
INSULIN SERPL-ACNC: NORMAL U[IU]/ML
LAB AP CLINICAL INFORMATION: NORMAL
LAB AP GROSS DESCRIPTION: NORMAL
LAB AP PERFORMING LOCATION(S): NORMAL
LAB AP REPORT FOOTNOTES: NORMAL

## 2025-05-08 NOTE — TELEPHONE ENCOUNTER
----- Message from Andrew Cai MD sent at 5/8/2025  9:43 AM CDT -----  Please notify patient, the biopsies reveal microscopic colitis. Please schedule a visit with  for treatment.  ----- Message -----  From: Lab, Background User  Sent: 5/8/2025   9:07 AM CDT  To: Andrew Cai MD

## 2025-05-08 NOTE — ANESTHESIA POSTPROCEDURE EVALUATION
Anesthesia Post Evaluation    Patient: Nuris Cm    Procedure(s) Performed: Procedure(s) (LRB):  COLONOSCOPY (N/A)    Final Anesthesia Type: general      Patient location during evaluation: PACU  Patient participation: Yes- Able to Participate  Level of consciousness: awake  Post-procedure vital signs: reviewed and stable  Pain management: adequate  Airway patency: patent    PONV status at discharge: No PONV  Anesthetic complications: no      Cardiovascular status: blood pressure returned to baseline  Respiratory status: unassisted  Hydration status: euvolemic  Follow-up not needed.              Vitals Value Taken Time   /61 05/06/25 15:09   Temp 36.4 °C (97.5 °F) 05/06/25 14:50   Pulse 78 05/06/25 15:09   Resp 22 05/06/25 15:09   SpO2 99 % 05/06/25 15:09   Vitals shown include unfiled device data.      Event Time   Out of Recovery 15:05:53         Pain/Dixie Score: No data recorded

## 2025-05-08 NOTE — TELEPHONE ENCOUNTER
Nuris Cm  Female, 64 y.o., 1960  MRN: [id number]    Patient that needs to be treated for microscopic colitis.

## 2025-05-09 RX ORDER — BUDESONIDE 3 MG/1
9 CAPSULE, COATED PELLETS ORAL DAILY
Qty: 90 CAPSULE | Refills: 0 | Status: SHIPPED | OUTPATIENT
Start: 2025-05-09 | End: 2025-06-08